# Patient Record
Sex: FEMALE | Race: BLACK OR AFRICAN AMERICAN | NOT HISPANIC OR LATINO
[De-identification: names, ages, dates, MRNs, and addresses within clinical notes are randomized per-mention and may not be internally consistent; named-entity substitution may affect disease eponyms.]

---

## 2017-11-21 PROBLEM — Z00.00 ENCOUNTER FOR PREVENTIVE HEALTH EXAMINATION: Status: ACTIVE | Noted: 2017-11-21

## 2017-12-07 ENCOUNTER — APPOINTMENT (OUTPATIENT)
Dept: PULMONOLOGY | Facility: CLINIC | Age: 67
End: 2017-12-07

## 2017-12-11 ENCOUNTER — APPOINTMENT (OUTPATIENT)
Dept: PULMONOLOGY | Facility: CLINIC | Age: 67
End: 2017-12-11

## 2019-04-01 ENCOUNTER — APPOINTMENT (OUTPATIENT)
Dept: PULMONOLOGY | Facility: CLINIC | Age: 69
End: 2019-04-01

## 2019-04-15 ENCOUNTER — APPOINTMENT (OUTPATIENT)
Dept: PULMONOLOGY | Facility: CLINIC | Age: 69
End: 2019-04-15
Payer: MEDICARE

## 2019-04-15 VITALS
TEMPERATURE: 98.3 F | HEART RATE: 94 BPM | RESPIRATION RATE: 12 BRPM | HEIGHT: 69 IN | OXYGEN SATURATION: 100 % | SYSTOLIC BLOOD PRESSURE: 106 MMHG | WEIGHT: 176 LBS | BODY MASS INDEX: 26.07 KG/M2 | DIASTOLIC BLOOD PRESSURE: 80 MMHG

## 2019-04-15 DIAGNOSIS — Z87.891 PERSONAL HISTORY OF NICOTINE DEPENDENCE: ICD-10-CM

## 2019-04-15 DIAGNOSIS — Z86.79 PERSONAL HISTORY OF OTHER DISEASES OF THE CIRCULATORY SYSTEM: ICD-10-CM

## 2019-04-15 DIAGNOSIS — J44.9 CHRONIC OBSTRUCTIVE PULMONARY DISEASE, UNSPECIFIED: ICD-10-CM

## 2019-04-15 PROCEDURE — 94060 EVALUATION OF WHEEZING: CPT

## 2019-04-15 PROCEDURE — 99204 OFFICE O/P NEW MOD 45 MIN: CPT | Mod: 25

## 2019-04-15 PROCEDURE — 94727 GAS DIL/WSHOT DETER LNG VOL: CPT

## 2019-04-15 PROCEDURE — 94729 DIFFUSING CAPACITY: CPT

## 2019-04-15 RX ORDER — ALBUTEROL SULFATE 90 UG/1
108 (90 BASE) INHALANT RESPIRATORY (INHALATION)
Refills: 0 | Status: ACTIVE | COMMUNITY

## 2019-04-15 RX ORDER — AMLODIPINE BESYLATE 10 MG/1
10 TABLET ORAL
Refills: 0 | Status: ACTIVE | COMMUNITY

## 2019-04-15 NOTE — PHYSICAL EXAM
[Normal Appearance] : normal appearance [Erythema] : no erythema of the pharynx [Neck Cervical Mass (___cm)] : no neck mass was observed [Auscultation Breath Sounds / Voice Sounds] : lungs were clear to auscultation bilaterally [Heart Sounds] : normal S1 and S2 [Abdomen Tenderness] : non-tender [] : no hepato-splenomegaly [Abnormal Walk] : normal gait [Nail Clubbing] : no clubbing of the fingernails [Skin Turgor] : normal skin turgor [No Focal Deficits] : no focal deficits [Oriented To Time, Place, And Person] : oriented to person, place, and time

## 2019-04-15 NOTE — PROCEDURE
[FreeTextEntry1] : Pulmonary function testing April 15, 2019. Mild obstructive airways disease was noted. Lung volumes were normal. Diffusion was mildly reduced. Significant improvement noted after administration of bronchodilator.

## 2019-04-15 NOTE — HISTORY OF PRESENT ILLNESS
[FreeTextEntry1] : 68-year-old former smoker advised to have pulmonary consultation for her breathing. She stopped smoking in 2018. She smoked 2-3 cigarettes per day for 50 years. Denied any coughing or wheezing. Becomes short of breath when walking up steps or 2-3 city blocks. No exertional chest pain, pressure or palpitations. Symptomatically she recovers with rest.

## 2019-04-15 NOTE — DISCUSSION/SUMMARY
[FreeTextEntry1] : She is a 68-year-old, former smoker, with PMH of hypertension and mild COPD.\par \par Her COPD is stable. She was advised to continue with albuterol on an as-needed basis.\par \par I would like to see her again in about 6 months. Sooner if necessary.

## 2019-04-15 NOTE — REVIEW OF SYSTEMS
[Fever] : no fever [Cough] : no cough [Dyspnea] : dyspnea [Wheezing] : no wheezing [PND] : no PND [Palpitations] : no palpitations [Nasal Discharge] : no nasal discharge [Heartburn] : no heartburn [Dysuria] : no dysuria [Myalgias] : no myalgias [Rash] : no [unfilled] rash [Anemia] : no anemia [Diabetes] : no diabetes mellitus [Headache] : no headache [Difficulty Initiating Sleep] : no difficulty falling asleep

## 2019-05-23 ENCOUNTER — APPOINTMENT (OUTPATIENT)
Dept: PULMONOLOGY | Facility: CLINIC | Age: 69
End: 2019-05-23

## 2019-08-05 ENCOUNTER — INPATIENT (INPATIENT)
Facility: HOSPITAL | Age: 69
LOS: 2 days | Discharge: ANOTHER HOSPITAL | End: 2019-08-08
Attending: HOSPITALIST | Admitting: HOSPITALIST
Payer: COMMERCIAL

## 2019-08-05 VITALS
DIASTOLIC BLOOD PRESSURE: 88 MMHG | SYSTOLIC BLOOD PRESSURE: 127 MMHG | HEIGHT: 71 IN | RESPIRATION RATE: 16 BRPM | TEMPERATURE: 98 F | WEIGHT: 138.89 LBS | OXYGEN SATURATION: 99 % | HEART RATE: 86 BPM

## 2019-08-05 DIAGNOSIS — K76.89 OTHER SPECIFIED DISEASES OF LIVER: ICD-10-CM

## 2019-08-05 DIAGNOSIS — I10 ESSENTIAL (PRIMARY) HYPERTENSION: ICD-10-CM

## 2019-08-05 LAB
ALBUMIN SERPL ELPH-MCNC: 2.7 G/DL — LOW (ref 3.3–5)
ALP SERPL-CCNC: 819 U/L — HIGH (ref 40–120)
ALT FLD-CCNC: 154 U/L — HIGH (ref 12–78)
AMMONIA BLD-MCNC: <10 UMOL/L — LOW (ref 11–32)
ANION GAP SERPL CALC-SCNC: 9 MMOL/L — SIGNIFICANT CHANGE UP (ref 5–17)
APTT BLD: 30.2 SEC — SIGNIFICANT CHANGE UP (ref 27.5–36.3)
AST SERPL-CCNC: 123 U/L — HIGH (ref 15–37)
BASOPHILS # BLD AUTO: 0.02 K/UL — SIGNIFICANT CHANGE UP (ref 0–0.2)
BASOPHILS NFR BLD AUTO: 0.4 % — SIGNIFICANT CHANGE UP (ref 0–2)
BILIRUB DIRECT SERPL-MCNC: 3.78 MG/DL — HIGH (ref 0.05–0.2)
BILIRUB INDIRECT FLD-MCNC: 0.8 MG/DL — SIGNIFICANT CHANGE UP (ref 0.2–1)
BILIRUB SERPL-MCNC: 4.6 MG/DL — HIGH (ref 0.2–1.2)
BILIRUB SERPL-MCNC: 4.6 MG/DL — HIGH (ref 0.2–1.2)
BUN SERPL-MCNC: 5 MG/DL — LOW (ref 7–23)
CALCIUM SERPL-MCNC: 8.7 MG/DL — SIGNIFICANT CHANGE UP (ref 8.5–10.1)
CHLORIDE SERPL-SCNC: 103 MMOL/L — SIGNIFICANT CHANGE UP (ref 96–108)
CO2 SERPL-SCNC: 27 MMOL/L — SIGNIFICANT CHANGE UP (ref 22–31)
CREAT SERPL-MCNC: 0.63 MG/DL — SIGNIFICANT CHANGE UP (ref 0.5–1.3)
EOSINOPHIL # BLD AUTO: 0.03 K/UL — SIGNIFICANT CHANGE UP (ref 0–0.5)
EOSINOPHIL NFR BLD AUTO: 0.6 % — SIGNIFICANT CHANGE UP (ref 0–6)
GLUCOSE SERPL-MCNC: 105 MG/DL — HIGH (ref 70–99)
HCT VFR BLD CALC: 34.5 % — SIGNIFICANT CHANGE UP (ref 34.5–45)
HGB BLD-MCNC: 11.3 G/DL — LOW (ref 11.5–15.5)
IMM GRANULOCYTES NFR BLD AUTO: 0.2 % — SIGNIFICANT CHANGE UP (ref 0–1.5)
INR BLD: 1.09 RATIO — SIGNIFICANT CHANGE UP (ref 0.88–1.16)
LYMPHOCYTES # BLD AUTO: 1.7 K/UL — SIGNIFICANT CHANGE UP (ref 1–3.3)
LYMPHOCYTES # BLD AUTO: 32.7 % — SIGNIFICANT CHANGE UP (ref 13–44)
MAGNESIUM SERPL-MCNC: 1.9 MG/DL — SIGNIFICANT CHANGE UP (ref 1.6–2.6)
MCHC RBC-ENTMCNC: 27.4 PG — SIGNIFICANT CHANGE UP (ref 27–34)
MCHC RBC-ENTMCNC: 32.8 GM/DL — SIGNIFICANT CHANGE UP (ref 32–36)
MCV RBC AUTO: 83.5 FL — SIGNIFICANT CHANGE UP (ref 80–100)
MONOCYTES # BLD AUTO: 0.37 K/UL — SIGNIFICANT CHANGE UP (ref 0–0.9)
MONOCYTES NFR BLD AUTO: 7.1 % — SIGNIFICANT CHANGE UP (ref 2–14)
NEUTROPHILS # BLD AUTO: 3.07 K/UL — SIGNIFICANT CHANGE UP (ref 1.8–7.4)
NEUTROPHILS NFR BLD AUTO: 59 % — SIGNIFICANT CHANGE UP (ref 43–77)
NRBC # BLD: 0 /100 WBCS — SIGNIFICANT CHANGE UP (ref 0–0)
PLATELET # BLD AUTO: 291 K/UL — SIGNIFICANT CHANGE UP (ref 150–400)
POTASSIUM SERPL-MCNC: 3.5 MMOL/L — SIGNIFICANT CHANGE UP (ref 3.5–5.3)
POTASSIUM SERPL-SCNC: 3.5 MMOL/L — SIGNIFICANT CHANGE UP (ref 3.5–5.3)
PROT SERPL-MCNC: 6.5 GM/DL — SIGNIFICANT CHANGE UP (ref 6–8.3)
PROTHROM AB SERPL-ACNC: 12.2 SEC — SIGNIFICANT CHANGE UP (ref 10–12.9)
RBC # BLD: 4.13 M/UL — SIGNIFICANT CHANGE UP (ref 3.8–5.2)
RBC # FLD: 16.6 % — HIGH (ref 10.3–14.5)
SODIUM SERPL-SCNC: 139 MMOL/L — SIGNIFICANT CHANGE UP (ref 135–145)
WBC # BLD: 5.2 K/UL — SIGNIFICANT CHANGE UP (ref 3.8–10.5)
WBC # FLD AUTO: 5.2 K/UL — SIGNIFICANT CHANGE UP (ref 3.8–10.5)

## 2019-08-05 PROCEDURE — 71045 X-RAY EXAM CHEST 1 VIEW: CPT | Mod: 26

## 2019-08-05 PROCEDURE — 99223 1ST HOSP IP/OBS HIGH 75: CPT

## 2019-08-05 PROCEDURE — 99285 EMERGENCY DEPT VISIT HI MDM: CPT

## 2019-08-05 PROCEDURE — 76700 US EXAM ABDOM COMPLETE: CPT | Mod: 26

## 2019-08-05 RX ORDER — AMLODIPINE BESYLATE 2.5 MG/1
10 TABLET ORAL DAILY
Refills: 0 | Status: DISCONTINUED | OUTPATIENT
Start: 2019-08-05 | End: 2019-08-08

## 2019-08-05 RX ORDER — ALBUTEROL 90 UG/1
1 AEROSOL, METERED ORAL
Refills: 0 | Status: DISCONTINUED | OUTPATIENT
Start: 2019-08-05 | End: 2019-08-08

## 2019-08-05 RX ORDER — CILOSTAZOL 100 MG/1
50 TABLET ORAL
Refills: 0 | Status: DISCONTINUED | OUTPATIENT
Start: 2019-08-05 | End: 2019-08-08

## 2019-08-05 RX ORDER — MORPHINE SULFATE 50 MG/1
4 CAPSULE, EXTENDED RELEASE ORAL EVERY 6 HOURS
Refills: 0 | Status: DISCONTINUED | OUTPATIENT
Start: 2019-08-05 | End: 2019-08-08

## 2019-08-05 RX ORDER — ONDANSETRON 8 MG/1
4 TABLET, FILM COATED ORAL ONCE
Refills: 0 | Status: COMPLETED | OUTPATIENT
Start: 2019-08-05 | End: 2019-08-05

## 2019-08-05 RX ADMIN — MORPHINE SULFATE 4 MILLIGRAM(S): 50 CAPSULE, EXTENDED RELEASE ORAL at 18:44

## 2019-08-05 RX ADMIN — MORPHINE SULFATE 4 MILLIGRAM(S): 50 CAPSULE, EXTENDED RELEASE ORAL at 23:17

## 2019-08-05 RX ADMIN — MORPHINE SULFATE 4 MILLIGRAM(S): 50 CAPSULE, EXTENDED RELEASE ORAL at 23:32

## 2019-08-05 RX ADMIN — ONDANSETRON 4 MILLIGRAM(S): 8 TABLET, FILM COATED ORAL at 18:44

## 2019-08-05 NOTE — PATIENT PROFILE ADULT - VISION (WITH CORRECTIVE LENSES IF THE PATIENT USUALLY WEARS THEM):
Rx glasses/Partially impaired: cannot see medication labels or newsprint, but can see obstacles in path, and the surrounding layout; can count fingers at arm's length

## 2019-08-05 NOTE — ED PROVIDER NOTE - CLINICAL SUMMARY MEDICAL DECISION MAKING FREE TEXT BOX
pt pw loose stools and diarrhea. at risk for malignancy pt pw loose stools and diarrhea. at risk for malignancy. sono shows obstructive choledocolithiasis. will admit. case cuauhtemoc conley

## 2019-08-05 NOTE — CONSULT NOTE ADULT - SUBJECTIVE AND OBJECTIVE BOX
full consult dictated    Plan: Pt with elevated LFT's; dilated CBD; RUQ discomfort.  Will order MRCP for further evaluation. Check Hep C. Hold off on antibx for now.  Pending results of MRCP, might need ERCP.

## 2019-08-05 NOTE — H&P ADULT - NSHPPHYSICALEXAM_GEN_ALL_CORE
ICU Vital Signs Last 24 Hrs  T(C): 36.7 (05 Aug 2019 15:41), Max: 36.7 (05 Aug 2019 15:41)  T(F): 98 (05 Aug 2019 15:41), Max: 98 (05 Aug 2019 15:41)  HR: 86 (05 Aug 2019 15:41) (86 - 86)  BP: 127/88 (05 Aug 2019 15:41) (127/88 - 127/88)  BP(mean): --  ABP: --  ABP(mean): --  RR: 16 (05 Aug 2019 15:41) (16 - 16)  SpO2: 99% (05 Aug 2019 15:41) (99% - 99%)  GENERAL: NAD well-developed  HEAD:  Atraumatic, Normocephalic  EYES: EOMI, PERRLA, conjunctiva and sclera clear  ENMT: No tonsillar erythema, exudates, or enlargement; Moist mucous membranes, Good dentition, No lesions  NECK: Supple, No JVD, Normal thyroid  NERVOUS SYSTEM:  Alert & Oriented X3, Good concentration; Motor Strength 5/5 B/L upper and lower extremities; DTRs 2+ intact and symmetric  CHEST/LUNG: Clear to percussion bilaterally; No rales, rhonchi, wheezing, or rubs  HEART: Regular rate and rhythm; No murmurs, rubs, or gallops  ABDOMEN: Soft, Nontender, Nondistended; Bowel sounds present  EXTREMITIES:  2+ Peripheral Pulses, No clubbing, cyanosis, or edema  LYMPH: No lymphadenopathy   SKIN: No rashes or lesions ICU Vital Signs Last 24 Hrs  T(C): 36.7 (05 Aug 2019 15:41), Max: 36.7 (05 Aug 2019 15:41)  T(F): 98 (05 Aug 2019 15:41), Max: 98 (05 Aug 2019 15:41)  HR: 86 (05 Aug 2019 15:41) (86 - 86)  BP: 127/88 (05 Aug 2019 15:41) (127/88 - 127/88)  BP(mean): --  ABP: --  ABP(mean): --  RR: 16 (05 Aug 2019 15:41) (16 - 16)  SpO2: 99% (05 Aug 2019 15:41) (99% - 99%)  GENERAL: NAD well-developed  HEAD:  Atraumatic, Normocephalic  EYES: EOMI, PERRLA, conjunctiva and sclera clear  ENMT: No tonsillar erythema, exudates, or enlargement; Moist mucous membranes, Good dentition, No lesions  NECK: Supple, No JVD, Normal thyroid  NERVOUS SYSTEM:  Alert & Oriented X3, Good concentration; Motor Strength 5/5 B/L upper and lower extremities; DTRs 2+ intact and symmetric  CHEST/LUNG: Clear to percussion bilaterally; No rales, rhonchi, wheezing, or rubs  HEART: Regular rate and rhythm; No murmurs, rubs, or gallops  ABDOMEN: Soft, mild ruq tender, Nondistended; Bowel sounds present  EXTREMITIES:  2+ Peripheral Pulses, No clubbing, cyanosis, or edema  LYMPH: No lymphadenopathy   SKIN: No rashes or lesions

## 2019-08-05 NOTE — H&P ADULT - ASSESSMENT
68f with 2 weeks of diarrhea and now with pain to the ruq with multiple stones rto the ruq       IMPROVE VTE Individual Risk Assessment    RISK                                                          Points  [] Previous VTE                                           3  [] Thrombophilia                                        2  [] Lower limb paralysis                              2   [] Current Cancer                                       2   [] Immobilization > 24 hrs                        1  [] ICU/CCU stay > 24 hours                       1  [] Age > 60                                                   1    IMPROVE VTE Score:

## 2019-08-05 NOTE — ED PROVIDER NOTE - OBJECTIVE STATEMENT
Pertinent PMH/PSH/FHx/SHx and Review of Systems contained within:  68F former smoker, htn, pw 2 months of diarrhea and abnl lfts per pmd. patient notes the stool has been lightening. she goes 5 times daily, always loose. she denies abd pain, fever, chills, numbness, rash, bleeding, dysuria, ha, vision loss, rhinorrhea. nothing was given for symptoms  Fh and Sh not otherwise contributory  ROS otherwise negative

## 2019-08-05 NOTE — H&P ADULT - HISTORY OF PRESENT ILLNESS
68F former smoker, htn, pw 2 months of diarrhea and abnl lfts per pmd. patient notes the stool has been lightening. she goes 5 times daily, always loose. she denies abd pain, fever, chills, numbness, rash, bleeding, dysuria, ha, vision loss, rhinorrhea. nothing was given for symptoms 68F former smoker, htn, pw 2 months of diarrhea and abnl lfts per pmd. patient notes the stool has been lightening. she goes 5 times daily, always loose. she has ruq  abd pain, fever, chills, numbness, rash, bleeding, dysuria, ha, vision loss, rhinorrhea. nothing was given for symptoms

## 2019-08-06 DIAGNOSIS — N39.0 URINARY TRACT INFECTION, SITE NOT SPECIFIED: ICD-10-CM

## 2019-08-06 LAB
ALBUMIN SERPL ELPH-MCNC: 2.4 G/DL — LOW (ref 3.3–5)
ALP SERPL-CCNC: 754 U/L — HIGH (ref 40–120)
ALT FLD-CCNC: 134 U/L — HIGH (ref 12–78)
APPEARANCE UR: ABNORMAL
AST SERPL-CCNC: 104 U/L — HIGH (ref 15–37)
BACTERIA # UR AUTO: ABNORMAL
BILIRUB DIRECT SERPL-MCNC: 3.64 MG/DL — HIGH (ref 0.05–0.2)
BILIRUB INDIRECT FLD-MCNC: 0.9 MG/DL — SIGNIFICANT CHANGE UP (ref 0.2–1)
BILIRUB SERPL-MCNC: 4.5 MG/DL — HIGH (ref 0.2–1.2)
BILIRUB UR-MCNC: ABNORMAL
CANCER AG19-9 SERPL-ACNC: 53 U/ML — HIGH
COLOR SPEC: ABNORMAL
DIFF PNL FLD: ABNORMAL
EPI CELLS # UR: ABNORMAL
GLUCOSE UR QL: NEGATIVE MG/DL — SIGNIFICANT CHANGE UP
HCT VFR BLD CALC: 30 % — LOW (ref 34.5–45)
HCV AB S/CO SERPL IA: 0.06 S/CO — SIGNIFICANT CHANGE UP (ref 0–0.99)
HCV AB SERPL-IMP: SIGNIFICANT CHANGE UP
HGB BLD-MCNC: 9.7 G/DL — LOW (ref 11.5–15.5)
KETONES UR-MCNC: ABNORMAL
LEUKOCYTE ESTERASE UR-ACNC: ABNORMAL
MCHC RBC-ENTMCNC: 27.2 PG — SIGNIFICANT CHANGE UP (ref 27–34)
MCHC RBC-ENTMCNC: 32.3 GM/DL — SIGNIFICANT CHANGE UP (ref 32–36)
MCV RBC AUTO: 84.3 FL — SIGNIFICANT CHANGE UP (ref 80–100)
NITRITE UR-MCNC: POSITIVE
NRBC # BLD: 0 /100 WBCS — SIGNIFICANT CHANGE UP (ref 0–0)
PH UR: 6.5 — SIGNIFICANT CHANGE UP (ref 5–8)
PLATELET # BLD AUTO: 275 K/UL — SIGNIFICANT CHANGE UP (ref 150–400)
PROT SERPL-MCNC: 6.1 GM/DL — SIGNIFICANT CHANGE UP (ref 6–8.3)
PROT UR-MCNC: 100 MG/DL
RBC # BLD: 3.56 M/UL — LOW (ref 3.8–5.2)
RBC # FLD: 17.1 % — HIGH (ref 10.3–14.5)
RBC CASTS # UR COMP ASSIST: ABNORMAL /HPF (ref 0–4)
SP GR SPEC: 1.02 — SIGNIFICANT CHANGE UP (ref 1.01–1.02)
UROBILINOGEN FLD QL: 8 MG/DL
WBC # BLD: 5.4 K/UL — SIGNIFICANT CHANGE UP (ref 3.8–10.5)
WBC # FLD AUTO: 5.4 K/UL — SIGNIFICANT CHANGE UP (ref 3.8–10.5)
WBC UR QL: >50

## 2019-08-06 PROCEDURE — 93010 ELECTROCARDIOGRAM REPORT: CPT

## 2019-08-06 PROCEDURE — 74181 MRI ABDOMEN W/O CONTRAST: CPT | Mod: 26

## 2019-08-06 PROCEDURE — 99233 SBSQ HOSP IP/OBS HIGH 50: CPT

## 2019-08-06 RX ORDER — CEFTRIAXONE 500 MG/1
1000 INJECTION, POWDER, FOR SOLUTION INTRAMUSCULAR; INTRAVENOUS EVERY 24 HOURS
Refills: 0 | Status: DISCONTINUED | OUTPATIENT
Start: 2019-08-06 | End: 2019-08-08

## 2019-08-06 RX ORDER — METOCLOPRAMIDE HCL 10 MG
5 TABLET ORAL THREE TIMES A DAY
Refills: 0 | Status: COMPLETED | OUTPATIENT
Start: 2019-08-06 | End: 2019-08-08

## 2019-08-06 RX ORDER — ONDANSETRON 8 MG/1
4 TABLET, FILM COATED ORAL EVERY 6 HOURS
Refills: 0 | Status: DISCONTINUED | OUTPATIENT
Start: 2019-08-06 | End: 2019-08-08

## 2019-08-06 RX ORDER — SODIUM CHLORIDE 9 MG/ML
1000 INJECTION INTRAMUSCULAR; INTRAVENOUS; SUBCUTANEOUS
Refills: 0 | Status: COMPLETED | OUTPATIENT
Start: 2019-08-06 | End: 2019-08-07

## 2019-08-06 RX ADMIN — Medication 5 MILLIGRAM(S): at 13:57

## 2019-08-06 RX ADMIN — AMLODIPINE BESYLATE 10 MILLIGRAM(S): 2.5 TABLET ORAL at 05:16

## 2019-08-06 RX ADMIN — ALBUTEROL 1 PUFF(S): 90 AEROSOL, METERED ORAL at 03:46

## 2019-08-06 RX ADMIN — CILOSTAZOL 50 MILLIGRAM(S): 100 TABLET ORAL at 05:16

## 2019-08-06 RX ADMIN — ONDANSETRON 4 MILLIGRAM(S): 8 TABLET, FILM COATED ORAL at 05:15

## 2019-08-06 RX ADMIN — SODIUM CHLORIDE 75 MILLILITER(S): 9 INJECTION INTRAMUSCULAR; INTRAVENOUS; SUBCUTANEOUS at 12:57

## 2019-08-06 RX ADMIN — MORPHINE SULFATE 4 MILLIGRAM(S): 50 CAPSULE, EXTENDED RELEASE ORAL at 12:44

## 2019-08-06 RX ADMIN — Medication 5 MILLIGRAM(S): at 21:27

## 2019-08-06 RX ADMIN — CEFTRIAXONE 100 MILLIGRAM(S): 500 INJECTION, POWDER, FOR SOLUTION INTRAMUSCULAR; INTRAVENOUS at 19:20

## 2019-08-06 RX ADMIN — CILOSTAZOL 50 MILLIGRAM(S): 100 TABLET ORAL at 19:23

## 2019-08-07 DIAGNOSIS — Z29.9 ENCOUNTER FOR PROPHYLACTIC MEASURES, UNSPECIFIED: ICD-10-CM

## 2019-08-07 DIAGNOSIS — E87.6 HYPOKALEMIA: ICD-10-CM

## 2019-08-07 LAB
ALBUMIN SERPL ELPH-MCNC: 2.4 G/DL — LOW (ref 3.3–5)
ALP SERPL-CCNC: 725 U/L — HIGH (ref 40–120)
ALT FLD-CCNC: 123 U/L — HIGH (ref 12–78)
ANION GAP SERPL CALC-SCNC: 13 MMOL/L — SIGNIFICANT CHANGE UP (ref 5–17)
AST SERPL-CCNC: 95 U/L — HIGH (ref 15–37)
BILIRUB DIRECT SERPL-MCNC: 4.32 MG/DL — HIGH (ref 0.05–0.2)
BILIRUB INDIRECT FLD-MCNC: 1 MG/DL — SIGNIFICANT CHANGE UP (ref 0.2–1)
BILIRUB SERPL-MCNC: 5.3 MG/DL — HIGH (ref 0.2–1.2)
BUN SERPL-MCNC: 6 MG/DL — LOW (ref 7–23)
CALCIUM SERPL-MCNC: 8.1 MG/DL — LOW (ref 8.5–10.1)
CHLORIDE SERPL-SCNC: 106 MMOL/L — SIGNIFICANT CHANGE UP (ref 96–108)
CO2 SERPL-SCNC: 23 MMOL/L — SIGNIFICANT CHANGE UP (ref 22–31)
CREAT SERPL-MCNC: 0.58 MG/DL — SIGNIFICANT CHANGE UP (ref 0.5–1.3)
GLUCOSE SERPL-MCNC: 70 MG/DL — SIGNIFICANT CHANGE UP (ref 70–99)
HAV IGM SER-ACNC: SIGNIFICANT CHANGE UP
HBV CORE IGM SER-ACNC: SIGNIFICANT CHANGE UP
HBV SURFACE AG SER-ACNC: SIGNIFICANT CHANGE UP
HCT VFR BLD CALC: 29.9 % — LOW (ref 34.5–45)
HCV AB S/CO SERPL IA: 0.06 S/CO — SIGNIFICANT CHANGE UP (ref 0–0.99)
HCV AB SERPL-IMP: SIGNIFICANT CHANGE UP
HGB BLD-MCNC: 9.7 G/DL — LOW (ref 11.5–15.5)
MCHC RBC-ENTMCNC: 27.2 PG — SIGNIFICANT CHANGE UP (ref 27–34)
MCHC RBC-ENTMCNC: 32.4 GM/DL — SIGNIFICANT CHANGE UP (ref 32–36)
MCV RBC AUTO: 83.8 FL — SIGNIFICANT CHANGE UP (ref 80–100)
NRBC # BLD: 0 /100 WBCS — SIGNIFICANT CHANGE UP (ref 0–0)
PLATELET # BLD AUTO: 273 K/UL — SIGNIFICANT CHANGE UP (ref 150–400)
POTASSIUM SERPL-MCNC: 2.9 MMOL/L — CRITICAL LOW (ref 3.5–5.3)
POTASSIUM SERPL-SCNC: 2.9 MMOL/L — CRITICAL LOW (ref 3.5–5.3)
PROT SERPL-MCNC: 6 GM/DL — SIGNIFICANT CHANGE UP (ref 6–8.3)
RBC # BLD: 3.57 M/UL — LOW (ref 3.8–5.2)
RBC # FLD: 17 % — HIGH (ref 10.3–14.5)
SODIUM SERPL-SCNC: 142 MMOL/L — SIGNIFICANT CHANGE UP (ref 135–145)
WBC # BLD: 6.12 K/UL — SIGNIFICANT CHANGE UP (ref 3.8–10.5)
WBC # FLD AUTO: 6.12 K/UL — SIGNIFICANT CHANGE UP (ref 3.8–10.5)

## 2019-08-07 PROCEDURE — 74177 CT ABD & PELVIS W/CONTRAST: CPT | Mod: 26

## 2019-08-07 PROCEDURE — 99233 SBSQ HOSP IP/OBS HIGH 50: CPT

## 2019-08-07 RX ORDER — POTASSIUM CHLORIDE 20 MEQ
10 PACKET (EA) ORAL
Refills: 0 | Status: COMPLETED | OUTPATIENT
Start: 2019-08-07 | End: 2019-08-07

## 2019-08-07 RX ORDER — ENOXAPARIN SODIUM 100 MG/ML
40 INJECTION SUBCUTANEOUS DAILY
Refills: 0 | Status: DISCONTINUED | OUTPATIENT
Start: 2019-08-07 | End: 2019-08-08

## 2019-08-07 RX ORDER — POTASSIUM CHLORIDE 20 MEQ
40 PACKET (EA) ORAL EVERY 4 HOURS
Refills: 0 | Status: COMPLETED | OUTPATIENT
Start: 2019-08-07 | End: 2019-08-07

## 2019-08-07 RX ADMIN — Medication 40 MILLIEQUIVALENT(S): at 22:28

## 2019-08-07 RX ADMIN — Medication 40 MILLIEQUIVALENT(S): at 17:03

## 2019-08-07 RX ADMIN — CILOSTAZOL 50 MILLIGRAM(S): 100 TABLET ORAL at 17:09

## 2019-08-07 RX ADMIN — CILOSTAZOL 50 MILLIGRAM(S): 100 TABLET ORAL at 05:48

## 2019-08-07 RX ADMIN — Medication 100 MILLIEQUIVALENT(S): at 18:12

## 2019-08-07 RX ADMIN — CEFTRIAXONE 100 MILLIGRAM(S): 500 INJECTION, POWDER, FOR SOLUTION INTRAMUSCULAR; INTRAVENOUS at 21:24

## 2019-08-07 RX ADMIN — AMLODIPINE BESYLATE 10 MILLIGRAM(S): 2.5 TABLET ORAL at 05:48

## 2019-08-07 RX ADMIN — Medication 5 MILLIGRAM(S): at 05:48

## 2019-08-07 RX ADMIN — SODIUM CHLORIDE 75 MILLILITER(S): 9 INJECTION INTRAMUSCULAR; INTRAVENOUS; SUBCUTANEOUS at 05:48

## 2019-08-07 RX ADMIN — Medication 100 MILLIEQUIVALENT(S): at 17:04

## 2019-08-08 ENCOUNTER — INPATIENT (INPATIENT)
Facility: HOSPITAL | Age: 69
LOS: 14 days | Discharge: HOME | End: 2019-08-23
Attending: HOSPITALIST | Admitting: HOSPITALIST
Payer: MEDICARE

## 2019-08-08 VITALS
HEART RATE: 67 BPM | OXYGEN SATURATION: 97 % | RESPIRATION RATE: 17 BRPM | DIASTOLIC BLOOD PRESSURE: 53 MMHG | TEMPERATURE: 99 F | SYSTOLIC BLOOD PRESSURE: 122 MMHG

## 2019-08-08 VITALS
RESPIRATION RATE: 18 BRPM | WEIGHT: 146.39 LBS | HEART RATE: 83 BPM | DIASTOLIC BLOOD PRESSURE: 76 MMHG | SYSTOLIC BLOOD PRESSURE: 121 MMHG | HEIGHT: 71 IN | TEMPERATURE: 98 F | OXYGEN SATURATION: 97 %

## 2019-08-08 DIAGNOSIS — Z98.890 OTHER SPECIFIED POSTPROCEDURAL STATES: Chronic | ICD-10-CM

## 2019-08-08 DIAGNOSIS — R22.9 LOCALIZED SWELLING, MASS AND LUMP, UNSPECIFIED: ICD-10-CM

## 2019-08-08 DIAGNOSIS — D50.9 IRON DEFICIENCY ANEMIA, UNSPECIFIED: ICD-10-CM

## 2019-08-08 DIAGNOSIS — K86.9 DISEASE OF PANCREAS, UNSPECIFIED: ICD-10-CM

## 2019-08-08 DIAGNOSIS — K76.89 OTHER SPECIFIED DISEASES OF LIVER: ICD-10-CM

## 2019-08-08 DIAGNOSIS — N39.0 URINARY TRACT INFECTION, SITE NOT SPECIFIED: ICD-10-CM

## 2019-08-08 DIAGNOSIS — I73.9 PERIPHERAL VASCULAR DISEASE, UNSPECIFIED: ICD-10-CM

## 2019-08-08 DIAGNOSIS — Z95.828 PRESENCE OF OTHER VASCULAR IMPLANTS AND GRAFTS: Chronic | ICD-10-CM

## 2019-08-08 DIAGNOSIS — E46 UNSPECIFIED PROTEIN-CALORIE MALNUTRITION: ICD-10-CM

## 2019-08-08 DIAGNOSIS — Z98.891 HISTORY OF UTERINE SCAR FROM PREVIOUS SURGERY: Chronic | ICD-10-CM

## 2019-08-08 DIAGNOSIS — Z29.9 ENCOUNTER FOR PROPHYLACTIC MEASURES, UNSPECIFIED: ICD-10-CM

## 2019-08-08 DIAGNOSIS — E83.42 HYPOMAGNESEMIA: ICD-10-CM

## 2019-08-08 LAB
ALBUMIN SERPL ELPH-MCNC: 2.4 G/DL — LOW (ref 3.3–5)
ALP SERPL-CCNC: 766 U/L — HIGH (ref 40–120)
ALT FLD-CCNC: 122 U/L — HIGH (ref 12–78)
ANION GAP SERPL CALC-SCNC: 9 MMOL/L — SIGNIFICANT CHANGE UP (ref 5–17)
AST SERPL-CCNC: 107 U/L — HIGH (ref 15–37)
BILIRUB DIRECT SERPL-MCNC: 4.47 MG/DL — HIGH (ref 0.05–0.2)
BILIRUB INDIRECT FLD-MCNC: 1.3 MG/DL — HIGH (ref 0.2–1)
BILIRUB SERPL-MCNC: 5.8 MG/DL — HIGH (ref 0.2–1.2)
BUN SERPL-MCNC: 3 MG/DL — LOW (ref 7–23)
CALCIUM SERPL-MCNC: 8.4 MG/DL — LOW (ref 8.5–10.1)
CHLORIDE SERPL-SCNC: 104 MMOL/L — SIGNIFICANT CHANGE UP (ref 96–108)
CO2 SERPL-SCNC: 26 MMOL/L — SIGNIFICANT CHANGE UP (ref 22–31)
CREAT SERPL-MCNC: 0.59 MG/DL — SIGNIFICANT CHANGE UP (ref 0.5–1.3)
GLUCOSE SERPL-MCNC: 86 MG/DL — SIGNIFICANT CHANGE UP (ref 70–99)
HCT VFR BLD CALC: 31.7 % — LOW (ref 34.5–45)
HGB BLD-MCNC: 10.3 G/DL — LOW (ref 11.5–15.5)
MAGNESIUM SERPL-MCNC: 1.5 MG/DL — LOW (ref 1.6–2.6)
MCHC RBC-ENTMCNC: 26.8 PG — LOW (ref 27–34)
MCHC RBC-ENTMCNC: 32.5 GM/DL — SIGNIFICANT CHANGE UP (ref 32–36)
MCV RBC AUTO: 82.3 FL — SIGNIFICANT CHANGE UP (ref 80–100)
NRBC # BLD: 0 /100 WBCS — SIGNIFICANT CHANGE UP (ref 0–0)
PHOSPHATE SERPL-MCNC: 2.6 MG/DL — SIGNIFICANT CHANGE UP (ref 2.5–4.5)
PLATELET # BLD AUTO: 294 K/UL — SIGNIFICANT CHANGE UP (ref 150–400)
POTASSIUM SERPL-MCNC: 4.1 MMOL/L — SIGNIFICANT CHANGE UP (ref 3.5–5.3)
POTASSIUM SERPL-SCNC: 4.1 MMOL/L — SIGNIFICANT CHANGE UP (ref 3.5–5.3)
PROT SERPL-MCNC: 6.3 GM/DL — SIGNIFICANT CHANGE UP (ref 6–8.3)
RBC # BLD: 3.85 M/UL — SIGNIFICANT CHANGE UP (ref 3.8–5.2)
RBC # FLD: 17 % — HIGH (ref 10.3–14.5)
SODIUM SERPL-SCNC: 139 MMOL/L — SIGNIFICANT CHANGE UP (ref 135–145)
WBC # BLD: 5.16 K/UL — SIGNIFICANT CHANGE UP (ref 3.8–10.5)
WBC # FLD AUTO: 5.16 K/UL — SIGNIFICANT CHANGE UP (ref 3.8–10.5)

## 2019-08-08 PROCEDURE — 99223 1ST HOSP IP/OBS HIGH 75: CPT

## 2019-08-08 PROCEDURE — 99239 HOSP IP/OBS DSCHRG MGMT >30: CPT

## 2019-08-08 RX ORDER — AMLODIPINE BESYLATE 2.5 MG/1
1 TABLET ORAL
Qty: 0 | Refills: 0 | DISCHARGE

## 2019-08-08 RX ORDER — LOPERAMIDE HCL 2 MG
1 TABLET ORAL
Qty: 0 | Refills: 0 | DISCHARGE

## 2019-08-08 RX ORDER — SODIUM CHLORIDE 9 MG/ML
1000 INJECTION INTRAMUSCULAR; INTRAVENOUS; SUBCUTANEOUS
Refills: 0 | Status: DISCONTINUED | OUTPATIENT
Start: 2019-08-08 | End: 2019-08-11

## 2019-08-08 RX ORDER — MAGNESIUM SULFATE 500 MG/ML
2 VIAL (ML) INJECTION ONCE
Refills: 0 | Status: COMPLETED | OUTPATIENT
Start: 2019-08-08 | End: 2019-08-08

## 2019-08-08 RX ORDER — ALBUTEROL 90 UG/1
2 AEROSOL, METERED ORAL EVERY 6 HOURS
Refills: 0 | Status: DISCONTINUED | OUTPATIENT
Start: 2019-08-08 | End: 2019-08-23

## 2019-08-08 RX ORDER — HEPARIN SODIUM 5000 [USP'U]/ML
5000 INJECTION INTRAVENOUS; SUBCUTANEOUS EVERY 12 HOURS
Refills: 0 | Status: DISCONTINUED | OUTPATIENT
Start: 2019-08-08 | End: 2019-08-11

## 2019-08-08 RX ORDER — MORPHINE SULFATE 50 MG/1
4 CAPSULE, EXTENDED RELEASE ORAL EVERY 6 HOURS
Refills: 0 | Status: DISCONTINUED | OUTPATIENT
Start: 2019-08-08 | End: 2019-08-12

## 2019-08-08 RX ORDER — ALBUTEROL 90 UG/1
2 AEROSOL, METERED ORAL
Qty: 0 | Refills: 0 | DISCHARGE

## 2019-08-08 RX ORDER — CILOSTAZOL 100 MG/1
50 TABLET ORAL
Refills: 0 | Status: DISCONTINUED | OUTPATIENT
Start: 2019-08-08 | End: 2019-08-11

## 2019-08-08 RX ORDER — CEFTRIAXONE 500 MG/1
1000 INJECTION, POWDER, FOR SOLUTION INTRAMUSCULAR; INTRAVENOUS EVERY 24 HOURS
Refills: 0 | Status: COMPLETED | OUTPATIENT
Start: 2019-08-08 | End: 2019-08-10

## 2019-08-08 RX ORDER — ONDANSETRON 8 MG/1
4 TABLET, FILM COATED ORAL EVERY 6 HOURS
Refills: 0 | Status: DISCONTINUED | OUTPATIENT
Start: 2019-08-08 | End: 2019-08-23

## 2019-08-08 RX ORDER — CILOSTAZOL 100 MG/1
1 TABLET ORAL
Qty: 0 | Refills: 0 | DISCHARGE

## 2019-08-08 RX ORDER — AMLODIPINE BESYLATE 2.5 MG/1
10 TABLET ORAL DAILY
Refills: 0 | Status: DISCONTINUED | OUTPATIENT
Start: 2019-08-08 | End: 2019-08-23

## 2019-08-08 RX ADMIN — Medication 50 GRAM(S): at 17:17

## 2019-08-08 RX ADMIN — CILOSTAZOL 50 MILLIGRAM(S): 100 TABLET ORAL at 05:49

## 2019-08-08 RX ADMIN — AMLODIPINE BESYLATE 10 MILLIGRAM(S): 2.5 TABLET ORAL at 05:49

## 2019-08-08 RX ADMIN — CILOSTAZOL 50 MILLIGRAM(S): 100 TABLET ORAL at 17:17

## 2019-08-08 RX ADMIN — ENOXAPARIN SODIUM 40 MILLIGRAM(S): 100 INJECTION SUBCUTANEOUS at 13:02

## 2019-08-08 NOTE — H&P ADULT - HISTORY OF PRESENT ILLNESS
68 year old female, with past history significant for HTN, Smoking        Vital signs upon ED presentation as follows: BP = 68 year old female, with past history significant for HTN, Anemia, Smoking, PVD, Claudication of R-LE, and RLE stent placement, presented to the floor, as direct transfer from [facility] secondary to need for "EUS and possible biopsy of pancreatic mass."  Seen and evaluated at bedside; NAD.  Patient relates being contacted by her PMD and advised to present to the hospital ED for evaluation after findings of abnormal liver function tests.  Prior to then, patient notes persistent mid abdominal pain, without radiation, for the past ~ 2 to 3 weeks, and associated with nausea, but no vomiting.  Indicates weight loss of 47 pounds from March 2019 to June 2019.  This was in the setting of decreased oral intake; patient would have appetite for various foods, but would feel full on attempted eating.  Also relates persistent diarrhea, for which she was prescribed loperamide PRN.  Reports chronic intermittent chills, which was presumed to be due to previously diagnosed anemia.  No reports of fevers, sweating, headaches, dizziness, chest pain, shortness of breath, vomiting.    Vital signs upon arrival on the floor as follows: BP =  121/76, HR = 83, RR = 18, T = 36.9 C (98.4 F), O2 Sat = 97% on RA.  Diagnosed with Pancreatic mass, Obstructive cholestatic liver disease, Urinary tract infection without Hematuria, Hypokalemia, PVD, Essential Hypertension.

## 2019-08-08 NOTE — ACUTE INTERFACILITY TRANSFER NOTE - HOSPITAL COURSE
68 YOF with 2 weeks of diarrhea p/w ruq pain. Found to have elevated LFTS. CT a/p with pancreatic mass. Now transfer to [facility] to Dr. Pina for EUS and possible biopsy of pancreatic mass.     Problem/Plan - 2:  ·  Problem: Obstructive cholestatic liver disease.  Plan: - elevated LFTs  - US abdominal: CBD dilated at 1.2 cm.  - LFTs in AM  - Acute hepatitis panel.  -  mass found   - IVF hydration  - GI eval noted  monitor lfts.      Problem/Plan - 3:  ·  Problem: Essential hypertension.  Plan: continue home meds.      Problem/Plan - 4:  ·  Problem: Urinary tract infection without hematuria, site unspecified.  Plan: - Rocephin empirically  - no currrent urine culture and ABX already started.      Problem/Plan - 5:  ·  Problem: Hypokalemia.  Plan: resolved continue to follow mag and phosph and potassium.      Problem/Plan - 6:  Problem: Peripheral vascular disease. Plan: currently on cilostazol     Problem/Plan - 7:  ·  Problem: Preventive measure.  Plan: Lovenox daily

## 2019-08-08 NOTE — H&P ADULT - NSICDXPASTSURGICALHX_GEN_ALL_CORE_FT
PAST SURGICAL HISTORY:  History of  section     History of colonoscopy ~     History of intravascular stent placement ~ RLE ([facility] ~ 2018)

## 2019-08-08 NOTE — H&P ADULT - REASON FOR ADMISSION
Pancreatic mass, Urinary tract infection Pancreatic mass, Obstructive cholestatic liver disease, Urinary tract infection without Hematuria, Hypokalemia, PVD, Essential Hypertension.

## 2019-08-08 NOTE — H&P ADULT - PROBLEM SELECTOR PLAN 9
- was on Lovenox 40 mg prior to transfer  - changed to heparin 5000 Q12H for now, pending EUS procedure  - ambulate with cane, as tolerated

## 2019-08-08 NOTE — H&P ADULT - PROBLEM SELECTOR PLAN 3
- no symptomology presently  - started on Ceftriaxone at transfer facility; will continue  - ensure optimal hydration  - sending for urine culture

## 2019-08-08 NOTE — H&P ADULT - PROBLEM SELECTOR PLAN 1
- "hypoenhancing 3 / 3.3 cm solid mass in the pancreatic head...inseparable from the adjacent duodenum..." - diagnosed on CT scan of abd/pelvis, after finding of elevated LFTs and history of persistent abdominal pain, loss of appetite, weight loss, persistent loose stools  - transferred for EUS and possible biopsy of the mass w/ Dr Chavez  - NPO after midnight; IVF hydration of NS @ 75 mL/Hr while NPO  - f/u repeat LFTs in the AM

## 2019-08-08 NOTE — H&P ADULT - NSHPOUTPATIENTPROVIDERS_GEN_ALL_CORE
Zackery Robles MD (PMD - [phone] [place])  Prashant Gould MD (Vascular Surgeon - [facility])

## 2019-08-08 NOTE — PROGRESS NOTE ADULT - PROBLEM SELECTOR PROBLEM 3
Urinary tract infection without hematuria, site unspecified
Urinary tract infection without hematuria, site unspecified
Essential hypertension

## 2019-08-08 NOTE — PROGRESS NOTE ADULT - PROBLEM SELECTOR PLAN 2
continue home meds
continue home meds
- elevated LFTs  - US abdominal: CBD dilated at 1.2 cm.  - LFTs in AM  - Acute hepatitis panel.  -  mass found s   - IVF hydration  - GI eval noted  monitor lfts

## 2019-08-08 NOTE — H&P ADULT - PROBLEM SELECTOR PLAN 6
- with report of RLE stent placement, and persistent, intermittent claudication chiefly of the RLE  - CT scan w/ finding of "Diffuse vasculopathy with occluded right common iliac and external iliac arteries. The patency of the femorofemoral graft cannot be determined on this exam.   - continues on cilostazol 50 mg Q12H as PTA

## 2019-08-08 NOTE — H&P ADULT - NSICDXFAMILYHX_GEN_ALL_CORE_FT
FAMILY HISTORY:  Family history of cancer, ~ mother (unknown type)  Family history of hypertension, ~ mother

## 2019-08-08 NOTE — H&P ADULT - NSHPLABSRESULTS_GEN_ALL_CORE
10.3   5.16  )-----------( 294      ( 08 Aug 2019 07:31 )             31.7       08-08    139  |  104  |  3<L>  ----------------------------<  86  4.1   |  26  |  0.59    Ca    8.4<L>      08 Aug 2019 07:31  Phos  2.6     08-08  Mg     1.5     08-08    TPro  6.3  /  Alb  2.4<L>  /  TBili  5.8<H>  /  DBili  4.47<H>  /  AST  107<H>  /  ALT  122<H>  /  AlkPhos  766<H>  08-08      Lactate Trend      CAPILLARY BLOOD GLUCOSE 10.3   5.16  )-----------( 294      ( 08 Aug 2019 07:31 )             31.7       08-08    139  |  104  |  3<L>  ----------------------------<  86  4.1   |  26  |  0.59    Ca    8.4<L>      08 Aug 2019 07:31  Phos  2.6     08-08  Mg     1.5     08-08    TPro  6.3  /  Alb  2.4<L>  /  TBili  5.8<H>  /  DBili  4.47<H>  /  AST  107<H>  /  ALT  122<H>  /  AlkPhos  766<H>  08-08      Lactate Trend      CAPILLARY BLOOD GLUCOSE    =========================================================    ECG = Sinus bradycardia at 49 bpm, QTc = 471

## 2019-08-08 NOTE — H&P ADULT - PROBLEM SELECTOR PLAN 4
- level = 1.5; likely due to nutritional deficiency, but also in the setting of pancreatic disease  - already supplemented w/ 2 grams magnesium sulfate  - likely contributing toward previous hypokalemic state  - f/u repeat lab-work, including vitamin D level, in the AM

## 2019-08-08 NOTE — H&P ADULT - PROBLEM SELECTOR PLAN 8
- was on Lovenox 40 mg prior to transfer  - changed to heparin 5000 Q12H for now, pending EUS procedure  - ambulate with cane, as tolerated - albumin = 2.4  - in the setting of 47 lbs weight loss due to poor oral intake from feeling full, despite appetite for various foods  - also in the setting of pancreatic mass, proteinuria of 100  - may need nutrition consult, protein supplement  - continue PTA MVI

## 2019-08-08 NOTE — ACUTE INTERFACILITY TRANSFER NOTE - PLAN OF CARE
TRANSFER TO [facility] to Dr Pina for EUS TRANSFER TO [facility] to Dr Pina for EUS elevated LFTs  - US abdominal: CBD dilated at 1.2 cm.  - LFTs in AM  - Acute hepatitis panel.  -  mass found   - IVF hydration  - GI eval noted  monitor lfts. Rocephin empirically  - no currrent urine culture and ABX already started. Cont home meds resolved continue to follow mag and phosph and potassium. currently on cilostazol

## 2019-08-08 NOTE — H&P ADULT - NSICDXPASTMEDICALHX_GEN_ALL_CORE_FT
PAST MEDICAL HISTORY:  HTN (hypertension)     Smoking history PAST MEDICAL HISTORY:  Claudication ~ chiefly of RLE    HTN (hypertension)     Iron deficiency anemia     Smoking history ~ quit ~ 2017

## 2019-08-08 NOTE — PROGRESS NOTE ADULT - ASSESSMENT
68f with 2 weeks of diarrhea and now with pain to the ruq with multiple stones rto the ruq
68f with 2 weeks of diarrhea and now with pain to the ruq with multiple stones rto the ruq       IMPROVE VTE Individual Risk Assessment    RISK                                                          Points  [] Previous VTE                                           3  [] Thrombophilia                                        2  [] Lower limb paralysis                              2   [] Current Cancer                                       2   [] Immobilization > 24 hrs                        1  [] ICU/CCU stay > 24 hours                       1  [] Age > 60                                                   1    IMPROVE VTE Score:
68f with 2 weeks of diarrhea and now with pain to the ruq with multiple stones rto the ruq. Pancreatic  head mass will need to ercp vs endoscopic US with biopsy vs IR biopsy. May need surgery   will need to consider transfer to a tertiary center

## 2019-08-08 NOTE — ACUTE INTERFACILITY TRANSFER NOTE - SECONDARY DIAGNOSIS.
Obstructive cholestatic liver disease Urinary tract infection without hematuria, site unspecified Essential hypertension Hypokalemia Peripheral vascular disease

## 2019-08-08 NOTE — H&P ADULT - PROBLEM SELECTOR PLAN 7
- PTA diagnosis and on iron supplements BID  - Hgb = 10.3, with report of occasional chills  - ferrous sulfate held for now; please restart when optimal

## 2019-08-08 NOTE — H&P ADULT - NSHPSOCIALHISTORY_GEN_ALL_CORE
Lives with children  History of smoking; 1pk lasting 2 weeks x ~ 35 to 40 years.  Quit in 2017  No history of alcohol abuse  No history of illegal drug use    Ambulates with the aid of a cane at baseline

## 2019-08-08 NOTE — H&P ADULT - MUSCULOSKELETAL
details… detailed exam ROM intact/no joint swelling/no joint warmth/no calf tenderness/RLE smaller in size than LLE

## 2019-08-08 NOTE — CONSULT NOTE ADULT - PROBLEM SELECTOR RECOMMENDATION 9
Pancreas Mass with CBD Dilatation.  Obstructive Painless Jaundice.  R/O Pancreas Neoplasm.  ERCP and Biopsy for Tissue Dx.

## 2019-08-08 NOTE — PROGRESS NOTE ADULT - PROBLEM SELECTOR PLAN 1
- elevated LFTs  - US abdominal: CBD dilated at 1.2 cm.  - LFTs in AM  - Acute hepatitis panel.  -  MRCP dutal dilitaation  will need ct enterography to evaluate for possible masss   - IVF hydration  - GI eval noted  monitor lfts
- elevated LFTs  - US abdominal: CBD dilated at 1.2 cm.  - LFTs in AM  - Acute hepatitis panel.  - Follow up MRCP.  - IVF hydration  - GI eval noted  monitor lfts
ancreatic  head mass will need to ercp vs endoscopic US with biopsy vs IR biopsy. May need surgery   will need to consider transfer to a tertiary center

## 2019-08-08 NOTE — H&P ADULT - ASSESSMENT
68 year old female, with past history significant for HTN, Anemia, Smoking, PVD, Claudication of R-LE, and RLE stent placement, presented to the floor, as direct transfer from [facility] secondary to need for "EUS and possible biopsy of pancreatic mass."  Vital signs upon arrival on the floor as follows: BP =  121/76, HR = 83, RR = 18, T = 36.9 C (98.4 F), O2 Sat = 97% on RA.  Diagnosed with Pancreatic mass, Obstructive cholestatic liver disease, Urinary tract infection without Hematuria, Hypokalemia, PVD, Essential Hypertension.  Admitted for further management of:

## 2019-08-08 NOTE — H&P ADULT - PROBLEM SELECTOR PLAN 5
- albumin = 2.4  - in the setting of 47 lbs weight loss due to poor oral intake from feeling full, despite appetite for various foods  - also in the setting of pancreatic mass, proteinuria of 100  - may need nutrition consult, protein supplement  - continue PTA MVI - rate at 49 bpm on ECG of 08/06/2019 (rate presently 84 bpm).  QTc = 471  - quite possibly due to medication side effects  - not on rate limiting meds  - takes loperamide, which reportedly has s/e of bradycardia, QTc prolongation  - f/u TSH level in the AM  - loperamide on hold

## 2019-08-08 NOTE — CONSULT NOTE ADULT - SUBJECTIVE AND OBJECTIVE BOX
REASON FOR CONSULTATION:  Obstructive Jaundice.    INTERVAL HISTORY:  Abdominal Pain.  Diarrhea for 2 weeks.      Allergies    No Known Allergies    Intolerances        MEDICATIONS  (STANDING):  amLODIPine   Tablet 10 milliGRAM(s) Oral daily  cefTRIAXone   IVPB 1000 milliGRAM(s) IV Intermittent every 24 hours  cilostazol 50 milliGRAM(s) Oral two times a day  enoxaparin Injectable 40 milliGRAM(s) SubCutaneous daily  morphine  - Injectable 4 milliGRAM(s) IV Push every 6 hours    MEDICATIONS  (PRN):  ALBUTerol    90 MICROgram(s) HFA Inhaler 1 Puff(s) Inhalation four times a day PRN Shortness of Breath and/or Wheezing  ondansetron Injectable 4 milliGRAM(s) IV Push every 6 hours PRN Nausea and/or Vomiting      Vital Signs Last 24 Hrs  T(C): 36.6 (08 Aug 2019 05:01), Max: 37.2 (07 Aug 2019 11:43)  T(F): 97.9 (08 Aug 2019 05:01), Max: 99 (07 Aug 2019 17:04)  HR: 67 (08 Aug 2019 05:01) (50 - 82)  BP: 149/69 (08 Aug 2019 05:01) (97/59 - 149/69)  BP(mean): --  RR: 17 (08 Aug 2019 05:01) (17 - 18)  SpO2: 98% (08 Aug 2019 05:01) (96% - 98%)    PHYSICAL EXAM:    EYES: EOMI, PERRLA, conjunctiva and sclera clear  CHEST/LUNG: Clear to percussion bilaterally;   HEART: Regular rate and rhythm;   ABDOMEN: Soft,   LYMPH: No lymphadenopathy noted.        LABS:                        10.3   5.16  )-----------( 294      ( 08 Aug 2019 07:31 )             31.7     08-08    139  |  104  |  3<L>  ----------------------------<  86  4.1   |  26  |  0.59    Ca    8.4<L>      08 Aug 2019 07:31  Phos  2.6     08-08  Mg     1.5     08-08    TPro  6.3  /  Alb  2.4<L>  /  TBili  5.8<H>  /  DBili  4.47<H>  /  AST  107<H>  /  ALT  122<H>  /  AlkPhos  766<H>  08-08            RADIOLOGY & ADDITIONAL STUDIES:  < from: CT Abdomen and Pelvis w/ IV Cont (08.07.19 @ 14:56) >  ies 3.    LIVER: There are scattered subcentimeter hypodensities in the liver, too   small to characterize.  BILE DUCTS: There is moderate to severe intra and extrahepatic biliary   ductal dilatation. The common bile duct measures up to 1.1 cm atits   midportion.  GALLBLADDER: Multiple stones are present within the gallbladder.  SPLEEN: Within normal limits.  PANCREAS: There is a ill-defined heterogeneous hypoenhancing mass in the   pancreatic head measuring approximately 3 x 3.3 cm axially on image   #42/series 3, measurement however is difficult as there is no    plane between this mass and the collapsed duodenum. There is marked   atrophy of the pancreatic body and tail with diffuse pancreatic ductal   dilatation measuring up to 7 mm.  ADRENALS: Within normal limits.  KIDNEYS/URETERS: No renal stones or hydronephrosis.    BLADDER: Within normal limits.  REPRODUCTIVE ORGANS: Hysterectomy.    BOWEL: No bowel obstruction. Appendix is within normal limits. There is   colonicdiverticulosis without evidence for diverticulitis.  PERITONEUM: No ascites.  VESSELS: There is normal caliber aorta and IVC. There is severe   atherosclerosis of the aorta and iliac arteries, with prominent amount of   intraluminal thrombus. The right common iliac and external iliac arteries   are thrombosed, likely chronic. A femorofemoral bypass graft is present   the patency of which cannot be certain. Clinical correlation is   recommended. The pancreatic mass abuts the superior mesenteric vein and   portal vein. The superior mesenteric artery is patent and appears intact.  RETROPERITONEUM/LYMPH NODES: No lymphadenopathy. Enlarged periportal   lymph nodes are present. An aortocaval lymph node on image #32/series 3   measures 2.6 x 1.2 cm.  ABDOMINAL WALL: Mild diffuse soft tissue anasarca.  BONES: Multilevel degenerative change of the thoracolumbar spine and   bilateral hips.    IMPRESSION:     Hypoenhancing 3 x 3.3 cm solid mass in the pancreatic head, concerning   for an adenocarcinoma. It is inseparable from the adjacent duodenum.   Details are as above.    Diffuse biliary ductal dilatation. Multiple large gallstones.    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 The patency of the femorofemoral graft cannot be determined on   this exam. Clinical correlation is recommended.        < end of copied text >      PATHOLOGY:

## 2019-08-08 NOTE — H&P ADULT - PROBLEM SELECTOR PLAN 2
- CT scan w/ "Diffuse biliary ductal dilatation. Multiple large gallstones;" CBD dilated at 1.2 cm on US  - in the setting of pancreatic lesion (see above)  - being followed by GI and transferred to [facility] for EUS  - f/u LFTs  - ensure optimal hydration  - antiemetic, analgesic PRN

## 2019-08-09 DIAGNOSIS — R00.1 BRADYCARDIA, UNSPECIFIED: ICD-10-CM

## 2019-08-09 PROBLEM — I10 ESSENTIAL (PRIMARY) HYPERTENSION: Chronic | Status: ACTIVE | Noted: 2019-08-05

## 2019-08-09 LAB
24R-OH-CALCIDIOL SERPL-MCNC: 11.7 NG/ML — LOW (ref 30–80)
ALBUMIN SERPL ELPH-MCNC: 3 G/DL — LOW (ref 3.3–5)
ALP SERPL-CCNC: 670 U/L — HIGH (ref 40–120)
ALT FLD-CCNC: 102 U/L — HIGH (ref 4–33)
ANION GAP SERPL CALC-SCNC: 11 MMO/L — SIGNIFICANT CHANGE UP (ref 7–14)
AST SERPL-CCNC: 103 U/L — HIGH (ref 4–32)
BILIRUB SERPL-MCNC: 5.4 MG/DL — HIGH (ref 0.2–1.2)
BUN SERPL-MCNC: 5 MG/DL — LOW (ref 7–23)
CALCIUM SERPL-MCNC: 9 MG/DL — SIGNIFICANT CHANGE UP (ref 8.4–10.5)
CHLORIDE SERPL-SCNC: 100 MMOL/L — SIGNIFICANT CHANGE UP (ref 98–107)
CO2 SERPL-SCNC: 24 MMOL/L — SIGNIFICANT CHANGE UP (ref 22–31)
CREAT SERPL-MCNC: 0.53 MG/DL — SIGNIFICANT CHANGE UP (ref 0.5–1.3)
FERRITIN SERPL-MCNC: 686.1 NG/ML — HIGH (ref 15–150)
GLUCOSE SERPL-MCNC: 118 MG/DL — HIGH (ref 70–99)
HCT VFR BLD CALC: 30.2 % — LOW (ref 34.5–45)
HGB BLD-MCNC: 10.1 G/DL — LOW (ref 11.5–15.5)
IRON SATN MFR SERPL: 151 UG/DL — SIGNIFICANT CHANGE UP (ref 140–530)
IRON SATN MFR SERPL: 69 UG/DL — SIGNIFICANT CHANGE UP (ref 30–160)
LIDOCAIN IGE QN: 3.9 U/L — LOW (ref 7–60)
MAGNESIUM SERPL-MCNC: 1.6 MG/DL — SIGNIFICANT CHANGE UP (ref 1.6–2.6)
MCHC RBC-ENTMCNC: 27.3 PG — SIGNIFICANT CHANGE UP (ref 27–34)
MCHC RBC-ENTMCNC: 33.4 % — SIGNIFICANT CHANGE UP (ref 32–36)
MCV RBC AUTO: 81.6 FL — SIGNIFICANT CHANGE UP (ref 80–100)
NRBC # FLD: 0.03 K/UL — SIGNIFICANT CHANGE UP (ref 0–0)
PHOSPHATE SERPL-MCNC: 3 MG/DL — SIGNIFICANT CHANGE UP (ref 2.5–4.5)
PLATELET # BLD AUTO: 286 K/UL — SIGNIFICANT CHANGE UP (ref 150–400)
PMV BLD: 10.7 FL — SIGNIFICANT CHANGE UP (ref 7–13)
POTASSIUM SERPL-MCNC: 3.6 MMOL/L — SIGNIFICANT CHANGE UP (ref 3.5–5.3)
POTASSIUM SERPL-SCNC: 3.6 MMOL/L — SIGNIFICANT CHANGE UP (ref 3.5–5.3)
PROT SERPL-MCNC: 6 G/DL — SIGNIFICANT CHANGE UP (ref 6–8.3)
RBC # BLD: 3.7 M/UL — LOW (ref 3.8–5.2)
RBC # FLD: 16.7 % — HIGH (ref 10.3–14.5)
RETICS #: 63 K/UL — SIGNIFICANT CHANGE UP (ref 25–125)
RETICS/RBC NFR: 1.7 % — SIGNIFICANT CHANGE UP (ref 0.5–2.5)
SODIUM SERPL-SCNC: 135 MMOL/L — SIGNIFICANT CHANGE UP (ref 135–145)
TRANSFERRIN SERPL-MCNC: 132 MG/DL — LOW (ref 200–360)
TSH SERPL-MCNC: 0.67 UIU/ML — SIGNIFICANT CHANGE UP (ref 0.27–4.2)
UIBC SERPL-MCNC: 81.7 UG/DL — LOW (ref 110–370)
WBC # BLD: 4.53 K/UL — SIGNIFICANT CHANGE UP (ref 3.8–10.5)
WBC # FLD AUTO: 4.53 K/UL — SIGNIFICANT CHANGE UP (ref 3.8–10.5)

## 2019-08-09 PROCEDURE — 99233 SBSQ HOSP IP/OBS HIGH 50: CPT

## 2019-08-09 PROCEDURE — 99222 1ST HOSP IP/OBS MODERATE 55: CPT

## 2019-08-09 RX ORDER — ERGOCALCIFEROL 1.25 MG/1
50000 CAPSULE ORAL
Refills: 0 | Status: DISCONTINUED | OUTPATIENT
Start: 2019-08-09 | End: 2019-08-10

## 2019-08-09 RX ADMIN — Medication 1 TABLET(S): at 11:27

## 2019-08-09 RX ADMIN — CILOSTAZOL 50 MILLIGRAM(S): 100 TABLET ORAL at 18:30

## 2019-08-09 RX ADMIN — CEFTRIAXONE 100 MILLIGRAM(S): 500 INJECTION, POWDER, FOR SOLUTION INTRAMUSCULAR; INTRAVENOUS at 00:56

## 2019-08-09 RX ADMIN — CILOSTAZOL 50 MILLIGRAM(S): 100 TABLET ORAL at 07:14

## 2019-08-09 RX ADMIN — AMLODIPINE BESYLATE 10 MILLIGRAM(S): 2.5 TABLET ORAL at 07:14

## 2019-08-09 RX ADMIN — SODIUM CHLORIDE 75 MILLILITER(S): 9 INJECTION INTRAMUSCULAR; INTRAVENOUS; SUBCUTANEOUS at 00:57

## 2019-08-09 RX ADMIN — HEPARIN SODIUM 5000 UNIT(S): 5000 INJECTION INTRAVENOUS; SUBCUTANEOUS at 07:14

## 2019-08-09 RX ADMIN — CILOSTAZOL 50 MILLIGRAM(S): 100 TABLET ORAL at 00:56

## 2019-08-09 RX ADMIN — CEFTRIAXONE 100 MILLIGRAM(S): 500 INJECTION, POWDER, FOR SOLUTION INTRAMUSCULAR; INTRAVENOUS at 23:34

## 2019-08-09 RX ADMIN — HEPARIN SODIUM 5000 UNIT(S): 5000 INJECTION INTRAVENOUS; SUBCUTANEOUS at 18:30

## 2019-08-09 NOTE — CONSULT NOTE ADULT - ASSESSMENT
68F hx CAD, PVD s/p fem-fem bypass with graft, HTN, former smoker who presents with newly diagnosed pancreatic head mass    - GI planning for EUS/biopsy on Monday, f/u plan and recs  - Recommend oncology evaluation  - CT chest to further evaluate pulmonary nodules  - CEA with AM labs (ordered)  - Further recommendations pending full work up  - Rest of care per primary, will follow    d/w Dr. Justin Jeffries, PGY-2  Surgical Oncology (D Team)  [id number] with questions

## 2019-08-09 NOTE — CONSULT NOTE ADULT - SUBJECTIVE AND OBJECTIVE BOX
General Surgery Consult  Consulting surgical team: Surgical Oncology  Consulting attending: Shawn Anderson    HPI:  68 year old female, with past history significant for HTN, Anemia, Smoking, PVD, Claudication of R-LE, and RLE stent placement, presented to the floor, as direct transfer from [facility] secondary to need for "EUS and possible biopsy of pancreatic mass."  Seen and evaluated at bedside; NAD.  Patient relates being contacted by her PMD and advised to present to the hospital ED for evaluation after findings of abnormal liver function tests.  Prior to then, patient notes persistent mid abdominal pain, without radiation, for the past ~ 2 to 3 weeks, and associated with nausea, but no vomiting.  Indicates weight loss of 47 pounds from 2019 to 2019.  This was in the setting of decreased oral intake; patient would have appetite for various foods, but would feel full on attempted eating.  Also relates persistent diarrhea, for which she was prescribed loperamide PRN.  Reports chronic intermittent chills, which was presumed to be due to previously diagnosed anemia.  No reports of fevers, sweating, headaches, dizziness, chest pain, shortness of breath, vomiting.    Vital signs upon arrival on the floor as follows: BP =  121/76, HR = 83, RR = 18, T = 36.9 C (98.4 F), O2 Sat = 97% on RA.  Diagnosed with Pancreatic mass, Obstructive cholestatic liver disease, Urinary tract infection without Hematuria, Hypokalemia, PVD, Essential Hypertension. (08 Aug 2019 20:28)    Patient reports she feels well. Denies abdominal pain, pruritis, N/V. Follows up with Dr. Gould from vascular surgery for her fem-fem bypass. She last saw him in April and reports he was not planning any further intervention.      PAST MEDICAL HISTORY:  Iron deficiency anemia  Claudication  Anemia  Smoking history  HTN (hypertension)      PAST SURGICAL HISTORY:  History of colonoscopy  History of intravascular stent placement  History of  section      MEDICATIONS:  ALBUTerol    90 MICROgram(s) HFA Inhaler 2 Puff(s) Inhalation every 6 hours PRN  amLODIPine   Tablet 10 milliGRAM(s) Oral daily  cefTRIAXone   IVPB 1000 milliGRAM(s) IV Intermittent every 24 hours  cilostazol 50 milliGRAM(s) Oral two times a day  heparin  Injectable 5000 Unit(s) SubCutaneous every 12 hours  morphine  - Injectable 4 milliGRAM(s) IV Push every 6 hours PRN  multivitamin 1 Tablet(s) Oral daily  ondansetron Injectable 4 milliGRAM(s) IV Push every 6 hours PRN  sodium chloride 0.9%. 1000 milliLiter(s) IV Continuous <Continuous>      ALLERGIES:  No Known Allergies      VITALS & I/Os:  Vital Signs Last 24 Hrs  T(C): 37.1 (09 Aug 2019 07:01), Max: 37.1 (08 Aug 2019 17:10)  T(F): 98.8 (09 Aug 2019 07:01), Max: 98.8 (08 Aug 2019 17:10)  HR: 74 (09 Aug 2019 07:01) (67 - 83)  BP: 144/76 (09 Aug 2019 07:01) (121/76 - 144/76)  BP(mean): --  RR: 18 (09 Aug 2019 07:01) (17 - 18)  SpO2: 98% (09 Aug 2019 07:01) (97% - 98%)    I&O's Summary      PHYSICAL EXAM:  General: No acute distress  Respiratory: Nonlabored  Abdominal: Soft, nondistended, nontender. No rebound or guarding. +Courvoisier's sign  Extremities: Warm    LABS:                        10.1   4.53  )-----------( 286      ( 09 Aug 2019 06:24 )             30.2         135  |  100  |  5<L>  ----------------------------<  118<H>  3.6   |  24  |  0.53    Ca    9.0      09 Aug 2019 06:24  Phos  3.0       Mg     1.6         TPro  6.0  /  Alb  3.0<L>  /  TBili  5.4<H>  /  DBili  x   /  AST  103<H>  /  ALT  102<H>  /  AlkPhos  670<H>      Lactate:        IMAGING:  < from: CT Abdomen and Pelvis w/ IV Cont (19 @ 14:56) >  EXAM:  CT ABDOMEN AND PELVIS IC                            PROCEDURE DATE:  2019          INTERPRETATION:  CLINICAL INFORMATION: Jaundice, dilated CBD. Assess for   pancreatic lesion.    COMPARISON: MRI of the abdomen 2019.    PROCEDURE:   CT of the Abdomen and Pelvis was performed with intravenous contrast.   Arterial and Portal Venous phases were acquired.  Intravenous contrast: 90 ml Omnipaque 350. 10 ml discarded.  Oral contrast: Water was administered.  Sagittal and coronal reformats were performed.    FINDINGS:    LOWER CHEST: There is an elongated nodular opacity in the right middle   lobe anteriorly measuring approximately 1.5 x 0.9 cm on image #2/series 3   which may represent pulmonary nodule versus mucoid impaction of airway.   Smaller nodular opacities are present adjacently as seen on image #1 and   image #3/series 3 in the right middle lobe. Nodular opacities are also   present in the lingula on image #14/series 3 and in the anterior left   lower lobe on image #19/series 3.    LIVER: There are scattered subcentimeter hypodensities in the liver, too   small to characterize.  BILE DUCTS: There is moderate to severe intra and extrahepatic biliary   ductal dilatation. The common bile duct measures up to 1.1 cm atits   midportion.  GALLBLADDER: Multiple stones are present within the gallbladder.  SPLEEN: Within normal limits.  PANCREAS: There is a ill-defined heterogeneous hypoenhancing mass in the   pancreatic head measuring approximately 3 x 3.3 cm axially on image   #42/series 3, measurement however is difficult as there is no    plane between this mass and the collapsed duodenum. There is marked   atrophy of the pancreatic body and tail with diffuse pancreatic ductal   dilatation measuring up to 7 mm.  ADRENALS: Within normal limits.  KIDNEYS/URETERS: No renal stones or hydronephrosis.    BLADDER: Within normal limits.  REPRODUCTIVE ORGANS: Hysterectomy.    BOWEL: No bowel obstruction. Appendix is within normal limits. There is   colonicdiverticulosis without evidence for diverticulitis.  PERITONEUM: No ascites.  VESSELS: There is normal caliber aorta and IVC. There is severe   atherosclerosis of the aorta and iliac arteries, with prominent amount of   intraluminal thrombus. The right common iliac and external iliac arteries   are thrombosed, likely chronic. A femorofemoral bypass graft is present   the patency of which cannot be certain. Clinical correlation is   recommended. The pancreatic mass abuts the superior mesenteric vein and   portal vein. The superior mesenteric artery is patent and appears intact.  RETROPERITONEUM/LYMPH NODES: No lymphadenopathy. Enlarged periportal   lymph nodes are present. An aortocaval lymph node on image #32/series 3   measures 2.6 x 1.2 cm.  ABDOMINAL WALL: Mild diffuse soft tissue anasarca.  BONES: Multilevel degenerative change of the thoracolumbar spine and   bilateral hips.    IMPRESSION:     Hypoenhancing 3 x 3.3 cm solid mass in the pancreatic head, concerning   for an adenocarcinoma. It is inseparable from the adjacent duodenum.   Details are as above.    Diffuse biliary ductal dilatation. Multiple large gallstones.    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 The patency of the femorofemoral graft cannot be determined on   this exam. Clinical correlation is recommended.      GABBIE DUFF M.D. ATTENDING RADIOLOGIST  This document has been electronically signed. Aug  7 2019  3:26PM          < end of copied text >

## 2019-08-09 NOTE — PROGRESS NOTE ADULT - PROBLEM SELECTOR PLAN 2
- elevated LFTs  - US abdominal: CBD dilated at 1.2 cm.  - LFTs in AM  - Acute hepatitis panel.  -  mass found s   - IVF hydration  - GI eval noted  monitor lfts

## 2019-08-09 NOTE — PROGRESS NOTE ADULT - PROBLEM SELECTOR PLAN 1
ancreatic  head mass will need to ercp vs endoscopic US with biopsy vs IR biopsy. May need surgery   will need to consider transfer to a tertiary center Pancreatic  head mass will need to ercp vs endoscopic US with biopsy vs IR biopsy. May need surgery   will need to consider transfer to a tertiary center

## 2019-08-09 NOTE — CONSULT NOTE ADULT - ATTENDING COMMENTS
The patient was off of the floor at Radiology during rounds. The case was discussed with the gastroenterology fellow. I agree with the above note, with the following additions/exceptions as detailed below.    Patient is found to have obstructive jaundice with a pancreatic head mass. Will tentatively plan for EUS/biopsy +/- ERCP on Monday. Please follow up additional GI notes for updates. The patient was off of the floor at Radiology during rounds 8/10/19. The case was discussed with the gastroenterology fellow and the chart was reviewed on 8/10. The patient was seen and examined at bedside on 8/11/19. I agree with the above note, with the following additions/exceptions as detailed below.    68F with hypertension, peripheral vascular disease with RLE claudication s/p stent who was transferred from [facility] for further evaluation of obstructive jaundice and pancreatic head mass. Imaging also reveals a liver lesion and pulmonary lesions. She is hemodynamically stable and has no signs of cholangitis. She reported pruritus at home that has since resolved.     Plan:  - Keep NPO past midnight on Sunday evening for possible EUS/biopsy +/- ERCP on Monday  - Will review imaging with [facility] attending on Monday morning  - Follow up Oncology and Surgical Oncology recs  - Supportive care per primary team The patient was off of the floor at Radiology during rounds 8/10/19. The case was discussed with the gastroenterology fellow and the chart was reviewed on 8/10. The patient was seen and examined at bedside on 8/11/19. I agree with the above note, with the following additions/exceptions as detailed below.    68F with hypertension, peripheral vascular disease with RLE claudication s/p stent who was transferred from [facility] for further evaluation of obstructive jaundice and pancreatic head mass. Imaging also reveals pulmonary nodules. She is hemodynamically stable and has no signs of cholangitis. She reported pruritus at home that has since resolved.     Plan:  - Keep NPO past midnight on Sunday evening for possible EUS/biopsy +/- ERCP on Monday  - Will review imaging with [facility] attending on Monday morning  - Follow up Oncology and Surgical Oncology recs  - Supportive care per primary team

## 2019-08-09 NOTE — PROGRESS NOTE ADULT - SUBJECTIVE AND OBJECTIVE BOX
Transferred fro VS Transferred fro VS  Patient is a 68y old  Female who presents with a chief complaint of Pancreatic mass, Obstructive cholestatic liver disease, Urinary tract infection without Hematuria, Hypokalemia, PVD, Essential Hypertension. (09 Aug 2019 09:54)      SUBJECTIVE / OVERNIGHT EVENTS:  Patient is happy to be at [facility] as is awaiting GI    MEDICATIONS  (STANDING):  amLODIPine   Tablet 10 milliGRAM(s) Oral daily  cefTRIAXone   IVPB 1000 milliGRAM(s) IV Intermittent every 24 hours  cilostazol 50 milliGRAM(s) Oral two times a day  heparin  Injectable 5000 Unit(s) SubCutaneous every 12 hours  multivitamin 1 Tablet(s) Oral daily  sodium chloride 0.9%. 1000 milliLiter(s) (75 mL/Hr) IV Continuous <Continuous>    MEDICATIONS  (PRN):  ALBUTerol    90 MICROgram(s) HFA Inhaler 2 Puff(s) Inhalation every 6 hours PRN Shortness of Breath and/or Wheezing  morphine  - Injectable 4 milliGRAM(s) IV Push every 6 hours PRN Moderate Pain (4 - 6) or Severe Pain (7 - 10)  ondansetron Injectable 4 milliGRAM(s) IV Push every 6 hours PRN Nausea and/or Vomiting    Vital Signs Last 24 Hrs  T(C): 37.1 (09 Aug 2019 07:01), Max: 37.1 (08 Aug 2019 11:58)  T(F): 98.8 (09 Aug 2019 07:01), Max: 98.8 (08 Aug 2019 17:10)  HR: 74 (09 Aug 2019 07:01) (67 - 83)  BP: 144/76 (09 Aug 2019 07:01) (121/76 - 144/76)  BP(mean): --  RR: 18 (09 Aug 2019 07:01) (17 - 18)  SpO2: 98% (09 Aug 2019 07:01) (97% - 99%)    PHYSICAL EXAM:  GENERAL: NAD, well-developed  HEAD:  Atraumatic, Normocephalic  EYES: EOMI, PERRLA, conjunctiva and sclera clear  NECK: Supple, No JVD  CHEST/LUNG: Clear to auscultation bilaterally; No wheeze  HEART: Regular rate and rhythm; No murmurs, rubs, or gallops  ABDOMEN: Soft, Nontender, Nondistended; Bowel sounds present  EXTREMITIES:  2+ Peripheral Pulses, No clubbing, cyanosis, or edema  PSYCH: AAOx3  NEUROLOGY: non-focal  SKIN: No rashes or lesions    LABS:                        10.1   4.53  )-----------( 286      ( 09 Aug 2019 06:24 )             30.2     08-09    135  |  100  |  5<L>  ----------------------------<  118<H>  3.6   |  24  |  0.53    Ca    9.0      09 Aug 2019 06:24  Phos  3.0     08-09  Mg     1.6     08-09    TPro  6.0  /  Alb  3.0<L>  /  TBili  5.4<H>  /  DBili  x   /  AST  103<H>  /  ALT  102<H>  /  AlkPhos  670<H>  08-09              RADIOLOGY & ADDITIONAL TESTS:    Imaging Personally Reviewed:    Consultant(s) Notes Reviewed:      Care Discussed with Consultants/Other Providers:

## 2019-08-09 NOTE — PROGRESS NOTE ADULT - ASSESSMENT
68f with 2 weeks of diarrhea and now with pain to the ruq with multiple stones rto the ruq. Pancreatic  head mass will need to ercp vs endoscopic US with biopsy vs IR biopsy. May need surgery   will need to consider transfer to a tertiary center

## 2019-08-09 NOTE — CONSULT NOTE ADULT - SUBJECTIVE AND OBJECTIVE BOX
Chief Complaint:  Patient is a 68y old  Female who presents with a chief complaint of Pancreatic mass, Obstructive cholestatic liver disease, Urinary tract infection without Hematuria, Hypokalemia, PVD, Essential Hypertension. (09 Aug 2019 14:07)      HPI:  68 year old female, with past history significant for HTN, Anemia, Smoking, PVD, Claudication of R-LE, and RLE stent placement, presented to the floor, as direct transfer from [facility] secondary to need for "EUS and possible biopsy of pancreatic mass."  Seen and evaluated at bedside; NAD.  Patient relates being contacted by her PMD and advised to present to the hospital ED for evaluation after findings of abnormal liver function tests.  Prior to then, patient notes persistent mid abdominal pain, without radiation, for the past ~ 2 to 3 weeks, and associated with nausea, but no vomiting.  Indicates weight loss of 47 pounds from 2019 to 2019.  This was in the setting of decreased oral intake; patient would have appetite for various foods, but would feel full on attempted eating.  Also relates persistent diarrhea, for which she was prescribed loperamide PRN.  Reports chronic intermittent chills, which was presumed to be due to previously diagnosed anemia.  No reports of fevers, sweating, headaches, dizziness, chest pain, shortness of breath, vomiting.    Vital signs upon arrival on the floor as follows: BP =  121/76, HR = 83, RR = 18, T = 36.9 C (98.4 F), O2 Sat = 97% on RA.  Diagnosed with Pancreatic mass, Obstructive cholestatic liver disease, Urinary tract infection without Hematuria, Hypokalemia, PVD, Essential Hypertension. (08 Aug 2019 20:28)    GI consulted for pancreatic lesion on imaging.      Allergies:  No Known Allergies      Home Medications:  amLODIPine 10 mg oral tablet: 1 tab(s) orally once a day (08 Aug 2019 22:34)  cefTRIAXone 1 g intravenous injection:  (08 Aug 2019 22:38)  cilostazol 50 mg oral tablet: 1 tab(s) orally 2 times a day (08 Aug 2019 22:34)  ferrous sulfate: orally 2 times a day (08 Aug 2019 22:34)  loperamide 2 mg oral tablet: 1 tab(s) orally 4 times a day, As Needed - for diarrhea (09 Aug 2019 03:57)  morphine 4 mg/mL injectable solution:  (08 Aug 2019 22:39)  Multiple Vitamins oral tablet: 1 tab(s) orally once a day (08 Aug 2019 22:33)  ondansetron 2 mg/mL injectable solution: 2  injectable every 6 hours, As Needed - for nausea (08 Aug 2019 22:39)  Ventolin HFA 90 mcg/inh inhalation aerosol: 2 puff(s) inhaled (08 Aug 2019 22:34)    Hospital Medications:  ALBUTerol    90 MICROgram(s) HFA Inhaler 2 Puff(s) Inhalation every 6 hours PRN  amLODIPine   Tablet 10 milliGRAM(s) Oral daily  cefTRIAXone   IVPB 1000 milliGRAM(s) IV Intermittent every 24 hours  cilostazol 50 milliGRAM(s) Oral two times a day  ergocalciferol 97077 Unit(s) Oral every week  heparin  Injectable 5000 Unit(s) SubCutaneous every 12 hours  morphine  - Injectable 4 milliGRAM(s) IV Push every 6 hours PRN  multivitamin 1 Tablet(s) Oral daily  ondansetron Injectable 4 milliGRAM(s) IV Push every 6 hours PRN  sodium chloride 0.9%. 1000 milliLiter(s) IV Continuous <Continuous>      PMHX/PSHX:  Iron deficiency anemia  Claudication  Anemia  Smoking history  HTN (hypertension)  History of colonoscopy  History of intravascular stent placement  History of  section      Family history:  Family history of cancer  Family history of hypertension      Social History:  No history of tobacco use, EtOH use or illicit drug use     ROS:     General:  No wt loss, fevers, chills, night sweats, fatigue,   Eyes:  Good vision, no reported pain  ENT:  No sore throat, pain, runny nose, dysphagia  CV:  No pain, palpitations, hypo/hypertension  Resp:  No dyspnea, cough, tachypnea, wheezing  GI:  See HPI  :  No pain, bleeding, incontinence, nocturia  Muscle:  No pain, weakness  Neuro:  No weakness, tingling, memory problems  Psych:  No fatigue, insomnia, mood problems, depression  Endocrine:  No polyuria, polydipsia, cold/heat intolerance  Heme:  No petechiae, ecchymosis, easy bruisability  Skin:  No rash, edema      PHYSICAL EXAM:     GENERAL:  Appears stated age, well-groomed, well-nourished, no distress  HEENT:  NC/AT,  conjunctivae clear and pink,  no JVD  CHEST:  Full & symmetric excursion, no increased effort, breath sounds clear  HEART:  Regular rhythm, S1, S2, no murmur/rub/S3/S4, no abdominal bruit, no edema  ABDOMEN:  Soft, non-tender, non-distended, normoactive bowel sounds,  no masses ,  EXTREMITIES:  no cyanosis,clubbing or edema  SKIN:  No rash/erythema/ecchymoses/petechiae/wounds/abscess/warm/dry  NEURO:  Alert, oriented    Vital Signs:  Vital Signs Last 24 Hrs  T(C): 36.9 (09 Aug 2019 14:28), Max: 37.1 (09 Aug 2019 07:01)  T(F): 98.5 (09 Aug 2019 14:28), Max: 98.8 (09 Aug 2019 07:01)  HR: 75 (09 Aug 2019 14:28) (74 - 83)  BP: 115/62 (09 Aug 2019 14:28) (115/62 - 144/76)  BP(mean): --  RR: 19 (09 Aug 2019 14:28) (18 - 19)  SpO2: 98% (09 Aug 2019 14:28) (97% - 98%)  Daily Height in cm: 180.34 (08 Aug 2019 21:52)    Daily     LABS:                        10.1   4.53  )-----------( 286      ( 09 Aug 2019 06:24 )             30.2         135  |  100  |  5<L>  ----------------------------<  118<H>  3.6   |  24  |  0.53    Ca    9.0      09 Aug 2019 06:24  Phos  3.0       Mg     1.6         TPro  6.0  /  Alb  3.0<L>  /  TBili  5.4<H>  /  DBili  x   /  AST  103<H>  /  ALT  102<H>  /  AlkPhos  670<H>      LIVER FUNCTIONS - ( 09 Aug 2019 06:24 )  Alb: 3.0 g/dL / Pro: 6.0 g/dL / ALK PHOS: 670 u/L / ALT: 102 u/L / AST: 103 u/L / GGT: x               Amylase Serum--      Lipase serum3.9       Ammonia--      Imaging:  < from: CT Abdomen and Pelvis w/ IV Cont (19 @ 14:56) >    EXAM:  CT ABDOMEN AND PELVIS IC                            PROCEDURE DATE:  2019          INTERPRETATION:  CLINICAL INFORMATION: Jaundice, dilated CBD. Assess for   pancreatic lesion.    COMPARISON: MRI of the abdomen 2019.    PROCEDURE:   CT of the Abdomen and Pelvis was performed with intravenous contrast.   Arterial and Portal Venous phases were acquired.  Intravenous contrast: 90 ml Omnipaque 350. 10 ml discarded.  Oral contrast: Water was administered.  Sagittal and coronal reformats were performed.    FINDINGS:    LOWER CHEST: There is an elongated nodular opacity in the right middle   lobe anteriorly measuring approximately 1.5 x 0.9 cm on image #2/series 3   which may represent pulmonary nodule versus mucoid impaction of airway.   Smaller nodular opacities are present adjacently as seen on image #1 and   image #3/series 3 in the right middle lobe. Nodular opacities are also   present in the lingula on image #14/series 3 and in the anterior left   lower lobe on image #19/series 3.    LIVER: There are scattered subcentimeter hypodensities in the liver, too   small to characterize.  BILE DUCTS: There is moderate to severe intra and extrahepatic biliary   ductal dilatation. The common bile duct measures up to 1.1 cm atits   midportion.  GALLBLADDER: Multiple stones are present within the gallbladder.  SPLEEN: Within normal limits.  PANCREAS: There is a ill-defined heterogeneous hypoenhancing mass in the   pancreatic head measuring approximately 3 x 3.3 cm axially on image   #42/series 3, measurement however is difficult as there is no    plane between this mass and the collapsed duodenum. There is marked   atrophy of the pancreatic body and tail with diffuse pancreatic ductal   dilatation measuring up to 7 mm.  ADRENALS: Within normal limits.  KIDNEYS/URETERS: No renal stones or hydronephrosis.    BLADDER: Within normal limits.  REPRODUCTIVE ORGANS: Hysterectomy.    BOWEL: No bowel obstruction. Appendix is within normal limits. There is   colonicdiverticulosis without evidence for diverticulitis.  PERITONEUM: No ascites.  VESSELS: There is normal caliber aorta and IVC. There is severe   atherosclerosis of the aorta and iliac arteries, with prominent amount of   intraluminal thrombus. The right common iliac and external iliac arteries   are thrombosed, likely chronic. A femorofemoral bypass graft is present   the patency of which cannot be certain. Clinical correlation is   recommended. The pancreatic mass abuts the superior mesenteric vein and   portal vein. The superior mesenteric artery is patent and appears intact.  RETROPERITONEUM/LYMPH NODES: No lymphadenopathy. Enlarged periportal   lymph nodes are present. An aortocaval lymph node on image #32/series 3   measures 2.6 x 1.2 cm.  ABDOMINAL WALL: Mild diffuse soft tissue anasarca.  BONES: Multilevel degenerative change of the thoracolumbar spine and   bilateral hips.    IMPRESSION:     Hypoenhancing 3 x 3.3 cm solid mass in the pancreatic head, concerning   for an adenocarcinoma. It is inseparable from the adjacent duodenum.   Details are as above.    Diffuse biliary ductal dilatation. Multiple large gallstones.    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 The patency of the femorofemoral graft cannot be determined on   this exam. Clinical correlation is recommended.        GABBIE DUFF M.D. ATTENDING RADIOLOGIST  This document has been electronically signed. Aug  7 2019  3:26PM                < end of copied text >      < from: US Abdomen Complete (19 @ 18:50) >    EXAM:  US ABDOMINAL COMPLETE                            PROCEDURE DATE:  2019          INTERPRETATION:  CLINICAL INFORMATION: Abnormal LFTs    COMPARISON: None available.    TECHNIQUE: Sonography of the abdomen.     FINDINGS:    Liver: Withinnormal limits.    Bile ducts: Moderate intra and extrahepatic biliary ductal dilatation.   The common bile duct measures 1.2 cm.    Gallbladder: Gallstones        Pancreas: Limited visualization.    Spleen: 5.8 cm. Within normal limits.    Right kidney: 9.0 cm. No hydronephrosis.    Left kidney: 9.3 cm.  No hydronephrosis.    Ascites: None.    Aorta and IVC: Visualized portions are within normal limits.    IMPRESSION:     Moderate intra and extrahepatic biliary ductal dilatation. Common bile   duct measures 1.2 cm. Further evaluation with MRCP is recommended.    Gallstones.      NOLA THOMAS M.D., ATTENDING RADIOLOGIST  This document has been electronically signed. Aug  5 2019  6:55PM          < end of copied text >    < from: MR MRCP No Cont (19 @ 17:42) >    EXAM:  MR MRCP                            PROCEDURE DATE:  2019          INTERPRETATION:   MRCP / MR of the abdomen without gadolinium       CLINICAL INFORMATION:   Jaundice and dilated CBD on ultrasound, elevated   LFTs    TECHNIQUE:   [facility] MRCP was performed using a fast T2-weighted   technique.   Thin slab MRCP was also performed using breath-hold fast   T2-weighted technique. This data set was reconstructed as maximum   intensity pixel images and displayed in multiple rotations. In addition,   single shot fast spin-echo T2-weighted images of the upper abdomen were   obtained.     FINDINGS:   Ultrasound of the previous day    The intrahepatic ducts are dilated.  The common duct is dilated. It   measures up to 13 mm maximal diameter.    The common duct demonstrates   smooth tapering within the pancreatic head.  The pancreatic duct is not   dilated.  No mass or extrinsic mass-effect on the biliary or pancreatic   ducts is recognized.  No intraluminal defect or mass is recognized. The   pancreas is atrophic with pancreatic ductal dilatation    Multiple gallstones are identified.  The gallbladder wall is not   thickened. There is no pericholecystic fluid or inflammation.    The liver demonstrates homogeneous signal intensity without focal lesion.    Hepatic size and contours are maintained.  Hepatic and portal veins   appear patent.  They are not displaced. Spleen appears intact without   focal lesion.    The adrenal glands are intact.  The kidneys demonstrate no suspicious   lesion.  No hydronephrosis is recognized.        No enlarged lymph nodes are found.  No ascites is present.  Limited   evaluation of the remaining soft tissues is unremarkable.    IMPRESSION: Intra and extrahepatic biliary ductal dilatation as seen on   the ultrasound. There is also pancreatic ductal dilatation. No definite   choledocholithiasis. Further evaluation with contrast-enhanced CT or MR   of the abdomen for evaluation of possible pancreatic mass is suggested.  Multiple gallstones are identified as seen on the ultrasound    MARY ADDISON M.D.,ATTENDING RADIOLOGIST  This document has been electronically signed. Aug  6 2019  6:00PM                < end of copied text >

## 2019-08-09 NOTE — CONSULT NOTE ADULT - ASSESSMENT
Impression:  68 year old female, with past history significant for HTN, Anemia, Smoking, PVD, Claudication of R-LE, and RLE stent placement, who presents for evaluation of pancreatic mass from [facility].    #pancreatic mass causing obstructive jaudnice - concerning for adenocarcinoma  # PVD with RLE stent  #UTI on abx    Recs:  - will plan for EUS with biopsy/ERCP with likely stent placement for pancreatic mass  - trend CBC, CMP, INR  - if fevers, would culture and broaden antibiotics   - NPO after midnight on Sun night  - hold AC Mon morning Impression:  68 year old female, with past history significant for HTN, Anemia, Smoking, PVD, Claudication of R-LE, and RLE stent placement, who presents for evaluation of pancreatic mass from [facility].    #pancreatic mass causing obstructive jaudnice - concerning for adenocarcinoma  # PVD with RLE stent  #UTI on abx    Recs:  - will plan for EUS with biopsy/ERCP with likely stent placement for pancreatic mass  - pt reports having "very weak heart" - do not see TTE in system, would get this prior to endoscopy and if very abnormal would consider cardiology consultation  - trend CBC, CMP, INR  - if fevers, would culture and broaden antibiotics   - NPO after midnight on Sun night  - hold AC Mon morning

## 2019-08-10 LAB
ALBUMIN SERPL ELPH-MCNC: 3.2 G/DL — LOW (ref 3.3–5)
ALP SERPL-CCNC: 641 U/L — HIGH (ref 40–120)
ALT FLD-CCNC: 95 U/L — HIGH (ref 4–33)
ANION GAP SERPL CALC-SCNC: 11 MMO/L — SIGNIFICANT CHANGE UP (ref 7–14)
APTT BLD: 30.8 SEC — SIGNIFICANT CHANGE UP (ref 27.5–36.3)
AST SERPL-CCNC: 93 U/L — HIGH (ref 4–32)
BACTERIA UR CULT: SIGNIFICANT CHANGE UP
BILIRUB SERPL-MCNC: 5.4 MG/DL — HIGH (ref 0.2–1.2)
BUN SERPL-MCNC: 4 MG/DL — LOW (ref 7–23)
CALCIUM SERPL-MCNC: 8.7 MG/DL — SIGNIFICANT CHANGE UP (ref 8.4–10.5)
CEA SERPL-MCNC: 3.4 NG/ML — SIGNIFICANT CHANGE UP (ref 1–3.8)
CHLORIDE SERPL-SCNC: 101 MMOL/L — SIGNIFICANT CHANGE UP (ref 98–107)
CO2 SERPL-SCNC: 24 MMOL/L — SIGNIFICANT CHANGE UP (ref 22–31)
CREAT SERPL-MCNC: 0.5 MG/DL — SIGNIFICANT CHANGE UP (ref 0.5–1.3)
GLUCOSE SERPL-MCNC: 109 MG/DL — HIGH (ref 70–99)
HCT VFR BLD CALC: 30.8 % — LOW (ref 34.5–45)
HGB BLD-MCNC: 10.3 G/DL — LOW (ref 11.5–15.5)
INR BLD: 1.1 — SIGNIFICANT CHANGE UP (ref 0.88–1.17)
MAGNESIUM SERPL-MCNC: 1.5 MG/DL — LOW (ref 1.6–2.6)
MCHC RBC-ENTMCNC: 27.1 PG — SIGNIFICANT CHANGE UP (ref 27–34)
MCHC RBC-ENTMCNC: 33.4 % — SIGNIFICANT CHANGE UP (ref 32–36)
MCV RBC AUTO: 81.1 FL — SIGNIFICANT CHANGE UP (ref 80–100)
NRBC # FLD: 0 K/UL — SIGNIFICANT CHANGE UP (ref 0–0)
PHOSPHATE SERPL-MCNC: 3.3 MG/DL — SIGNIFICANT CHANGE UP (ref 2.5–4.5)
PLATELET # BLD AUTO: 304 K/UL — SIGNIFICANT CHANGE UP (ref 150–400)
PMV BLD: 10.4 FL — SIGNIFICANT CHANGE UP (ref 7–13)
POTASSIUM SERPL-MCNC: 3.6 MMOL/L — SIGNIFICANT CHANGE UP (ref 3.5–5.3)
POTASSIUM SERPL-SCNC: 3.6 MMOL/L — SIGNIFICANT CHANGE UP (ref 3.5–5.3)
PROT SERPL-MCNC: 6.3 G/DL — SIGNIFICANT CHANGE UP (ref 6–8.3)
PROTHROM AB SERPL-ACNC: 12.2 SEC — SIGNIFICANT CHANGE UP (ref 9.8–13.1)
RBC # BLD: 3.8 M/UL — SIGNIFICANT CHANGE UP (ref 3.8–5.2)
RBC # FLD: 16.7 % — HIGH (ref 10.3–14.5)
SODIUM SERPL-SCNC: 136 MMOL/L — SIGNIFICANT CHANGE UP (ref 135–145)
SPECIMEN SOURCE: SIGNIFICANT CHANGE UP
WBC # BLD: 4.22 K/UL — SIGNIFICANT CHANGE UP (ref 3.8–10.5)
WBC # FLD AUTO: 4.22 K/UL — SIGNIFICANT CHANGE UP (ref 3.8–10.5)

## 2019-08-10 PROCEDURE — 99232 SBSQ HOSP IP/OBS MODERATE 35: CPT | Mod: GC

## 2019-08-10 PROCEDURE — 71250 CT THORAX DX C-: CPT | Mod: 26

## 2019-08-10 RX ORDER — ERGOCALCIFEROL 1.25 MG/1
50000 CAPSULE ORAL
Refills: 0 | Status: DISCONTINUED | OUTPATIENT
Start: 2019-08-10 | End: 2019-08-10

## 2019-08-10 RX ORDER — ERGOCALCIFEROL 1.25 MG/1
50000 CAPSULE ORAL
Refills: 0 | Status: DISCONTINUED | OUTPATIENT
Start: 2019-08-10 | End: 2019-08-23

## 2019-08-10 RX ORDER — MAGNESIUM SULFATE 500 MG/ML
1 VIAL (ML) INJECTION ONCE
Refills: 0 | Status: COMPLETED | OUTPATIENT
Start: 2019-08-10 | End: 2019-08-10

## 2019-08-10 RX ADMIN — Medication 100 GRAM(S): at 09:12

## 2019-08-10 RX ADMIN — Medication 1 TABLET(S): at 12:13

## 2019-08-10 RX ADMIN — CILOSTAZOL 50 MILLIGRAM(S): 100 TABLET ORAL at 18:00

## 2019-08-10 RX ADMIN — CEFTRIAXONE 100 MILLIGRAM(S): 500 INJECTION, POWDER, FOR SOLUTION INTRAMUSCULAR; INTRAVENOUS at 21:27

## 2019-08-10 RX ADMIN — HEPARIN SODIUM 5000 UNIT(S): 5000 INJECTION INTRAVENOUS; SUBCUTANEOUS at 18:00

## 2019-08-10 RX ADMIN — HEPARIN SODIUM 5000 UNIT(S): 5000 INJECTION INTRAVENOUS; SUBCUTANEOUS at 06:58

## 2019-08-10 RX ADMIN — AMLODIPINE BESYLATE 10 MILLIGRAM(S): 2.5 TABLET ORAL at 06:59

## 2019-08-10 RX ADMIN — ERGOCALCIFEROL 50000 UNIT(S): 1.25 CAPSULE ORAL at 06:58

## 2019-08-10 RX ADMIN — CILOSTAZOL 50 MILLIGRAM(S): 100 TABLET ORAL at 06:59

## 2019-08-10 NOTE — PROGRESS NOTE ADULT - PROBLEM SELECTOR PLAN 1
Pancreatic  head mass found at OSH, transferred for EUS, to be performed on Monday.  -CT chset concerning for metastatic disease.  -plan to be determined with GI, surg-onc after EUS.

## 2019-08-10 NOTE — PROGRESS NOTE ADULT - ASSESSMENT
68f with 2 weeks of diarrhea and now with pain to the ruq with multiple stones rto the ruq. Pancreatic  head mass, plan for EUS

## 2019-08-10 NOTE — PROGRESS NOTE ADULT - SUBJECTIVE AND OBJECTIVE BOX
Transferred fro [facility]  Patient is a 68y old  Female who presents with a chief complaint of Pancreatic mass, Obstructive cholestatic liver disease, Urinary tract infection without Hematuria, Hypokalemia, PVD, Essential Hypertension. (09 Aug 2019 09:54)      SUBJECTIVE / OVERNIGHT EVENTS:  No complaints, tolerating diet. Aware to go for EUS on Monday.	    MEDICATIONS  (STANDING):  amLODIPine   Tablet 10 milliGRAM(s) Oral daily  cefTRIAXone   IVPB 1000 milliGRAM(s) IV Intermittent every 24 hours  cilostazol 50 milliGRAM(s) Oral two times a day  heparin  Injectable 5000 Unit(s) SubCutaneous every 12 hours  multivitamin 1 Tablet(s) Oral daily  sodium chloride 0.9%. 1000 milliLiter(s) (75 mL/Hr) IV Continuous <Continuous>    MEDICATIONS  (PRN):  ALBUTerol    90 MICROgram(s) HFA Inhaler 2 Puff(s) Inhalation every 6 hours PRN Shortness of Breath and/or Wheezing  morphine  - Injectable 4 milliGRAM(s) IV Push every 6 hours PRN Moderate Pain (4 - 6) or Severe Pain (7 - 10)  ondansetron Injectable 4 milliGRAM(s) IV Push every 6 hours PRN Nausea and/or Vomiting  Vital Signs Last 24 Hrs  T(C): 36.8 (10 Aug 2019 14:35), Max: 37.2 (09 Aug 2019 21:44)  T(F): 98.3 (10 Aug 2019 14:35), Max: 98.9 (09 Aug 2019 21:44)  HR: 72 (10 Aug 2019 14:35) (69 - 83)  BP: 119/75 (10 Aug 2019 14:35) (119/75 - 129/67)  BP(mean): --  RR: 18 (10 Aug 2019 14:35) (17 - 18)  SpO2: 98% (10 Aug 2019 14:35) (98% - 100%)  PHYSICAL EXAM:  GENERAL: NAD, well-developed  HEAD:  Atraumatic, Normocephalic  EYES: EOMI, PERRLA, conjunctiva and sclera clear  NECK: Supple, No JVD  CHEST/LUNG: Clear to auscultation bilaterally; No wheeze  HEART: Regular rate and rhythm; No murmurs, rubs, or gallops  ABDOMEN: Soft, Nontender, Nondistended; Bowel sounds present  EXTREMITIES:  2+ Peripheral Pulses, No clubbing, cyanosis, or edema  PSYCH: AAOx3  NEUROLOGY: non-focal  SKIN: No rashes or lesions    LABS:                                          10.3   4.22  )-----------( 304      ( 10 Aug 2019 06:00 )             30.8   08-10    136  |  101  |  4<L>  ----------------------------<  109<H>  3.6   |  24  |  0.50    Ca    8.7      10 Aug 2019 06:00  Phos  3.3     08-10  Mg     1.5     08-10    TPro  6.3  /  Alb  3.2<L>  /  TBili  5.4<H>  /  DBili  x   /  AST  93<H>  /  ALT  95<H>  /  AlkPhos  641<H>  08-10        RADIOLOGY & ADDITIONAL TESTS:    Imaging Personally Reviewed:  < from: CT Chest No Cont (08.10.19 @ 12:36) >    IMPRESSION:     Multiple bilateral lung nodules as described above. The largest in the   right lung measures 1.1 cm. The largest nodule in the left lobe measures   2.6 cm.    < end of copied text >      Consultant(s) Notes Reviewed:      Care Discussed with Consultants/Other Providers:

## 2019-08-11 LAB
ALBUMIN SERPL ELPH-MCNC: 3 G/DL — LOW (ref 3.3–5)
ALP SERPL-CCNC: 605 U/L — HIGH (ref 40–120)
ALT FLD-CCNC: 95 U/L — HIGH (ref 4–33)
ANION GAP SERPL CALC-SCNC: 10 MMO/L — SIGNIFICANT CHANGE UP (ref 7–14)
AST SERPL-CCNC: 88 U/L — HIGH (ref 4–32)
BILIRUB SERPL-MCNC: 5.2 MG/DL — HIGH (ref 0.2–1.2)
BUN SERPL-MCNC: 6 MG/DL — LOW (ref 7–23)
CALCIUM SERPL-MCNC: 8.9 MG/DL — SIGNIFICANT CHANGE UP (ref 8.4–10.5)
CHLORIDE SERPL-SCNC: 102 MMOL/L — SIGNIFICANT CHANGE UP (ref 98–107)
CO2 SERPL-SCNC: 26 MMOL/L — SIGNIFICANT CHANGE UP (ref 22–31)
CREAT SERPL-MCNC: 0.59 MG/DL — SIGNIFICANT CHANGE UP (ref 0.5–1.3)
GLUCOSE SERPL-MCNC: 90 MG/DL — SIGNIFICANT CHANGE UP (ref 70–99)
HCT VFR BLD CALC: 30.2 % — LOW (ref 34.5–45)
HGB BLD-MCNC: 10.1 G/DL — LOW (ref 11.5–15.5)
MAGNESIUM SERPL-MCNC: 1.7 MG/DL — SIGNIFICANT CHANGE UP (ref 1.6–2.6)
MCHC RBC-ENTMCNC: 27.1 PG — SIGNIFICANT CHANGE UP (ref 27–34)
MCHC RBC-ENTMCNC: 33.4 % — SIGNIFICANT CHANGE UP (ref 32–36)
MCV RBC AUTO: 81 FL — SIGNIFICANT CHANGE UP (ref 80–100)
NRBC # FLD: 0 K/UL — SIGNIFICANT CHANGE UP (ref 0–0)
PHOSPHATE SERPL-MCNC: 3.6 MG/DL — SIGNIFICANT CHANGE UP (ref 2.5–4.5)
PLATELET # BLD AUTO: 296 K/UL — SIGNIFICANT CHANGE UP (ref 150–400)
PMV BLD: 10.5 FL — SIGNIFICANT CHANGE UP (ref 7–13)
POTASSIUM SERPL-MCNC: 3.8 MMOL/L — SIGNIFICANT CHANGE UP (ref 3.5–5.3)
POTASSIUM SERPL-SCNC: 3.8 MMOL/L — SIGNIFICANT CHANGE UP (ref 3.5–5.3)
PROT SERPL-MCNC: 6 G/DL — SIGNIFICANT CHANGE UP (ref 6–8.3)
RBC # BLD: 3.73 M/UL — LOW (ref 3.8–5.2)
RBC # FLD: 17.2 % — HIGH (ref 10.3–14.5)
SODIUM SERPL-SCNC: 138 MMOL/L — SIGNIFICANT CHANGE UP (ref 135–145)
WBC # BLD: 4.53 K/UL — SIGNIFICANT CHANGE UP (ref 3.8–10.5)
WBC # FLD AUTO: 4.53 K/UL — SIGNIFICANT CHANGE UP (ref 3.8–10.5)

## 2019-08-11 PROCEDURE — 99232 SBSQ HOSP IP/OBS MODERATE 35: CPT | Mod: GC

## 2019-08-11 RX ORDER — SODIUM CHLORIDE 9 MG/ML
1000 INJECTION INTRAMUSCULAR; INTRAVENOUS; SUBCUTANEOUS
Refills: 0 | Status: DISCONTINUED | OUTPATIENT
Start: 2019-08-11 | End: 2019-08-12

## 2019-08-11 RX ADMIN — ONDANSETRON 4 MILLIGRAM(S): 8 TABLET, FILM COATED ORAL at 18:38

## 2019-08-11 RX ADMIN — Medication 1 TABLET(S): at 18:38

## 2019-08-11 RX ADMIN — HEPARIN SODIUM 5000 UNIT(S): 5000 INJECTION INTRAVENOUS; SUBCUTANEOUS at 06:41

## 2019-08-11 RX ADMIN — CILOSTAZOL 50 MILLIGRAM(S): 100 TABLET ORAL at 06:41

## 2019-08-11 RX ADMIN — MORPHINE SULFATE 4 MILLIGRAM(S): 50 CAPSULE, EXTENDED RELEASE ORAL at 19:26

## 2019-08-11 RX ADMIN — MORPHINE SULFATE 4 MILLIGRAM(S): 50 CAPSULE, EXTENDED RELEASE ORAL at 19:45

## 2019-08-11 RX ADMIN — AMLODIPINE BESYLATE 10 MILLIGRAM(S): 2.5 TABLET ORAL at 06:41

## 2019-08-11 NOTE — PROGRESS NOTE ADULT - SUBJECTIVE AND OBJECTIVE BOX
D Team Progress Note     S: feels comfortable, awaiting EUS/ERCP tomorrow, denies abdominal pain     Vital Signs Last 24 Hrs  T(C): 36.8 (11 Aug 2019 05:34), Max: 37.3 (10 Aug 2019 21:58)  T(F): 98.3 (11 Aug 2019 05:34), Max: 99.1 (10 Aug 2019 21:58)  HR: 78 (11 Aug 2019 05:34) (67 - 78)  BP: 115/65 (11 Aug 2019 05:34) (112/69 - 119/75)  BP(mean): --  RR: 17 (11 Aug 2019 05:34) (17 - 18)  SpO2: 97% (11 Aug 2019 05:34) (97% - 98%)    PHYSICAL EXAM:  General: No acute distress  Respiratory: Nonlabored  Abdominal: Soft, nondistended, nontender. No rebound or guarding.   Extremities: Warm    LABS:      CBC Full  -  ( 11 Aug 2019 06:45 )  WBC Count : 4.53 K/uL  RBC Count : 3.73 M/uL  Hemoglobin : 10.1 g/dL  Hematocrit : 30.2 %  Platelet Count - Automated : 296 K/uL  Mean Cell Volume : 81.0 fL  Mean Cell Hemoglobin : 27.1 pg  Mean Cell Hemoglobin Concentration : 33.4 %  Auto Neutrophil # : x  Auto Lymphocyte # : x  Auto Monocyte # : x  Auto Eosinophil # : x  Auto Basophil # : x  Auto Neutrophil % : x  Auto Lymphocyte % : x  Auto Monocyte % : x  Auto Eosinophil % : x  Auto Basophil % : x    08-11    138  |  102  |  6<L>  ----------------------------<  90  3.8   |  26  |  0.59    Ca    8.9      11 Aug 2019 06:45  Phos  3.6     08-11  Mg     1.7     08-11    TPro  6.0  /  Alb  3.0<L>  /  TBili  5.2<H>  /  DBili  x   /  AST  88<H>  /  ALT  95<H>  /  AlkPhos  605<H>  08-11    PT/INR - ( 10 Aug 2019 06:00 )   PT: 12.2 SEC;   INR: 1.10          PTT - ( 10 Aug 2019 06:00 )  PTT:30.8 SEC        RADIOLOGY & ADDITIONAL STUDIES (The following images were personally reviewed):  PROCEDURE:   CT of the Abdomen and Pelvis was performed with intravenous contrast.   Arterial and Portal Venous phases were acquired.  Intravenous contrast: 90 ml Omnipaque 350. 10 ml discarded.  Oral contrast: Water was administered.  Sagittal and coronal reformats were performed.    FINDINGS:    LOWER CHEST: There is an elongated nodular opacity in the right middle   lobe anteriorly measuring approximately 1.5 x 0.9 cm on image #2/series 3   which may represent pulmonary nodule versus mucoid impaction of airway.   Smaller nodular opacities are present adjacently as seen on image #1 and   image #3/series 3 in the right middle lobe. Nodular opacities are also   present in the lingula on image #14/series 3 and in the anterior left   lower lobe on image #19/series 3.    LIVER: There are scattered subcentimeter hypodensities in the liver, too   small to characterize.  BILE DUCTS: There is moderate to severe intra and extrahepatic biliary   ductal dilatation. The common bile duct measures up to 1.1 cm atits   midportion.  GALLBLADDER: Multiple stones are present within the gallbladder.  SPLEEN: Within normal limits.  PANCREAS: There is a ill-defined heterogeneous hypoenhancing mass in the   pancreatic head measuring approximately 3 x 3.3 cm axially on image   #42/series 3, measurement however is difficult as there is no    plane between this mass and the collapsed duodenum. There is marked   atrophy of the pancreatic body and tail with diffuse pancreatic ductal   dilatation measuring up to 7 mm.  ADRENALS: Within normal limits.  KIDNEYS/URETERS: No renal stones or hydronephrosis.    BLADDER: Within normal limits.  REPRODUCTIVE ORGANS: Hysterectomy.    BOWEL: No bowel obstruction. Appendix is within normal limits. There is   colonicdiverticulosis without evidence for diverticulitis.  PERITONEUM: No ascites.  VESSELS: There is normal caliber aorta and IVC. There is severe   atherosclerosis of the aorta and iliac arteries, with prominent amount of   intraluminal thrombus. The right common iliac and external iliac arteries   are thrombosed, likely chronic. A femorofemoral bypass graft is present   the patency of which cannot be certain. Clinical correlation is   recommended. The pancreatic mass abuts the superior mesenteric vein and   portal vein. The superior mesenteric artery is patent and appears intact.  RETROPERITONEUM/LYMPH NODES: No lymphadenopathy. Enlarged periportal   lymph nodes are present. An aortocaval lymph node on image #32/series 3   measures 2.6 x 1.2 cm.  ABDOMINAL WALL: Mild diffuse soft tissue anasarca.  BONES: Multilevel degenerative change of the thoracolumbar spine and   bilateral hips.    IMPRESSION:     Hypoenhancing 3 x 3.3 cm solid mass in the pancreatic head, concerning   for an adenocarcinoma. It is inseparable from the adjacent duodenum.   Details are as above.    Diffuse biliary ductal dilatation. Multiple large gallstones.    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 The patency of the femorofemoral graft cannot be determined on   this exam. Clinical correlation is recommended.      CT chest:     Multiple bilateral lung nodules as described above. The largest in the   right lung measures 1.1 cm. The largest nodule in the left lobe measures   2.6 cm.

## 2019-08-11 NOTE — DIETITIAN INITIAL EVALUATION ADULT. - DIET TYPE
PO diet Suggested d/c of DASH/TLC diet based on significant weight loss; Suggested Ensure Enlive 240mls 3x daily (1050kcal, 60g protein)./regular

## 2019-08-11 NOTE — DIETITIAN INITIAL EVALUATION ADULT. - PHYSICAL APPEARANCE
Nutrition focused physical exam conducted - found signs of malnutrition [ ]absent [X]present   Subcutaneous fat loss: [MILD] Orbital fat pads region, [MODERATE]Buccal fat region, [SEVERE]Triceps Muscle wasting: [MODERATE]Temples region, [SEVERE]Clavicle region/debilitated

## 2019-08-11 NOTE — DIETITIAN INITIAL EVALUATION ADULT. - OTHER INFO
RD visited with patient - admitted for biopsy of pancreatic mass; found with hypomagnesemia, UTI, obstructive cholestatic liver disease.  PMH significant for Iron deficiency anemia, smoking, claudication, HTN, PVD.  Patient reported appetite improved and nausea/diarrhea resolved over the past couple of days.  Stated avoided salt in her diet PTA & drank chocolate Boost 2-3x day.  Most recent weight PTA reported at 139 pounds at an outpatient MD appointment on 8/2/19. Reported overall weight loss from 230 pounds over unspecified timeframe. [employer] weight recorded on 4/15/19 176 pounds. Current weight 146.3 pounds / 66.4kg on admission 8/8/19.  Weight loss of 30 pounds x 4 months (17%). Denies any food allergies or intolerances.  Patient also denies any chewing or swallowing difficulties.  Spoke with [facility] provider and suggested initiating Ensure Enlive 240mls 3x daily (1050kcal, 60g protein).

## 2019-08-11 NOTE — DIETITIAN INITIAL EVALUATION ADULT. - PERTINENT LABORATORY DATA
08-11 Na138 mmol/L Glu 90 mg/dL K+ 3.8 mmol/L Cr  0.59 mg/dL BUN 6 mg/dL<L> 08-11 Phos 3.6 mg/dL 08-11 Alb 3.0 g/dL<L>

## 2019-08-11 NOTE — CHART NOTE - NSCHARTNOTEFT_GEN_A_CORE
NUTRITION SERVICES     Upon Nutritional Assessment by the Registered Dietitian your patient was determined to meet criteria/ has evidence of the following diagnosis/diagnoses:  [ ] Mild Protein Calorie Malnutrition   [ ] Moderate Protein Calorie Malnutrition   [X ] Severe Protein Calorie Malnutrition   [ ] Unspecified Protein Calorie Malnutrition   [ ] Underweight / BMI <19  [ ] Morbid Obesity / BMI >40    Findings as based on:  •  Comprehensive nutritional assessment and consultation    Please refer to Initial Dietitian Evaluation via documents section of [employer] EMR for further recommendations.    Xin Davis, MS, RDN, CDN

## 2019-08-11 NOTE — DIETITIAN INITIAL EVALUATION ADULT. - PERTINENT MEDS FT
MEDICATIONS  (STANDING):  amLODIPine   Tablet 10 milliGRAM(s) Oral daily  cilostazol 50 milliGRAM(s) Oral two times a day  ergocalciferol 13759 Unit(s) Oral <User Schedule>  heparin  Injectable 5000 Unit(s) SubCutaneous every 12 hours  multivitamin 1 Tablet(s) Oral daily  sodium chloride 0.9%. 1000 milliLiter(s) (75 mL/Hr) IV Continuous <Continuous>    MEDICATIONS  (PRN):  ALBUTerol    90 MICROgram(s) HFA Inhaler 2 Puff(s) Inhalation every 6 hours PRN Shortness of Breath and/or Wheezing  morphine  - Injectable 4 milliGRAM(s) IV Push every 6 hours PRN Moderate Pain (4 - 6) or Severe Pain (7 - 10)  ondansetron Injectable 4 milliGRAM(s) IV Push every 6 hours PRN Nausea and/or Vomiting

## 2019-08-11 NOTE — DIETITIAN INITIAL EVALUATION ADULT. - ADD RECOMMEND
1) Monitor weights, PO intake, skin integrity. 2) Honor food preferences to aid in optimizing nutritional intake. 3) Encourage hydration/fluid intake per medical recommendations.

## 2019-08-11 NOTE — PROGRESS NOTE ADULT - SUBJECTIVE AND OBJECTIVE BOX
Transferred fro [facility]  Patient is a 68y old  Female who presents with a chief complaint of Pancreatic mass, Obstructive cholestatic liver disease, Urinary tract infection without Hematuria, Hypokalemia, PVD, Essential Hypertension. (09 Aug 2019 09:54)      SUBJECTIVE / OVERNIGHT EVENTS:  No complaints, tolerating diet. Aware to go for EUS on Monday.	    MEDICATIONS  (STANDING):  amLODIPine   Tablet 10 milliGRAM(s) Oral daily  cefTRIAXone   IVPB 1000 milliGRAM(s) IV Intermittent every 24 hours  cilostazol 50 milliGRAM(s) Oral two times a day  heparin  Injectable 5000 Unit(s) SubCutaneous every 12 hours  multivitamin 1 Tablet(s) Oral daily  sodium chloride 0.9%. 1000 milliLiter(s) (75 mL/Hr) IV Continuous <Continuous>    MEDICATIONS  (PRN):  ALBUTerol    90 MICROgram(s) HFA Inhaler 2 Puff(s) Inhalation every 6 hours PRN Shortness of Breath and/or Wheezing  morphine  - Injectable 4 milliGRAM(s) IV Push every 6 hours PRN Moderate Pain (4 - 6) or Severe Pain (7 - 10)  ondansetron Injectable 4 milliGRAM(s) IV Push every 6 hours PRN Nausea and/or Vomiting  Vital Signs Last 24 Hrs  T(C): 36.8 (11 Aug 2019 05:34), Max: 37.3 (10 Aug 2019 21:58)  T(F): 98.3 (11 Aug 2019 05:34), Max: 99.1 (10 Aug 2019 21:58)  HR: 78 (11 Aug 2019 05:34) (67 - 78)  BP: 115/65 (11 Aug 2019 05:34) (112/69 - 119/75)  BP(mean): --  RR: 17 (11 Aug 2019 05:34) (17 - 18)  SpO2: 97% (11 Aug 2019 05:34) (97% - 98%)    PHYSICAL EXAM:  GENERAL: NAD, well-developed  HEAD:  Atraumatic, Normocephalic  EYES: EOMI, PERRLA, conjunctiva and sclera clear  NECK: Supple, No JVD  CHEST/LUNG: Clear to auscultation bilaterally; No wheeze  HEART: Regular rate and rhythm; No murmurs, rubs, or gallops  ABDOMEN: Soft, Nontender, Nondistended; Bowel sounds present  EXTREMITIES:  2+ Peripheral Pulses, No clubbing, cyanosis, or edema  PSYCH: AAOx3  NEUROLOGY: non-focal  SKIN: No rashes or lesions    LABS:                                                   10.1   4.53  )-----------( 296      ( 11 Aug 2019 06:45 )             30.2   08-11    138  |  102  |  6<L>  ----------------------------<  90  3.8   |  26  |  0.59    Ca    8.9      11 Aug 2019 06:45  Phos  3.6     08-11  Mg     1.7     08-11    TPro  6.0  /  Alb  3.0<L>  /  TBili  5.2<H>  /  DBili  x   /  AST  88<H>  /  ALT  95<H>  /  AlkPhos  605<H>  08-11    Imaging Personally Reviewed:  < from: CT Chest No Cont (08.10.19 @ 12:36) >    IMPRESSION:     Multiple bilateral lung nodules as described above. The largest in the   right lung measures 1.1 cm. The largest nodule in the left lobe measures   2.6 cm.    < end of copied text >      Consultant(s) Notes Reviewed:      Care Discussed with Consultants/Other Providers:

## 2019-08-11 NOTE — PROGRESS NOTE ADULT - ASSESSMENT
68F hx CAD, PVD s/p fem-fem bypass with graft, HTN, former smoker who presents with newly diagnosed pancreatic head mass    - GI planning for EUS/biopsy on Monday, f/u plan and recs  - Recommend oncology evaluation  - Further recommendations pending full work up  - Rest of care per primary, will follow    Surgical Oncology (D Team)  [phone] with questions

## 2019-08-11 NOTE — DIETITIAN INITIAL EVALUATION ADULT. - ENERGY NEEDS
Ht: 180.3cm Wt: 66.4kg 8/8/19 BMI: 20.4  IBW: 155 pounds +/- 10%  Edema: None noted  Pressure Injuries: None noted

## 2019-08-12 ENCOUNTER — RESULT REVIEW (OUTPATIENT)
Age: 69
End: 2019-08-12

## 2019-08-12 DIAGNOSIS — I10 ESSENTIAL (PRIMARY) HYPERTENSION: ICD-10-CM

## 2019-08-12 LAB
ALBUMIN SERPL ELPH-MCNC: 3 G/DL — LOW (ref 3.3–5)
ALP SERPL-CCNC: 535 U/L — HIGH (ref 40–120)
ALT FLD-CCNC: 83 U/L — HIGH (ref 4–33)
ANION GAP SERPL CALC-SCNC: 7 MMO/L — SIGNIFICANT CHANGE UP (ref 7–14)
APTT BLD: 30.6 SEC — SIGNIFICANT CHANGE UP (ref 27.5–36.3)
AST SERPL-CCNC: 77 U/L — HIGH (ref 4–32)
BILIRUB SERPL-MCNC: 5.2 MG/DL — HIGH (ref 0.2–1.2)
BUN SERPL-MCNC: 8 MG/DL — SIGNIFICANT CHANGE UP (ref 7–23)
CALCIUM SERPL-MCNC: 8.8 MG/DL — SIGNIFICANT CHANGE UP (ref 8.4–10.5)
CHLORIDE SERPL-SCNC: 102 MMOL/L — SIGNIFICANT CHANGE UP (ref 98–107)
CO2 SERPL-SCNC: 26 MMOL/L — SIGNIFICANT CHANGE UP (ref 22–31)
CREAT SERPL-MCNC: 0.67 MG/DL — SIGNIFICANT CHANGE UP (ref 0.5–1.3)
GLUCOSE SERPL-MCNC: 89 MG/DL — SIGNIFICANT CHANGE UP (ref 70–99)
HCT VFR BLD CALC: 28.4 % — LOW (ref 34.5–45)
HGB BLD-MCNC: 9.4 G/DL — LOW (ref 11.5–15.5)
INR BLD: 1.04 — SIGNIFICANT CHANGE UP (ref 0.88–1.17)
MAGNESIUM SERPL-MCNC: 1.7 MG/DL — SIGNIFICANT CHANGE UP (ref 1.6–2.6)
MCHC RBC-ENTMCNC: 27.4 PG — SIGNIFICANT CHANGE UP (ref 27–34)
MCHC RBC-ENTMCNC: 33.1 % — SIGNIFICANT CHANGE UP (ref 32–36)
MCV RBC AUTO: 82.8 FL — SIGNIFICANT CHANGE UP (ref 80–100)
NRBC # FLD: 0 K/UL — SIGNIFICANT CHANGE UP (ref 0–0)
PHOSPHATE SERPL-MCNC: 3.9 MG/DL — SIGNIFICANT CHANGE UP (ref 2.5–4.5)
PLATELET # BLD AUTO: 275 K/UL — SIGNIFICANT CHANGE UP (ref 150–400)
PMV BLD: 10.2 FL — SIGNIFICANT CHANGE UP (ref 7–13)
POTASSIUM SERPL-MCNC: 3.9 MMOL/L — SIGNIFICANT CHANGE UP (ref 3.5–5.3)
POTASSIUM SERPL-SCNC: 3.9 MMOL/L — SIGNIFICANT CHANGE UP (ref 3.5–5.3)
PROT SERPL-MCNC: 5.8 G/DL — LOW (ref 6–8.3)
PROTHROM AB SERPL-ACNC: 11.6 SEC — SIGNIFICANT CHANGE UP (ref 9.8–13.1)
RBC # BLD: 3.43 M/UL — LOW (ref 3.8–5.2)
RBC # FLD: 17.3 % — HIGH (ref 10.3–14.5)
SODIUM SERPL-SCNC: 135 MMOL/L — SIGNIFICANT CHANGE UP (ref 135–145)
WBC # BLD: 4.35 K/UL — SIGNIFICANT CHANGE UP (ref 3.8–10.5)
WBC # FLD AUTO: 4.35 K/UL — SIGNIFICANT CHANGE UP (ref 3.8–10.5)

## 2019-08-12 PROCEDURE — 43239 EGD BIOPSY SINGLE/MULTIPLE: CPT | Mod: 59,GC

## 2019-08-12 PROCEDURE — 99232 SBSQ HOSP IP/OBS MODERATE 35: CPT

## 2019-08-12 PROCEDURE — 88305 TISSUE EXAM BY PATHOLOGIST: CPT | Mod: 26

## 2019-08-12 PROCEDURE — 43242 EGD US FINE NEEDLE BX/ASPIR: CPT | Mod: GC

## 2019-08-12 PROCEDURE — 88307 TISSUE EXAM BY PATHOLOGIST: CPT | Mod: 26

## 2019-08-12 RX ORDER — OXYCODONE HYDROCHLORIDE 5 MG/1
5 TABLET ORAL EVERY 6 HOURS
Refills: 0 | Status: DISCONTINUED | OUTPATIENT
Start: 2019-08-12 | End: 2019-08-15

## 2019-08-12 RX ORDER — SODIUM CHLORIDE 9 MG/ML
1000 INJECTION, SOLUTION INTRAVENOUS
Refills: 0 | Status: DISCONTINUED | OUTPATIENT
Start: 2019-08-12 | End: 2019-08-12

## 2019-08-12 RX ORDER — HEPARIN SODIUM 5000 [USP'U]/ML
5000 INJECTION INTRAVENOUS; SUBCUTANEOUS EVERY 8 HOURS
Refills: 0 | Status: DISCONTINUED | OUTPATIENT
Start: 2019-08-12 | End: 2019-08-14

## 2019-08-12 RX ORDER — MORPHINE SULFATE 50 MG/1
4 CAPSULE, EXTENDED RELEASE ORAL EVERY 6 HOURS
Refills: 0 | Status: DISCONTINUED | OUTPATIENT
Start: 2019-08-12 | End: 2019-08-15

## 2019-08-12 RX ADMIN — AMLODIPINE BESYLATE 10 MILLIGRAM(S): 2.5 TABLET ORAL at 05:47

## 2019-08-12 RX ADMIN — SODIUM CHLORIDE 75 MILLILITER(S): 9 INJECTION INTRAMUSCULAR; INTRAVENOUS; SUBCUTANEOUS at 05:47

## 2019-08-12 RX ADMIN — Medication 1 TABLET(S): at 12:49

## 2019-08-12 RX ADMIN — MORPHINE SULFATE 4 MILLIGRAM(S): 50 CAPSULE, EXTENDED RELEASE ORAL at 14:21

## 2019-08-12 RX ADMIN — MORPHINE SULFATE 4 MILLIGRAM(S): 50 CAPSULE, EXTENDED RELEASE ORAL at 14:06

## 2019-08-12 RX ADMIN — HEPARIN SODIUM 5000 UNIT(S): 5000 INJECTION INTRAVENOUS; SUBCUTANEOUS at 21:37

## 2019-08-12 NOTE — PROGRESS NOTE ADULT - ASSESSMENT
68f with 2 weeks of diarrhea and now with pain to the ruq with multiple stones rto the ruq. Pancreatic  head mass, plan for EUS  < from: CT Chest No Cont (08.10.19 @ 12:36) >  IMPRESSION:     Multiple bilateral lung nodules as described above. The largest in the   right lung measures 1.1 cm. The largest nodule in the left lobe measures   2.6 cm.    Pancreatic  head mass found at OSH, transferred for EUS, to be performed on Monday.  -CT chset concerning for metastatic disease.  Abd pain sec to pancreatic lesion- controled 68f with 2 weeks of diarrhea and now with pain to the ruq with multiple stones rto the ruq. Pancreatic  head mass, plan for EUS  < from: CT Chest No Cont (08.10.19 @ 12:36) >  IMPRESSION:     Multiple bilateral lung nodules as described above. The largest in the   right lung measures 1.1 cm. The largest nodule in the left lobe measures   2.6 cm.    Pancreatic  head mass found at OSH, transferred for EUS, to be performed on Monday.  -CT chset concerning for metastatic disease.  Abd pain sec to pancreatic lesion- start oxy ir prn with v morphine for sever brakthrough pain  severe protein ashlee malnutrition- start ensure tid

## 2019-08-12 NOTE — PROGRESS NOTE ADULT - PROBLEM SELECTOR PLAN 2
likely in setting of panceratic mass, stable from yesterday likely in setting of pancreatic mass,   Eus today-0 f/u results of bxp

## 2019-08-12 NOTE — PROGRESS NOTE ADULT - SUBJECTIVE AND OBJECTIVE BOX
Patient is a 68y old  Female who presents with a chief complaint of Pancreatic mass, Obstructive cholestatic liver disease, Urinary tract infection without Hematuria, Hypokalemia, PVD, Essential Hypertension. (12 Aug 2019 07:49)      SUBJECTIVE / OVERNIGHT EVENTS:  Patient seen after EUS  had pain 8/10  noted epigastric pain is episodic    MEDICATIONS  (STANDING):  amLODIPine   Tablet 10 milliGRAM(s) Oral daily  ergocalciferol 22849 Unit(s) Oral <User Schedule>  multivitamin 1 Tablet(s) Oral daily    MEDICATIONS  (PRN):  ALBUTerol    90 MICROgram(s) HFA Inhaler 2 Puff(s) Inhalation every 6 hours PRN Shortness of Breath and/or Wheezing  morphine  - Injectable 4 milliGRAM(s) IV Push every 6 hours PRN Moderate Pain (4 - 6) or Severe Pain (7 - 10)  ondansetron Injectable 4 milliGRAM(s) IV Push every 6 hours PRN Nausea and/or Vomiting      Vital Signs Last 24 Hrs  T(C): 36.7 (12 Aug 2019 08:49), Max: 36.7 (12 Aug 2019 05:49)  T(F): 98.1 (12 Aug 2019 08:49), Max: 98.1 (12 Aug 2019 05:49)  HR: 63 (12 Aug 2019 08:49) (63 - 78)  BP: 140/82 (12 Aug 2019 08:49) (116/64 - 140/82)  BP(mean): --  RR: 18 (12 Aug 2019 08:49) (18 - 18)  SpO2: 100% (12 Aug 2019 08:49) (99% - 100%)      PHYSICAL EXAM:  GENERAL: NAD, well-developed  HEAD:  Atraumatic, Normocephalic  EYES: EOMI, PERRLA, conjunctiva and sclera - icteric  NECK: Supple, No JVD  CHEST/LUNG: Clear to auscultation bilaterally; No wheeze  HEART: Regular rate and rhythm; No murmurs, rubs, or gallops  ABDOMEN: Soft, Nontender, Nondistended; Bowel sounds present  EXTREMITIES:  2+ Peripheral Pulses, No clubbing, cyanosis, or edema  PSYCH: AAOx3  NEUROLOGY: non-focal  SKIN: jaundiced    LABS:                        9.4    4.35  )-----------( 275      ( 12 Aug 2019 05:25 )             28.4     08-12    135  |  102  |  8   ----------------------------<  89  3.9   |  26  |  0.67    Ca    8.8      12 Aug 2019 05:25  Phos  3.9     08-12  Mg     1.7     08-12    TPro  5.8<L>  /  Alb  3.0<L>  /  TBili  5.2<H>  /  DBili  x   /  AST  77<H>  /  ALT  83<H>  /  AlkPhos  535<H>  08-12    PT/INR - ( 12 Aug 2019 05:25 )   PT: 11.6 SEC;   INR: 1.04          PTT - ( 12 Aug 2019 05:25 )  PTT:30.6 SEC            Care Discussed with Consultants/Other Providers:  Gi - did Eus

## 2019-08-13 LAB
ALBUMIN SERPL ELPH-MCNC: 2.8 G/DL — LOW (ref 3.3–5)
ALP SERPL-CCNC: 609 U/L — HIGH (ref 40–120)
ALT FLD-CCNC: 81 U/L — HIGH (ref 4–33)
ANION GAP SERPL CALC-SCNC: 11 MMO/L — SIGNIFICANT CHANGE UP (ref 7–14)
AST SERPL-CCNC: 78 U/L — HIGH (ref 4–32)
BILIRUB DIRECT SERPL-MCNC: 5 MG/DL — HIGH (ref 0.1–0.2)
BILIRUB SERPL-MCNC: 5.9 MG/DL — HIGH (ref 0.2–1.2)
BUN SERPL-MCNC: 8 MG/DL — SIGNIFICANT CHANGE UP (ref 7–23)
CALCIUM SERPL-MCNC: 8.6 MG/DL — SIGNIFICANT CHANGE UP (ref 8.4–10.5)
CHLORIDE SERPL-SCNC: 101 MMOL/L — SIGNIFICANT CHANGE UP (ref 98–107)
CO2 SERPL-SCNC: 24 MMOL/L — SIGNIFICANT CHANGE UP (ref 22–31)
CREAT SERPL-MCNC: 0.53 MG/DL — SIGNIFICANT CHANGE UP (ref 0.5–1.3)
GLUCOSE SERPL-MCNC: 91 MG/DL — SIGNIFICANT CHANGE UP (ref 70–99)
HCT VFR BLD CALC: 30.7 % — LOW (ref 34.5–45)
HGB BLD-MCNC: 10.2 G/DL — LOW (ref 11.5–15.5)
MCHC RBC-ENTMCNC: 27.1 PG — SIGNIFICANT CHANGE UP (ref 27–34)
MCHC RBC-ENTMCNC: 33.2 % — SIGNIFICANT CHANGE UP (ref 32–36)
MCV RBC AUTO: 81.4 FL — SIGNIFICANT CHANGE UP (ref 80–100)
NRBC # FLD: 0.03 K/UL — SIGNIFICANT CHANGE UP (ref 0–0)
PLATELET # BLD AUTO: 289 K/UL — SIGNIFICANT CHANGE UP (ref 150–400)
PMV BLD: 10.7 FL — SIGNIFICANT CHANGE UP (ref 7–13)
POTASSIUM SERPL-MCNC: 3.7 MMOL/L — SIGNIFICANT CHANGE UP (ref 3.5–5.3)
POTASSIUM SERPL-SCNC: 3.7 MMOL/L — SIGNIFICANT CHANGE UP (ref 3.5–5.3)
PROT SERPL-MCNC: 6 G/DL — SIGNIFICANT CHANGE UP (ref 6–8.3)
RBC # BLD: 3.77 M/UL — LOW (ref 3.8–5.2)
RBC # FLD: 17.4 % — HIGH (ref 10.3–14.5)
SODIUM SERPL-SCNC: 136 MMOL/L — SIGNIFICANT CHANGE UP (ref 135–145)
WBC # BLD: 4.69 K/UL — SIGNIFICANT CHANGE UP (ref 3.8–10.5)
WBC # FLD AUTO: 4.69 K/UL — SIGNIFICANT CHANGE UP (ref 3.8–10.5)

## 2019-08-13 PROCEDURE — 93010 ELECTROCARDIOGRAM REPORT: CPT

## 2019-08-13 PROCEDURE — 99233 SBSQ HOSP IP/OBS HIGH 50: CPT

## 2019-08-13 PROCEDURE — 99232 SBSQ HOSP IP/OBS MODERATE 35: CPT | Mod: GC

## 2019-08-13 RX ORDER — POLYETHYLENE GLYCOL 3350 17 G/17G
17 POWDER, FOR SOLUTION ORAL DAILY
Refills: 0 | Status: DISCONTINUED | OUTPATIENT
Start: 2019-08-13 | End: 2019-08-23

## 2019-08-13 RX ADMIN — OXYCODONE HYDROCHLORIDE 5 MILLIGRAM(S): 5 TABLET ORAL at 22:33

## 2019-08-13 RX ADMIN — POLYETHYLENE GLYCOL 3350 17 GRAM(S): 17 POWDER, FOR SOLUTION ORAL at 18:14

## 2019-08-13 RX ADMIN — Medication 1 TABLET(S): at 15:05

## 2019-08-13 RX ADMIN — AMLODIPINE BESYLATE 10 MILLIGRAM(S): 2.5 TABLET ORAL at 07:17

## 2019-08-13 RX ADMIN — HEPARIN SODIUM 5000 UNIT(S): 5000 INJECTION INTRAVENOUS; SUBCUTANEOUS at 15:05

## 2019-08-13 RX ADMIN — HEPARIN SODIUM 5000 UNIT(S): 5000 INJECTION INTRAVENOUS; SUBCUTANEOUS at 07:17

## 2019-08-13 RX ADMIN — HEPARIN SODIUM 5000 UNIT(S): 5000 INJECTION INTRAVENOUS; SUBCUTANEOUS at 21:23

## 2019-08-13 RX ADMIN — OXYCODONE HYDROCHLORIDE 5 MILLIGRAM(S): 5 TABLET ORAL at 21:33

## 2019-08-13 NOTE — PROGRESS NOTE ADULT - ASSESSMENT
Impression:  68 year old female, with past history significant for HTN, Anemia, Smoking, PVD, Claudication of R-LE, and RLE stent placement, who presents for evaluation of pancreatic mass from [facility].    #pancreatic mass with obstructive jaundice - s/p EUS with biopsy; no stent placement as patient is asymptomatic from elevated TB  # PVD with RLE stent  #UTI on abx      Recs:  - followup biopsies from EUS/FNA  - surg onc and med onc consultation  - trend CBC, CMP, INR  - advance diet as tolerated

## 2019-08-13 NOTE — PROGRESS NOTE ADULT - ASSESSMENT
68f with 2 weeks of diarrhea and now with pain to the ruq with multiple stones rto the ruq. Pancreatic  head mass, plan for EUS  < from: CT Chest No Cont (08.10.19 @ 12:36) >  IMPRESSION:     Multiple bilateral lung nodules as described above. The largest in the   right lung measures 1.1 cm. The largest nodule in the left lobe measures   2.6 cm.    Pancreatic  head mass found at OSH, transferred for EUS, to be performed on Monday.  -CT chest concerning for metastatic disease.  Abd pain sec to pancreatic lesion- start oxy ir prn with v morphine for sever brakthrough pain  severe protein ashlee malnutrition- start ensure tid 68f with 2 weeks of diarrhea and now with pain to the ruq with multiple stones rto the ruq. Pancreatic  head mass, plan for EUS  < from: CT Chest No Cont (08.10.19 @ 12:36) >  IMPRESSION:     Multiple bilateral lung nodules as described above. The largest in the   right lung measures 1.1 cm. The largest nodule in the left lobe measures   2.6 cm.    Pancreatic  head mass found at OSH, transferred for EUS, to be performed on Monday.  -CT chest concerning for metastatic disease.  Abd pain sec to pancreatic lesion- start oxy ir prn with v morphine for sever brakthrough pain  severe protein ashlee malnutrition- start ensure tid  EUS 8/12/19  Impression:          - Normal esophagus.                       - Erythematous mucosa in the antrum.                       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 Biopsied.                       - A few enlarged lymph nodes were visualized in the                        peripancreatic region.                       - A mass was identified in the pancreatic head. Fine                        needle biopsy performed.                       - There was dilation in the common bile duct which                        measured up to 15 mm.                       - The pancreatic duct had a dilated endosonographic                        appearance in the main pancreatic duct. The pancreatic                        duct measured up to 9 mm in diameter.  Recommendation:      - Return patient to [facility] for ongoing care.                       - Await path results.                       - Resume regular diet.

## 2019-08-13 NOTE — PROGRESS NOTE ADULT - SUBJECTIVE AND OBJECTIVE BOX
Chief Complaint:  Patient is a 68y old  Female who presents with a chief complaint of Pancreatic mass, Obstructive cholestatic liver disease, Urinary tract infection without Hematuria, Hypokalemia, PVD, Essential Hypertension. (12 Aug 2019 07:49)      Interval Events: No adverse events overnight, s/p EUS with bx of panc mass     Allergies:  No Known Allergies      Hospital Medications:  ALBUTerol    90 MICROgram(s) HFA Inhaler 2 Puff(s) Inhalation every 6 hours PRN  amLODIPine   Tablet 10 milliGRAM(s) Oral daily  ergocalciferol 93779 Unit(s) Oral <User Schedule>  heparin  Injectable 5000 Unit(s) SubCutaneous every 8 hours  morphine  - Injectable 4 milliGRAM(s) IV Push every 6 hours PRN  multivitamin 1 Tablet(s) Oral daily  ondansetron Injectable 4 milliGRAM(s) IV Push every 6 hours PRN  oxyCODONE    IR 5 milliGRAM(s) Oral every 6 hours PRN      PMHX/PSHX:  Iron deficiency anemia  Claudication  Anemia  Smoking history  HTN (hypertension)  History of colonoscopy  History of intravascular stent placement  History of  section      Family history:  Family history of cancer  Family history of hypertension      ROS:     General:  No wt loss, fevers, chills, night sweats, fatigue,   Eyes:  Good vision, no reported pain  ENT:  No sore throat, pain, runny nose, dysphagia  CV:  No pain, palpitations, hypo/hypertension  Resp:  No dyspnea, cough, tachypnea, wheezing  GI:  See HPI  :  No pain, bleeding, incontinence, nocturia  Muscle:  No pain, weakness  Neuro:  No weakness, tingling, memory problems  Psych:  No fatigue, insomnia, mood problems, depression  Endocrine:  No polyuria, polydipsia, cold/heat intolerance  Heme:  No petechiae, ecchymosis, easy bruisability  Skin:  No rash, edema      PHYSICAL EXAM:     GENERAL:  Appears stated age, well-groomed, well-nourished, no distress  HEENT:  NC/AT,  conjunctivae clear, sclera -anicteric  CHEST:  Full & symmetric excursion, no increased effort, breath sounds clear  HEART:  Regular rhythm, S1, S2, no murmur/rub/S3/S4,  no edema  ABDOMEN:  Soft, non-tender, non-distended, normoactive bowel sounds,  no masses ,no hepato-splenomegaly,   EXTREMITIES:  no cyanosis,clubbing or edema  SKIN:  No rash/erythema/ecchymoses/petechiae/wounds/abscess/warm/dry  NEURO:  Alert, oriented    Vital Signs:  Vital Signs Last 24 Hrs  T(C): 36.9 (12 Aug 2019 21:26), Max: 36.9 (12 Aug 2019 15:19)  T(F): 98.4 (12 Aug 2019 21:26), Max: 98.4 (12 Aug 2019 15:19)  HR: 53 (12 Aug 2019 21:) (53 - 90)  BP: 141/79 (12 Aug 2019 21:) (101/66 - 141/79)  BP(mean): --  RR: 18 (12 Aug 2019 21:) (18 - 18)  SpO2: 97% (12 Aug 2019 21:) (97% - 100%)  Daily Height in cm: 180.3 (12 Aug 2019 08:49)    Daily     LABS:                        9.4    4.35  )-----------( 275      ( 12 Aug 2019 05:25 )             28.4         135  |  102  |  8   ----------------------------<  89  3.9   |  26  |  0.67    Ca    8.8      12 Aug 2019 05:25  Phos  3.9       Mg     1.7         TPro  5.8<L>  /  Alb  3.0<L>  /  TBili  5.2<H>  /  DBili  x   /  AST  77<H>  /  ALT  83<H>  /  AlkPhos  535<H>      LIVER FUNCTIONS - ( 12 Aug 2019 05:25 )  Alb: 3.0 g/dL / Pro: 5.8 g/dL / ALK PHOS: 535 u/L / ALT: 83 u/L / AST: 77 u/L / GGT: x           PT/INR - ( 12 Aug 2019 05:25 )   PT: 11.6 SEC;   INR: 1.04          PTT - ( 12 Aug 2019 05:25 )  PTT:30.6 SEC        Imaging:  < from: Upper EUS (19 @ 09:26) >    [facility]  _______________________________________________________________________________  Patient Name: Jenn Garcia             Procedure Date: 2019 9:26 AM  MRN: [id number]                     Account Number: [id number]  YOB: 1950             Admit Type: Inpatient  Room: [place] [address]                         Gender: Female  Attending MD: Farhan Pearson MD       _______________________________________________________________________________     Procedure:           Upper EUS  Indications:         Pancreatic tumor on Computed Tomogram Scan  Providers:           Farhan Pearson MD, JAMISON THAO MD (Fellow)  Referring MD:        TALIB STINSON MD  Medicines:           Monitored Anesthesia Care  Complications:       No immediate complications. Estimated blood loss:                        Minimal.  Procedure:           Pre-Anesthesia Assessment:                       - Universal Protocol:                       - Pre-procedure Verification: Prior to the procedure,                        the patient's identity was verified by full name, date                        of birth and medical record number. The patient's                        identity was verified on all pertinent medical records,                        including History and Physical, nursing assessment and                        pre-anesthesia assessment. Also prior to the procedure,                        a History and Physical was performed, and patient      medications, allergies and sensitivities were reviewed.                        The patient's tolerance of previous anesthesia was                        reviewed. The risks and benefits of the procedure and                        the sedation options and risks were discussed with the                        patient. All questions were answered and informed                        consent was obtained.                       - Marking: The endoscopic procedure was visually marked        on a patient wrist band delineating the patient name,                        proposed procedure and endoscopist's initials.                       - Time-Out: Prior to the start of the procedure, the                        patient's identification, proposed procedure, accurate                        signed consent, correctly labeled images and records,                        and need for prophylactic antibiotics were verified by                        the physician, the nurse, the anesthesiologist and the                        anesthetist in the pre-procedure area in the procedure                        room.                       After obtaining informed consent, the endoscope was                        passed under direct vision. Throughout the procedure,                        the patient's blood pressure, pulse, and oxygen                        saturations were monitored continuously. The ERCP was                        introduced through the mouth, and advanced to the                duodenum second part of duodenum. The ERCP was                        accomplished without difficulty. The patient tolerated                        the procedure well. After obtaining informed consent,                        the endoscope was passed under direct vision. Throughout                        the procedure, the patient's blood pressure, pulse, and                        oxygen saturations were monitored continuously.                           Findings:       Endoscopic Finding :       The examined esophagus was normal.       Patchy mildly erythematous mucosa without bleeding was found in the        gastric antrum.       The exam of the stomach was otherwise normal.       A large sessile/edematous mass with no bleeding was found in the second        part of the duodenum. Biopsies were taken with a cold forceps for        histology.       The exam of the duodenum was otherwise normal.       Endosonographic Finding :       A few enlarged lymph nodes were visualized in the peripancreatic region.        The nodes were round, hypoechoic and had well defined margins.       A round mass was identified in the pancreatic head. The mass was        heterogenous. The mass measured 3.8 cm by 3.4 cm in maximal        cross-sectional diameter. The endosonographic borders were        poorly-defined. Fine needle biopsy was performed. Color Doppler imaging        was utilized prior to needle puncture to confirm a lack ofsignificant        vascular structures within the needle path. Four passes were made with        the 22 gauge ultrasound biopsy needle using a transduodenal approach. A        good visible core of tissue was obtained.       There was dilation in the common bile duct which measured up to 15 mm.       The pancreatic duct had a dilated endosonographic appearance in the main        pancreatic duct. The pancreatic duct measured up to 9 mm in diameter.                                 Impression:          - Normal esophagus.                       - Erythematous mucosa in the antrum.                       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 Biopsied.                       - A few enlarged lymph nodes were visualized in the                        peripancreatic region.                       - A mass was identified in the pancreatic head. Fine                        needle biopsy performed.                       - There was dilation in the common bileduct which                        measured up to 15 mm.                       - The pancreatic duct had a dilated endosonographic                        appearance in the main pancreatic duct. The pancreatic                        duct measured up to9 mm in diameter.  Recommendation:      - Return patient to [facility] for ongoing care.                       - Await path results.                       - Resume regular diet.              Attending Participation:       I was present and participated during the entire procedure, including        non-key portions.                                                                                     __________________  Farhan Pearson MD  2019 11:49:57 AM  This report has been signed electronically.  Number of Addenda: 0    Note Initiated On: 2019 9:26 AM    < end of copied text >

## 2019-08-13 NOTE — CONSULT NOTE ADULT - SUBJECTIVE AND OBJECTIVE BOX
HPI:  Patient is a 68 year old female known to our office (Cardiologist - Dr. Cash) with PMHx of HTN, former smoker, anemia, COPD (with inhaler use per patient), PVD s/p RLE bypass in 2018 who had an echo with normal LV/RV function and a normal nuclear stress test in our office last year as preop for LE bypass now transferred from [facility] for EUS/biopsy of pancreatic mass. Cardiology consulted for preop clearance for possible surgery. Patient reports having crampy abdominal pain the last 2 weeks as well as diarrhea for 2 months prompting her to have blood work done as outpatient resulting in abnormal liver function tests. Pt also reports decreased appetite recently. Reports chronic back pain. Patient reports being able to walk 1 mile before becoming SOB with mild wheeze that is related to her underlying COPD which resolves with inhaler use per patient. Denies any significant KAUFMAN. Denies SOB at rest, chest pain, dizziness, palpitations, syncope, orthopnea, n/v, fever/chills.      PAST MEDICAL & SURGICAL HISTORY:  Iron deficiency anemia  Claudication: ~ chiefly of RLE  Smoking history: ~ quit ~ 2017  HTN (hypertension)  History of colonoscopy: ~   History of intravascular stent placement: ~ RLE ([facility] ~ 2018)  History of  section      MEDICATIONS  (STANDING):  amLODIPine   Tablet 10 milliGRAM(s) Oral daily  ergocalciferol 34946 Unit(s) Oral <User Schedule>  heparin  Injectable 5000 Unit(s) SubCutaneous every 8 hours  multivitamin 1 Tablet(s) Oral daily      Allergies    No Known Allergies    Intolerances    FAMILY HISTORY:  Family history of cancer: ~ mother (unknown type)  Family history of hypertension: ~ mother    Noncontributory for premature coronary disease or sudden cardiac death    SOCIAL HISTORY:    [ ] Non-smoker  [x ] Former Smoker  [ ] Alcohol      REVIEW OF SYSTEMS:  [ ]chest pain  [  ]shortness of breath  [  ]palpitations  [  ]syncope  [ ]near syncope [ ]upper extremity weakness   [ ] lower extremity weakness  [  ]diplopia  [  ]altered mental status   [  ]fevers  [ ]chills [ ]nausea  [ ]vomitting  [  ]dysphagia    [x ]abdominal pain  [ ]melena  [ ]BRBPR    [  ]epistaxis  [  ]rash  [ ]lower extremity edema    +diarrhea  +back pain    [x ] All others negative	  [ ] Unable to obtain    PHYSICAL EXAM:  T(C): 36.9 (19 @ 13:05), Max: 37 (19 @ 07:15)  HR: 68 (19 @ 13:05) (53 - 78)  BP: 113/55 (19 @ 13:05) (113/55 - 141/79)  RR: 18 (19 @ 07:15) (18 - 18)  SpO2: 96% (19 @ 13:05) (96% - 97%)  Wt(kg): --    Appearance: Normal	  HEENT:   Normal oral mucosa, PERRL, EOMI	  Lymphatic: No lymphadenopathy , no edema  Cardiovascular: Normal S1 S2, No JVD, No murmurs , Peripheral pulses palpable 2+ bilaterally  Respiratory: Lungs clear to auscultation, normal effort 	  Gastrointestinal:  Soft, Mild diffuse tenderness, + BS	  Skin: No rashes, No ecchymoses, No cyanosis, warm to touch  Musculoskeletal: Normal range of motion, normal strength  Psychiatry:  Mood & affect appropriate    DIAGNOSTIC DATA:    TELEMETRY: None	      ECG: Pending	    Echo: Done as outpatient on 18 (full result placed in physical chart)  IMPRESSION: Normal LV size and systolic function. Estimated LVEF 60%. LV wall motion abnormality - hypokinesis of basal and inferoseptal segments as well as entire inferior wall. Remaining segments with normal wall motion. Grade 1 diastolic dysfunction. Normal RV size and function.    NST: Done in our office on 18 (full result placed in physical chart)     CONCLUSIONS: Normal Study .   Normal myocardial perfusion SPECT images.   Normal left ventricular size and function. Calculated EF is 63%.     Cath: No previous cath done  	  LABS:	 	                            10.2   4.69  )-----------( 289      ( 13 Aug 2019 06:05 )             30.7     08-13    136  |  101  |  8   ----------------------------<  91  3.7   |  24  |  0.53    Ca    8.6      13 Aug 2019 06:05  Phos  3.9     08-12  Mg     1.7     08-12    TPro  6.0  /  Alb  2.8<L>  /  TBili  5.9<H>  /  DBili  5.0<H>  /  AST  78<H>  /  ALT  81<H>  /  AlkPhos  609<H>  08-    proBNP:   Lipid Profile:   HgA1c:   TSH:     ASSESSMENT/PLAN: Patient is a 68 year old female known to our office (Cardiologist - Dr. Cash) with PMHx of HTN, former smoker, anemia, COPD (with inhaler use per patient), PVD s/p RLE bypass in 2018 who had an echo with normal LV/RV function and a normal nuclear stress test in our office last year as preop for LE bypass now transferred from [facility] for EUS/biopsy of pancreatic mass. Cardiology consulted for preop clearance for possible surgery.    - No evidence of clinical HF or anginal symptoms  - DVT prophylaxis (on Heparin SQ)  - Previous outpatient echo with normal LVEF and normal nuclear stress test noted above from last year  - Check baseline 12 lead ECG  - Repeat echo to reeval LV function  - Cardiac clearance pending above    Dewayne Barrientos PA-C  [facility]  Pager: [phone] HPI:  Patient is a 68 year old female known to our office (Cardiologist - Dr. Cash) with PMHx of HTN, former smoker, anemia, COPD (with inhaler use per patient), PVD s/p RLE bypass in 2018 who had an echo with normal LV/RV function and a normal nuclear stress test in our office last year as preop for LE bypass now transferred from [facility] for EUS/biopsy of pancreatic mass. Cardiology consulted for preop clearance for possible surgery. Patient reports having crampy abdominal pain the last 2 weeks as well as diarrhea for 2 months prompting her to have blood work done as outpatient resulting in abnormal liver function tests. Pt also reports decreased appetite recently. Reports chronic back pain. Patient reports being able to walk 1 mile before becoming SOB with mild wheeze that is related to her underlying COPD which resolves with inhaler use per patient. Denies any significant KAUFMAN. Denies SOB at rest, chest pain, dizziness, palpitations, syncope, orthopnea, n/v, fever/chills.      PAST MEDICAL & SURGICAL HISTORY:  Iron deficiency anemia  Claudication: ~ chiefly of RLE  Smoking history: ~ quit ~ 2017  HTN (hypertension)  History of colonoscopy: ~   History of intravascular stent placement: ~ RLE ([facility] ~ 2018)  History of  section      MEDICATIONS  (STANDING):  amLODIPine   Tablet 10 milliGRAM(s) Oral daily  ergocalciferol 88498 Unit(s) Oral <User Schedule>  heparin  Injectable 5000 Unit(s) SubCutaneous every 8 hours  multivitamin 1 Tablet(s) Oral daily      Allergies    No Known Allergies    Intolerances    FAMILY HISTORY:  Family history of cancer: ~ mother (unknown type)  Family history of hypertension: ~ mother    Noncontributory for premature coronary disease or sudden cardiac death    SOCIAL HISTORY:    [ ] Non-smoker  [x ] Former Smoker  [ ] Alcohol      REVIEW OF SYSTEMS:  [ ]chest pain  [  ]shortness of breath  [  ]palpitations  [  ]syncope  [ ]near syncope [ ]upper extremity weakness   [ ] lower extremity weakness  [  ]diplopia  [  ]altered mental status   [  ]fevers  [ ]chills [ ]nausea  [ ]vomitting  [  ]dysphagia    [x ]abdominal pain  [ ]melena  [ ]BRBPR    [  ]epistaxis  [  ]rash  [ ]lower extremity edema    +diarrhea  +back pain    [x ] All others negative	  [ ] Unable to obtain    PHYSICAL EXAM:  T(C): 36.9 (19 @ 13:05), Max: 37 (19 @ 07:15)  HR: 68 (19 @ 13:05) (53 - 78)  BP: 113/55 (19 @ 13:05) (113/55 - 141/79)  RR: 18 (19 @ 07:15) (18 - 18)  SpO2: 96% (19 @ 13:05) (96% - 97%)  Wt(kg): --    Appearance: Normal	  HEENT:   Normal oral mucosa, PERRL, EOMI	  Lymphatic: No lymphadenopathy , no edema  Cardiovascular: Normal S1 S2, No JVD, No murmurs , Peripheral pulses palpable 2+ bilaterally  Respiratory: Lungs clear to auscultation, normal effort 	  Gastrointestinal:  Soft, Mild diffuse tenderness, + BS	  Skin: No rashes, No ecchymoses, No cyanosis, warm to touch  Musculoskeletal: Normal range of motion, normal strength  Psychiatry:  Mood & affect appropriate    DIAGNOSTIC DATA:    TELEMETRY: None	      ECG: Pending	    Echo: Done as outpatient on 18 (full result placed in physical chart)  IMPRESSION: Normal LV size and systolic function. Estimated LVEF 60%. LV wall motion abnormality - hypokinesis of basal and inferoseptal segments as well as entire inferior wall. Remaining segments with normal wall motion. Grade 1 diastolic dysfunction. Normal RV size and function.    NST: Done in our office on 18 (full result placed in physical chart)     CONCLUSIONS: Normal Study .   Normal myocardial perfusion SPECT images.   Normal left ventricular size and function. Calculated EF is 63%.     Cath: No previous cath done  	  LABS:	 	                            10.2   4.69  )-----------( 289      ( 13 Aug 2019 06:05 )             30.7     08-13    136  |  101  |  8   ----------------------------<  91  3.7   |  24  |  0.53    Ca    8.6      13 Aug 2019 06:05  Phos  3.9     08-12  Mg     1.7     08-12    TPro  6.0  /  Alb  2.8<L>  /  TBili  5.9<H>  /  DBili  5.0<H>  /  AST  78<H>  /  ALT  81<H>  /  AlkPhos  609<H>  -    proBNP:   Lipid Profile:   HgA1c:   TSH:     ASSESSMENT/PLAN: Patient is a 68 year old female known to our office (Cardiologist - Dr. Cash) with PMHx of HTN, former smoker, anemia, COPD (with inhaler use per patient), PVD s/p RLE bypass in 2018 who had an echo with normal LV/RV function and a normal nuclear stress test in our office last year as preop for LE bypass now transferred from [facility] for EUS/biopsy of pancreatic mass. Cardiology consulted for preop clearance for possible surgery.    - No evidence of clinical HF or anginal symptoms  - DVT prophylaxis (on Heparin SQ)  - BP overall controlled on norvasc  - Previous outpatient echo with normal LVEF and normal nuclear stress test noted above from last year  - Check baseline 12 lead ECG  - Repeat echo to reeval LV function  - GI/Oncology f/u  - Cardiac clearance pending above    Dewayne Barrientos PA-C  [facility]  Pager: [phone]

## 2019-08-13 NOTE — PROGRESS NOTE ADULT - ATTENDING COMMENTS
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                       - A few enlarged lymph nodes were visualized in the                        peripancreatic region.                       - A mass was identified in the pancreatic head. Fine                        needle biopsy performed.                       - There was dilation in the common bile duct which                        measured up to 15 mm.                       - The pancreatic duct had a dilated endosonographic                        appearance in the main pancreatic duct. The pancreatic                        duct measured up to 9 mm in diameter.  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                       - A few enlarged lymph nodes were visualized in the                        peripancreatic region.                       - A mass was identified in the pancreatic head. Fine                        needle biopsy performed.                       - There was dilation in the common bile duct which                        measured up to 15 mm.                       - The pancreatic duct had a dilated endosonographic                        appearance in the main pancreatic duct. The pancreatic                        duct measured up to 9 mm in diameter.  Surg-Onc called for consult    Addendum  will start hep drip w/o bolus and keep ptt at 60 for Afib - await gyn-onc consult

## 2019-08-13 NOTE — CONSULT NOTE ADULT - ATTENDING COMMENTS
Agree with above.   check tte to evaluate lv function  follow up surgery  further workup pending above    Maximiliano Sarmiento MD

## 2019-08-13 NOTE — PROGRESS NOTE ADULT - ATTENDING COMMENTS
Patient seen and examined with the GI fellow. I agree with the above assessment and plan. Thank you for allowing us to care for your patient.    Pt underwent EUS yesterday with DR Pearson. Follow up pathology.

## 2019-08-13 NOTE — PROGRESS NOTE ADULT - SUBJECTIVE AND OBJECTIVE BOX
Patient is a 68y old  Female who presents with a chief complaint of Pancreatic mass, Obstructive cholestatic liver disease, Urinary tract infection without Hematuria, Hypokalemia, PVD, Essential Hypertension. (13 Aug 2019 06:53)      SUBJECTIVE / OVERNIGHT EVENTS:    MEDICATIONS  (STANDING):  amLODIPine   Tablet 10 milliGRAM(s) Oral daily  ergocalciferol 77397 Unit(s) Oral <User Schedule>  heparin  Injectable 5000 Unit(s) SubCutaneous every 8 hours  multivitamin 1 Tablet(s) Oral daily    MEDICATIONS  (PRN):  ALBUTerol    90 MICROgram(s) HFA Inhaler 2 Puff(s) Inhalation every 6 hours PRN Shortness of Breath and/or Wheezing  morphine  - Injectable 4 milliGRAM(s) IV Push every 6 hours PRN breakthroug severe pain  ondansetron Injectable 4 milliGRAM(s) IV Push every 6 hours PRN Nausea and/or Vomiting  oxyCODONE    IR 5 milliGRAM(s) Oral every 6 hours PRN Moderate Pain (4 - 6) and sever pain      Vital Signs Last 24 Hrs  T(C): 37 (13 Aug 2019 07:15), Max: 37 (13 Aug 2019 07:15)  T(F): 98.6 (13 Aug 2019 07:15), Max: 98.6 (13 Aug 2019 07:15)  HR: 78 (13 Aug 2019 07:15) (53 - 90)  BP: 123/63 (13 Aug 2019 07:15) (101/66 - 141/79)  BP(mean): --  RR: 18 (13 Aug 2019 07:15) (18 - 18)  SpO2: 97% (13 Aug 2019 07:15) (97% - 100%)    PHYSICAL EXAM:  GENERAL: NAD, well-developed  HEAD:  Atraumatic, Normocephalic  EYES: EOMI, PERRLA, conjunctiva and sclera clear  NECK: Supple, No JVD  CHEST/LUNG: Clear to auscultation bilaterally; No wheeze  HEART: Regular rate and rhythm; No murmurs, rubs, or gallops  ABDOMEN: Soft, Nontender, Nondistended; Bowel sounds present  EXTREMITIES:  2+ Peripheral Pulses, No clubbing, cyanosis, or edema  PSYCH: AAOx3  NEUROLOGY: non-focal  SKIN: No rashes or lesions    LABS:                        10.2   4.69  )-----------( 289      ( 13 Aug 2019 06:05 )             30.7     08-13    136  |  101  |  8   ----------------------------<  91  3.7   |  24  |  0.53    Ca    8.6      13 Aug 2019 06:05  Phos  3.9     08-12  Mg     1.7     08-12    TPro  6.0  /  Alb  2.8<L>  /  TBili  5.9<H>  /  DBili  5.0<H>  /  AST  78<H>  /  ALT  81<H>  /  AlkPhos  609<H>  08-13    PT/INR - ( 12 Aug 2019 05:25 )   PT: 11.6 SEC;   INR: 1.04          PTT - ( 12 Aug 2019 05:25 )  PTT:30.6 SEC          RADIOLOGY & ADDITIONAL TESTS:    Imaging Personally Reviewed:    Consultant(s) Notes Reviewed:      Care Discussed with Consultants/Other Providers: Patient is a 68y old  Female who presents with a chief complaint of Pancreatic mass, Obstructive cholestatic liver disease, Urinary tract infection without Hematuria, Hypokalemia, PVD, Essential Hypertension. (13 Aug 2019 06:53)      SUBJECTIVE / OVERNIGHT EVENTS:  patient awaiting results of  cytology  patient wants no resuscitation- wants dnr  patient will decide which of 4 kids to go to make HCP      MEDICATIONS  (STANDING):  amLODIPine   Tablet 10 milliGRAM(s) Oral daily  ergocalciferol 20418 Unit(s) Oral <User Schedule>  heparin  Injectable 5000 Unit(s) SubCutaneous every 8 hours  multivitamin 1 Tablet(s) Oral daily    MEDICATIONS  (PRN):  ALBUTerol    90 MICROgram(s) HFA Inhaler 2 Puff(s) Inhalation every 6 hours PRN Shortness of Breath and/or Wheezing  morphine  - Injectable 4 milliGRAM(s) IV Push every 6 hours PRN breakthroug severe pain  ondansetron Injectable 4 milliGRAM(s) IV Push every 6 hours PRN Nausea and/or Vomiting  oxyCODONE    IR 5 milliGRAM(s) Oral every 6 hours PRN Moderate Pain (4 - 6) and sever pain      Vital Signs Last 24 Hrs  T(C): 37 (13 Aug 2019 07:15), Max: 37 (13 Aug 2019 07:15)  T(F): 98.6 (13 Aug 2019 07:15), Max: 98.6 (13 Aug 2019 07:15)  HR: 78 (13 Aug 2019 07:15) (53 - 90)  BP: 123/63 (13 Aug 2019 07:15) (101/66 - 141/79)  BP(mean): --  RR: 18 (13 Aug 2019 07:15) (18 - 18)  SpO2: 97% (13 Aug 2019 07:15) (97% - 100%)    PHYSICAL EXAM:  GENERAL: NAD, well-developed  HEAD:  Atraumatic, Normocephalic  EYES: EOMI, PERRLA, conjunctiva and sclera clear  NECK: Supple, No JVD  CHEST/LUNG: Clear to auscultation bilaterally; No wheeze  HEART: Regular rate and rhythm; No murmurs, rubs, or gallops  ABDOMEN: Soft, Nontender, Nondistended; Bowel sounds present  EXTREMITIES:  2+ Peripheral Pulses, No clubbing, cyanosis, or edema  PSYCH: AAOx3  NEUROLOGY: non-focal  SKIN: No rashes or lesions    LABS:                        10.2   4.69  )-----------( 289      ( 13 Aug 2019 06:05 )             30.7     08-13    136  |  101  |  8   ----------------------------<  91  3.7   |  24  |  0.53    Ca    8.6      13 Aug 2019 06:05  Phos  3.9     08-12  Mg     1.7     08-12    TPro  6.0  /  Alb  2.8<L>  /  TBili  5.9<H>  /  DBili  5.0<H>  /  AST  78<H>  /  ALT  81<H>  /  AlkPhos  609<H>  08-13    PT/INR - ( 12 Aug 2019 05:25 )   PT: 11.6 SEC;   INR: 1.04          PTT - ( 12 Aug 2019 05:25 )  PTT:30.6 SEC          RADIOLOGY & ADDITIONAL TESTS:    Imaging Personally Reviewed:    Consultant(s) Notes Reviewed:      Care Discussed with Consultants/Other Providers:

## 2019-08-13 NOTE — PROGRESS NOTE ADULT - PROBLEM SELECTOR PLAN 1
Pancreatic  head mass found at OSH, transferred for EUS, to be performed on Monday.  -CT chset concerning for metastatic disease.  -plan to be determined with GI, surg-onc after EUS. 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  will get surg-oncology eval- Dr Romo called to consult. 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  will get surg-oncology eval- called again to f/u

## 2019-08-14 DIAGNOSIS — R91.1 SOLITARY PULMONARY NODULE: ICD-10-CM

## 2019-08-14 LAB
ANION GAP SERPL CALC-SCNC: 9 MMO/L — SIGNIFICANT CHANGE UP (ref 7–14)
APTT BLD: 37.7 SEC — HIGH (ref 27.5–36.3)
BUN SERPL-MCNC: 5 MG/DL — LOW (ref 7–23)
CALCIUM SERPL-MCNC: 9.1 MG/DL — SIGNIFICANT CHANGE UP (ref 8.4–10.5)
CHLORIDE SERPL-SCNC: 99 MMOL/L — SIGNIFICANT CHANGE UP (ref 98–107)
CO2 SERPL-SCNC: 28 MMOL/L — SIGNIFICANT CHANGE UP (ref 22–31)
CREAT SERPL-MCNC: 0.54 MG/DL — SIGNIFICANT CHANGE UP (ref 0.5–1.3)
GLUCOSE SERPL-MCNC: 99 MG/DL — SIGNIFICANT CHANGE UP (ref 70–99)
HCT VFR BLD CALC: 31.1 % — LOW (ref 34.5–45)
HGB BLD-MCNC: 10.1 G/DL — LOW (ref 11.5–15.5)
INR BLD: 1.1 — SIGNIFICANT CHANGE UP (ref 0.88–1.17)
MCHC RBC-ENTMCNC: 26.8 PG — LOW (ref 27–34)
MCHC RBC-ENTMCNC: 32.5 % — SIGNIFICANT CHANGE UP (ref 32–36)
MCV RBC AUTO: 82.5 FL — SIGNIFICANT CHANGE UP (ref 80–100)
NRBC # FLD: 0 K/UL — SIGNIFICANT CHANGE UP (ref 0–0)
PLATELET # BLD AUTO: 278 K/UL — SIGNIFICANT CHANGE UP (ref 150–400)
PMV BLD: 10.5 FL — SIGNIFICANT CHANGE UP (ref 7–13)
POTASSIUM SERPL-MCNC: 3.9 MMOL/L — SIGNIFICANT CHANGE UP (ref 3.5–5.3)
POTASSIUM SERPL-SCNC: 3.9 MMOL/L — SIGNIFICANT CHANGE UP (ref 3.5–5.3)
PROTHROM AB SERPL-ACNC: 12.3 SEC — SIGNIFICANT CHANGE UP (ref 9.8–13.1)
RBC # BLD: 3.77 M/UL — LOW (ref 3.8–5.2)
RBC # FLD: 17.3 % — HIGH (ref 10.3–14.5)
SODIUM SERPL-SCNC: 136 MMOL/L — SIGNIFICANT CHANGE UP (ref 135–145)
SURGICAL PATHOLOGY STUDY: SIGNIFICANT CHANGE UP
WBC # BLD: 4.51 K/UL — SIGNIFICANT CHANGE UP (ref 3.8–10.5)
WBC # FLD AUTO: 4.51 K/UL — SIGNIFICANT CHANGE UP (ref 3.8–10.5)

## 2019-08-14 PROCEDURE — 99232 SBSQ HOSP IP/OBS MODERATE 35: CPT

## 2019-08-14 PROCEDURE — 99232 SBSQ HOSP IP/OBS MODERATE 35: CPT | Mod: GC

## 2019-08-14 PROCEDURE — 99233 SBSQ HOSP IP/OBS HIGH 50: CPT

## 2019-08-14 PROCEDURE — 99497 ADVNCD CARE PLAN 30 MIN: CPT

## 2019-08-14 PROCEDURE — 93970 EXTREMITY STUDY: CPT | Mod: 26

## 2019-08-14 PROCEDURE — 99223 1ST HOSP IP/OBS HIGH 75: CPT | Mod: GC

## 2019-08-14 PROCEDURE — 99221 1ST HOSP IP/OBS SF/LOW 40: CPT

## 2019-08-14 RX ORDER — HEPARIN SODIUM 5000 [USP'U]/ML
5000 INJECTION INTRAVENOUS; SUBCUTANEOUS ONCE
Refills: 0 | Status: COMPLETED | OUTPATIENT
Start: 2019-08-14 | End: 2019-08-14

## 2019-08-14 RX ADMIN — ONDANSETRON 4 MILLIGRAM(S): 8 TABLET, FILM COATED ORAL at 23:52

## 2019-08-14 RX ADMIN — AMLODIPINE BESYLATE 10 MILLIGRAM(S): 2.5 TABLET ORAL at 05:50

## 2019-08-14 RX ADMIN — Medication 1 TABLET(S): at 16:59

## 2019-08-14 RX ADMIN — HEPARIN SODIUM 5000 UNIT(S): 5000 INJECTION INTRAVENOUS; SUBCUTANEOUS at 19:57

## 2019-08-14 RX ADMIN — HEPARIN SODIUM 5000 UNIT(S): 5000 INJECTION INTRAVENOUS; SUBCUTANEOUS at 05:50

## 2019-08-14 NOTE — CHART NOTE - NSCHARTNOTEFT_GEN_A_CORE
Pt being followed for pancreatic head mass s/p EUS with biopsy and CT chest with lung nodules.     Awaiting pathology  Would consult IR to evaluate for needle bx of lung nodules  heme/onc evaluation to eval for chemotherapy    will follow with you and make further recommendations pending pathology and lung nodule bx    discussed with Dr. Anderson  D team   [id number] Pt being followed for pancreatic head mass s/p EUS with biopsy and CT chest with lung nodules.     Awaiting pathology  Would consult IR to evaluate for needle bx of lung nodules  heme/onc evaluation to eval for chemotherapy    will follow with you and make further recommendations pending pathology and lung nodule bx    discussed with Dr. Anderson  D team   [id number]    Addendum  Pt seen and examined  Agree with above.

## 2019-08-14 NOTE — CONSULT NOTE ADULT - CONSULT REASON
69yo F with pancreatic mass and multiple lung nodules referred to IR for lung nodule biopsy
Preop clearance - Office Patient
panc mass
pancreatic mass
Pancreatic head mass

## 2019-08-14 NOTE — PROGRESS NOTE ADULT - PROBLEM SELECTOR PLAN 1
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  will get surg-oncology eval- called again to f/u

## 2019-08-14 NOTE — PROGRESS NOTE ADULT - SUBJECTIVE AND OBJECTIVE BOX
Chief Complaint:  Patient is a 68y old  Female who presents with a chief complaint of Pancreatic mass, Obstructive cholestatic liver disease, Urinary tract infection without Hematuria, Hypokalemia, PVD, Essential Hypertension. (14 Aug 2019 10:32)      Interval Events: No adverse events overnight.  PT without pain, tolerating diet.     Allergies:  No Known Allergies      Hospital Medications:  ALBUTerol    90 MICROgram(s) HFA Inhaler 2 Puff(s) Inhalation every 6 hours PRN  amLODIPine   Tablet 10 milliGRAM(s) Oral daily  ergocalciferol 18479 Unit(s) Oral <User Schedule>  heparin  Injectable 5000 Unit(s) SubCutaneous once  morphine  - Injectable 4 milliGRAM(s) IV Push every 6 hours PRN  multivitamin 1 Tablet(s) Oral daily  ondansetron Injectable 4 milliGRAM(s) IV Push every 6 hours PRN  oxyCODONE    IR 5 milliGRAM(s) Oral every 6 hours PRN  polyethylene glycol 3350 17 Gram(s) Oral daily      PMHX/PSHX:  Iron deficiency anemia  Claudication  Anemia  Smoking history  HTN (hypertension)  History of colonoscopy  History of intravascular stent placement  History of  section      Family history:  Family history of cancer  Family history of hypertension      ROS:     General:  No wt loss, fevers, chills, night sweats, fatigue,   Eyes:  Good vision, no reported pain  ENT:  No sore throat, pain, runny nose, dysphagia  CV:  No pain, palpitations, hypo/hypertension  Resp:  No dyspnea, cough, tachypnea, wheezing  GI:  See HPI  :  No pain, bleeding, incontinence, nocturia  Muscle:  No pain, weakness  Neuro:  No weakness, tingling, memory problems  Psych:  No fatigue, insomnia, mood problems, depression  Endocrine:  No polyuria, polydipsia, cold/heat intolerance  Heme:  No petechiae, ecchymosis, easy bruisability  Skin:  No rash, edema      PHYSICAL EXAM:     GENERAL: NAD  HEENT:  NC/AT  CHEST:  Full & symmetric excursion, no increased effort  ABDOMEN:  Soft, non-tender, non-distended, +BS  EXTREMITIES:  no edema  SKIN:  No rash  NEURO:  Alert      Vital Signs:  Vital Signs Last 24 Hrs  T(C): 36.8 (14 Aug 2019 05:48), Max: 37.2 (13 Aug 2019 21:23)  T(F): 98.2 (14 Aug 2019 05:48), Max: 98.9 (13 Aug 2019 21:23)  HR: 62 (14 Aug 2019 05:48) (62 - 68)  BP: 135/75 (14 Aug 2019 05:48) (113/55 - 135/75)  BP(mean): --  RR: 18 (14 Aug 2019 05:48) (18 - 18)  SpO2: 97% (14 Aug 2019 05:48) (96% - 99%)  Daily     Daily     LABS:                        10.1   4.51  )-----------( 278      ( 14 Aug 2019 05:26 )             31.1         136  |  99  |  5<L>  ----------------------------<  99  3.9   |  28  |  0.54    Ca    9.1      14 Aug 2019 05:26    TPro  6.0  /  Alb  2.8<L>  /  TBili  5.9<H>  /  DBili  5.0<H>  /  AST  78<H>  /  ALT  81<H>  /  AlkPhos  609<H>  0813    LIVER FUNCTIONS - ( 13 Aug 2019 06:05 )  Alb: 2.8 g/dL / Pro: 6.0 g/dL / ALK PHOS: 609 u/L / ALT: 81 u/L / AST: 78 u/L / GGT: x           PT/INR - ( 14 Aug 2019 05:26 )   PT: 12.3 SEC;   INR: 1.10          PTT - ( 14 Aug 2019 05:26 )  PTT:37.7 SEC        Imaging:  < from: Upper EUS (19 @ 09:26) >    [facility]  _______________________________________________________________________________  Patient Name: Jenn Garcia             Procedure Date: 2019 9:26 AM  MRN: [id number]                     Account Number: [id number]  YOB: 1950             Admit Type: Inpatient  Room: ENDO 01                         Gender: Female  Attending MD: Farhan Pearson MD       _______________________________________________________________________________     Procedure:           Upper EUS  Indications:         Pancreatic tumor on Computed Tomogram Scan  Providers:           Farhan Pearson MD, JAMISON THAO MD (Fellow)  Referring MD:        TALIB STINSON MD  Medicines:           Monitored Anesthesia Care  Complications:       No immediate complications. Estimated blood loss:                        Minimal.  Procedure:           Pre-Anesthesia Assessment:                       - Universal Protocol:                       - Pre-procedure Verification: Prior to the procedure,                        the patient's identity was verified by full name, date                        of birth and medical record number. The patient's                        identity was verified on all pertinent medical records,                        including History and Physical, nursing assessment and                        pre-anesthesia assessment. Also prior to the procedure,                        a History and Physical was performed, and patient      medications, allergies and sensitivities were reviewed.                        The patient's tolerance of previous anesthesia was                        reviewed. The risks and benefits of the procedure and                        the sedation options and risks were discussed with the                        patient. All questions were answered and informed                        consent was obtained.                       - Marking: The endoscopic procedure was visually marked        on a patient wrist band delineating the patient name,                        proposed procedure and endoscopist's initials.                       - Time-Out: Prior to the start of the procedure, the                        patient's identification, proposed procedure, accurate                        signed consent, correctly labeled images and records,                        and need for prophylactic antibiotics were verified by                        the physician, the nurse, the anesthesiologist and the                        anesthetist in the pre-procedure area in the procedure                        room.                       After obtaining informed consent, the endoscope was                        passed under direct vision. Throughout the procedure,                        the patient's blood pressure, pulse, and oxygen                        saturations were monitored continuously. The ERCP was                        introduced through the mouth, and advanced to the                duodenum second part of duodenum. The ERCP was                        accomplished without difficulty. The patient tolerated                        the procedure well. After obtaining informed consent,                        the endoscope was passed under direct vision. Throughout                        the procedure, the patient's blood pressure, pulse, and                        oxygen saturations were monitored continuously.                           Findings:       Endoscopic Finding :       The examined esophagus was normal.       Patchy mildly erythematous mucosa without bleeding was found in the        gastric antrum.       The exam of the stomach was otherwise normal.       A large sessile/edematous mass with no bleeding was found in the second        part of the duodenum. Biopsies were taken with a cold forceps for        histology.       The exam of the duodenum was otherwise normal.       Endosonographic Finding :       A few enlarged lymph nodes were visualized in the peripancreatic region.        The nodes were round, hypoechoic and had well defined margins.       A round mass was identified in the pancreatic head. The mass was        heterogenous. The mass measured 3.8 cm by 3.4 cm in maximal        cross-sectional diameter. The endosonographic borders were        poorly-defined. Fine needle biopsy was performed. Color Doppler imaging        was utilized prior to needle puncture to confirm a lack ofsignificant        vascular structures within the needle path. Four passes were made with        the 22 gauge ultrasound biopsy needle using a transduodenal approach. A        good visible core of tissue was obtained.       There was dilation in the common bile duct which measured up to 15 mm.       The pancreatic duct had a dilated endosonographic appearance in the main        pancreatic duct. The pancreatic duct measured up to 9 mm in diameter.                                 Impression:          - Normal esophagus.                       - Erythematous mucosa in the antrum.                       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 Biopsied.                       - A few enlarged lymph nodes were visualized in the                        peripancreatic region.                       - A mass was identified in the pancreatic head. Fine                        needle biopsy performed.                       - There was dilation in the common bileduct which                        measured up to 15 mm.                       - The pancreatic duct had a dilated endosonographic                        appearance in the main pancreatic duct. The pancreatic                        duct measured up to9 mm in diameter.  Recommendation:      - Return patient to hospital [facility] for ongoing care.                       - Await path results.                       - Resume regular diet.              Attending Participation:       I was present and participated during the entire procedure, including        non-key portions.                                                                                     __________________  Farhan Pearson MD  2019 11:49:57 AM  This report has been signed electronically.  Number of Addenda: 0    Note Initiated On: 2019 9:26 AM    < end of copied text >

## 2019-08-14 NOTE — PROGRESS NOTE ADULT - ATTENDING COMMENTS
Agree with above.   check tte to evaluate LV function  last nst with no ischemia  follow up surgical plan  further workup pending above    Maximiliano Sarmiento MD

## 2019-08-14 NOTE — CONSULT NOTE ADULT - ASSESSMENT
68 year old female, with past history significant for HTN, Anemia, Smoking, PVD, Claudication of R-LE, and RLE stent placement, transferred from [facility] secondary to need for "EUS and possible biopsy of pancreatic mass." Patient c/o persistent mid abdominal pain for ~ 2 to 3 weeks, and associated with nausea, weight loss of 47 pounds in 2 mos, decreased oral intake, persistent diarrhea found to have a pancreatic mass, biliary ductal dilatation and pulmonary nodules.      Pancreatic adenocarcinoma  -confirmed by EGD pancreatic mass biopsy  -likely metastatic to the lungs; awaiting lung biopsy by IR to confirm  -CA 19-9 - 53  -after bx, will need GI follow up for biliary stent given uptrending bilirubin; monitor LFTs daily  -patient with good performance status; likely a candidate for chemotherapy outpatient  -currently no indication for surgical resection  -will refer to [facility] upon discharge    Tammi Mckay  Oncology Fellow  [phone] 68 year old female, with past history significant for HTN, Anemia, Smoking, PVD, Claudication of R-LE, and RLE stent placement, transferred from [facility] secondary to need for "EUS and possible biopsy of pancreatic mass." Patient c/o persistent mid abdominal pain for ~ 2 to 3 weeks, and associated with nausea, weight loss of 47 pounds in 2 mos, decreased oral intake, persistent diarrhea found to have a pancreatic mass, biliary ductal dilatation and pulmonary nodules.      Pancreatic Mass  -awaiting results of   pancreatic mass biopsy  -likely metastatic to the lungs; awaiting lung biopsy by IR to confirm  -CA 19-9 - 53  -after bx, will need GI follow up for biliary stent given uptrending bilirubin; monitor LFTs daily  -patient with good performance status; likely a candidate for chemotherapy outpatient based on results above  -will refer to [facility] upon discharge    Tammi Mckay  Oncology Fellow  [phone]

## 2019-08-14 NOTE — CONSULT NOTE ADULT - ASSESSMENT
67yo F with pancreatic mass and multiple lung nodules referred to IR for lung nodule biopsy  - Pt will need anesthesia for procedure  - DNR/DNI  - On Heparin  - Consentable  - Labs OK  - VSS

## 2019-08-14 NOTE — CONSULT NOTE ADULT - SUBJECTIVE AND OBJECTIVE BOX
Chief complaint:  Patient is a 68y old  Female who presents with a chief complaint of needs lung nodule biopsy    HPI:  68 year old female, with past history significant for HTN, Anemia, Smoking, PVD, Claudication of R-LE, and RLE stent placement, recently found to have pancreatic mass. Imaging also significant for bilateral lung nodules. IR was consulted regarding biopsy. Pt was seen at bedside with no complaints. Denied SOB, CP, fever, NVD.     Allergies  No Known Allergies    MEDICATIONS  (STANDING):  amLODIPine   Tablet 10 milliGRAM(s) Oral daily  ergocalciferol 74297 Unit(s) Oral <User Schedule>  heparin  Injectable 5000 Unit(s) SubCutaneous once  multivitamin 1 Tablet(s) Oral daily  polyethylene glycol 3350 17 Gram(s) Oral daily    MEDICATIONS  (PRN):  ALBUTerol    90 MICROgram(s) HFA Inhaler 2 Puff(s) Inhalation every 6 hours PRN Shortness of Breath and/or Wheezing  morphine  - Injectable 4 milliGRAM(s) IV Push every 6 hours PRN breakthroug severe pain  ondansetron Injectable 4 milliGRAM(s) IV Push every 6 hours PRN Nausea and/or Vomiting  oxyCODONE    IR 5 milliGRAM(s) Oral every 6 hours PRN Moderate Pain (4 - 6) and sever pain      PAST MEDICAL & SURGICAL HISTORY:  Iron deficiency anemia  Claudication: ~ chiefly of RLE  Smoking history: ~ quit ~ 2017  HTN (hypertension)  History of colonoscopy: ~   History of intravascular stent placement: ~ RLE ([facility] ~ 2018)  History of  section      FAMILY HISTORY:  Family history of cancer: ~ mother (unknown type)  Family history of hypertension: ~ mother      Review of Systems:    GENERAL/CONSTITUTIONAL:  As per HPI    HEAD, EYES, EARS, NOSE AND THROAT: Eyes – The patient denies pain, redness, loss of vision, double or blurred vision    CARDIOVASCULAR:  The patient denies chest pain, irregular heartbeats, sudden changes in heartbeat or palpitation, shortness of breath    RESPIRATORY:  The patient denies chronic dry cough, coughing up blood, coughing up mucus    GASTROINTESTINAL:  The patient denies decreased appetite, nausea, vomiting    GENITOURINARY: The patient denies difficult urination, pain or burning with urination, blood in the urine    Physical Exam:    Vital Signs Last 24 Hrs  T(C): 36.7 (14 Aug 2019 13:38), Max: 37.2 (13 Aug 2019 21:23)  T(F): 98.1 (14 Aug 2019 13:38), Max: 98.9 (13 Aug 2019 21:23)  HR: 77 (14 Aug 2019 13:38) (62 - 77)  BP: 110/76 (14 Aug 2019 13:38) (110/76 - 135/75)  BP(mean): --  RR: 18 (14 Aug 2019 05:48) (18 - 18)  SpO2: 98% (14 Aug 2019 13:38) (97% - 99%)    GENERAL APPEARANCE: Well developed, well nourished, in no acute distress.    LUNGS: Auscultation of the lungs revealed normal breath sounds without any other adventitious sounds or rubs.    CARDIOVASCULAR: There was a regular rate and rhythm without any murmurs, gallops, rubs.     ABDOMEN: Soft and nontender with normal bowel sounds.     NEUROLOGIC: Alert and oriented x 3. Normal affect.     CBC                        10.1   4.51  )-----------( 278      ( 14 Aug 2019 05:26 )             31.1       Chemistry  08-14    136  |  99  |  5<L>  ----------------------------<  99  3.9   |  28  |  0.54    Ca    9.1      14 Aug 2019 05:26    TPro  6.0  /  Alb  2.8<L>  /  TBili  5.9<H>  /  DBili  5.0<H>  /  AST  78<H>  /  ALT  81<H>  /  AlkPhos  609<H>  0813    Coags  PT/INR - ( 14 Aug 2019 05:26 )   PT: 12.3 SEC;   INR: 1.10     PTT - ( 14 Aug 2019 05:26 )  PTT:37.7 SEC    < from: CT Chest No Cont (08.10.19 @ 12:36) >  IMPRESSION:     Multiple bilateral lung nodules as described above. The largest in the   right lung measures 1.1 cm. The largest nodule in the left lobe measures   2.6 cm.    < end of copied text >

## 2019-08-14 NOTE — CHART NOTE - NSCHARTNOTEFT_GEN_A_CORE
Patient Age:     Patient Gender:Female    Procedure (including site/side if known):Right Middle lung nodule biopsy     Diagnosis/Indication:Pancreatic mass: lung nodules     Interventional Radiology Attending Physician:Dr. Tee    Ordering Attending Physician:Myrna Valle    Pertinent medical history: hypertension, anemia, pvd, pancreatic mass, anemia, lung nodules     Pertinent Labs:                        10.1   4.51  )-----------( 278      ( 14 Aug 2019 05:26 )             31.1       Patient and Family aware? yes      Attending/Resident/NP/PA    Contact:                   [phone]            Date:           8/14/2019                         time:

## 2019-08-14 NOTE — CHART NOTE - NSCHARTNOTEFT_GEN_A_CORE
Advanced Care note:  Patient seen with daughters and nurses.  Patient wishes to be DNR  patient still wants treatment but she does Not wish resuscitation or intubation.  She will decide which of her 4 daughters she wants for HCP.  hospital course and plan if care explained in detail.  Molst form comlpeted for DNR    40 min for coordination of care

## 2019-08-14 NOTE — PROGRESS NOTE ADULT - SUBJECTIVE AND OBJECTIVE BOX
Patient is a 68y old  Female who presents with a chief complaint of Pancreatic mass, Obstructive cholestatic liver disease, Urinary tract infection without Hematuria, Hypokalemia, PVD, Essential Hypertension. (13 Aug 2019 15:29)      SUBJECTIVE / OVERNIGHT EVENTS:  patient comfortable.  surgery rec bxp of lung    MEDICATIONS  (STANDING):  amLODIPine   Tablet 10 milliGRAM(s) Oral daily  ergocalciferol 29068 Unit(s) Oral <User Schedule>  heparin  Injectable 5000 Unit(s) SubCutaneous every 8 hours  multivitamin 1 Tablet(s) Oral daily  polyethylene glycol 3350 17 Gram(s) Oral daily    MEDICATIONS  (PRN):  ALBUTerol    90 MICROgram(s) HFA Inhaler 2 Puff(s) Inhalation every 6 hours PRN Shortness of Breath and/or Wheezing  morphine  - Injectable 4 milliGRAM(s) IV Push every 6 hours PRN breakthroug severe pain  ondansetron Injectable 4 milliGRAM(s) IV Push every 6 hours PRN Nausea and/or Vomiting  oxyCODONE    IR 5 milliGRAM(s) Oral every 6 hours PRN Moderate Pain (4 - 6) and sever pain      Vital Signs Last 24 Hrs  T(C): 36.8 (14 Aug 2019 05:48), Max: 37.2 (13 Aug 2019 21:23)  T(F): 98.2 (14 Aug 2019 05:48), Max: 98.9 (13 Aug 2019 21:23)  HR: 62 (14 Aug 2019 05:48) (62 - 68)  BP: 135/75 (14 Aug 2019 05:48) (113/55 - 135/75)  BP(mean): --  RR: 18 (14 Aug 2019 05:48) (18 - 18)  SpO2: 97% (14 Aug 2019 05:48) (96% - 99%)      PHYSICAL EXAM:  GENERAL: NAD, well-developed  HEAD:  Atraumatic, Normocephalic  EYES: EOMI, PERRLA, conjunctiva and sclera clear  NECK: Supple, No JVD  CHEST/LUNG: Clear to auscultation bilaterally; No wheeze  HEART: Regular rate and rhythm; No murmurs, rubs, or gallops  ABDOMEN: Soft, Nontender, Nondistended; Bowel sounds present  EXTREMITIES:  2+ Peripheral Pulses, No clubbing, cyanosis, or edema  PSYCH: AAOx3  NEUROLOGY: non-focal  SKIN: jaundice skin    LABS:                        10.1   4.51  )-----------( 278      ( 14 Aug 2019 05:26 )             31.1     08-14    136  |  99  |  5<L>  ----------------------------<  99  3.9   |  28  |  0.54    Ca    9.1      14 Aug 2019 05:26    TPro  6.0  /  Alb  2.8<L>  /  TBili  5.9<H>  /  DBili  5.0<H>  /  AST  78<H>  /  ALT  81<H>  /  AlkPhos  609<H>  08-13    PT/INR - ( 14 Aug 2019 05:26 )   PT: 12.3 SEC;   INR: 1.10          PTT - ( 14 Aug 2019 05:26 )  PTT:37.7 SEC            Care Discussed with Consultants/Other Providers:  Surg- Dr MANUELA enriquez bxp of lung lesion  Ir- md considering bx of lung lesion in AM

## 2019-08-14 NOTE — CONSULT NOTE ADULT - REASON FOR ADMISSION
Pancreatic mass, Obstructive cholestatic liver disease, Urinary tract infection without Hematuria, Hypokalemia, PVD, Essential Hypertension.

## 2019-08-14 NOTE — PROGRESS NOTE ADULT - ATTENDING COMMENTS
Surg- Dr GAY- mina bxp of lung lesion  Ir- md considering bx of lung lesion in AM  npo after mn and hold hep

## 2019-08-14 NOTE — CONSULT NOTE ADULT - SUBJECTIVE AND OBJECTIVE BOX
HPI:  68 year old female, with past history significant for HTN, Anemia, Smoking, PVD, Claudication of R-LE, and RLE stent placement, presented to the floor, as direct transfer from [facility] secondary to need for "EUS and possible biopsy of pancreatic mass."  Seen and evaluated at bedside; NAD.  Patient relates being contacted by her PMD and advised to present to the hospital ED for evaluation after findings of abnormal liver function tests.  Prior to then, patient notes persistent mid abdominal pain, without radiation, for the past ~ 2 to 3 weeks, and associated with nausea, but no vomiting.  Indicates weight loss of 47 pounds from 2019 to 2019.  This was in the setting of decreased oral intake; patient would have appetite for various foods, but would feel full on attempted eating.  Also relates persistent diarrhea, for which she was prescribed loperamide PRN.  Reports chronic intermittent chills, which was presumed to be due to previously diagnosed anemia.  No reports of fevers, sweating, headaches, dizziness, chest pain, shortness of breath, vomiting.    Vital signs upon arrival on the floor as follows: BP =  121/76, HR = 83, RR = 18, T = 36.9 C (98.4 F), O2 Sat = 97% on RA.  Diagnosed with Pancreatic mass, Obstructive cholestatic liver disease, Urinary tract infection without Hematuria, Hypokalemia, PVD, Essential Hypertension. (08 Aug 2019 20:28)      Allergies  No Known Allergies    Intolerances        MEDICATIONS  (STANDING):  amLODIPine   Tablet 10 milliGRAM(s) Oral daily  ergocalciferol 31348 Unit(s) Oral <User Schedule>  heparin  Injectable 5000 Unit(s) SubCutaneous once  multivitamin 1 Tablet(s) Oral daily  polyethylene glycol 3350 17 Gram(s) Oral daily    MEDICATIONS  (PRN):  ALBUTerol    90 MICROgram(s) HFA Inhaler 2 Puff(s) Inhalation every 6 hours PRN Shortness of Breath and/or Wheezing  morphine  - Injectable 4 milliGRAM(s) IV Push every 6 hours PRN breakthroug severe pain  ondansetron Injectable 4 milliGRAM(s) IV Push every 6 hours PRN Nausea and/or Vomiting  oxyCODONE    IR 5 milliGRAM(s) Oral every 6 hours PRN Moderate Pain (4 - 6) and sever pain      PAST MEDICAL & SURGICAL HISTORY:  Iron deficiency anemia  Claudication: ~ chiefly of RLE  Smoking history: ~ quit ~ 2017  HTN (hypertension)  History of colonoscopy: ~ 2015  History of intravascular stent placement: ~ RLE ([facility] ~ 2018)  History of  section      FAMILY HISTORY:  Family history of cancer: ~ mother (unknown type)  Family history of hypertension: ~ mother    SOCIAL HISTORY: No EtOH, no tobacco    REVIEW OF SYSTEMS:  All other review of systems is negative unless indicated above.    T(F): 98.2 (19 @ 05:48), Max: 98.9 (19 @ 21:23)  HR: 62 (19 @ 05:48)  BP: 135/75 (19 @ 05:48)  RR: 18 (19 @ 05:48)  SpO2: 97% (19 @ 05:48)  Wt(kg): --    GENERAL: NAD, well-developed  HEAD:  Atraumatic, Normocephalic  EYES: EOMI, PERRLA, conjunctiva and sclera clear  NECK: Supple, No JVD  CHEST/LUNG: Clear to auscultation bilaterally; No wheeze  HEART: Regular rate and rhythm; No murmurs, rubs, or gallops  ABDOMEN: Soft, Nontender, Nondistended; Bowel sounds present  EXTREMITIES:  2+ Peripheral Pulses, No clubbing, cyanosis, or edema  NEUROLOGY: non-focal  SKIN: No rashes or lesions                          10.1   4.51  )-----------( 278      ( 14 Aug 2019 05:26 )             31.1       -    136  |  99  |  5<L>  ----------------------------<  99  3.9   |  28  |  0.54    Ca    9.1      14 Aug 2019 05:26    TPro  6.0  /  Alb  2.8<L>  /  TBili  5.9<H>  /  DBili  5.0<H>  /  AST  78<H>  /  ALT  81<H>  /  AlkPhos  609<H>   HPI:  68 year old female, with past history significant for HTN, Anemia, Smoking, PVD, Claudication of R-LE, and RLE stent placement, presented to the floor, as direct transfer from [facility] secondary to need for "EUS and possible biopsy of pancreatic mass."  Seen and evaluated at bedside; NAD.  Patient relates being contacted by her PMD and advised to present to the hospital ED for evaluation after findings of abnormal liver function tests.  Prior to then, patient notes persistent mid abdominal pain, without radiation, for the past ~ 2 to 3 weeks, and associated with nausea, but no vomiting.  Indicates weight loss of 47 pounds from 2019 to 2019.  This was in the setting of decreased oral intake; patient would have appetite for various foods, but would feel full on attempted eating.  Also relates persistent diarrhea, for which she was prescribed loperamide PRN.  Reports chronic intermittent chills, which was presumed to be due to previously diagnosed anemia.  No reports of fevers, sweating, headaches, dizziness, chest pain, shortness of breath, vomiting.    CT C/A/P:   Multiple scattered subcentimeter peripheral nodules are present:  *  A right upper lobe measures 0.6 cm (2, 39). The  *  A right middle lobe nodule measures 1.1 cm (3, 336) and (5, 127).  *  Right middle lobe peripheral nodule measures 0.7 cm (3, 251) and abuts   the right minor fissure.  *  The largest nodule in the right lower lobe measures 0.7 cm (3, 417).  *  Left lingular nodule (3, 408) disease measures 0.6 cm.  *  Left lower lobe nodule (3, 374) measures 0.6 cm.  Hypoenhancing 3 x 3.3 cm solid mass in the pancreatic head, concerning   for an adenocarcinoma. It is inseparable from the adjacent duodenum.   Diffuse biliary ductal dilatation. Multiple large gallstones.    s/p EGD with biopsy of pancreatic mass and duodenum.  Pancreatic mass positive for moderate to poorly diff adeno.     Allergies  No Known Allergies    Intolerances        MEDICATIONS  (STANDING):  amLODIPine   Tablet 10 milliGRAM(s) Oral daily  ergocalciferol 52436 Unit(s) Oral <User Schedule>  heparin  Injectable 5000 Unit(s) SubCutaneous once  multivitamin 1 Tablet(s) Oral daily  polyethylene glycol 3350 17 Gram(s) Oral daily    MEDICATIONS  (PRN):  ALBUTerol    90 MICROgram(s) HFA Inhaler 2 Puff(s) Inhalation every 6 hours PRN Shortness of Breath and/or Wheezing  morphine  - Injectable 4 milliGRAM(s) IV Push every 6 hours PRN breakthroug severe pain  ondansetron Injectable 4 milliGRAM(s) IV Push every 6 hours PRN Nausea and/or Vomiting  oxyCODONE    IR 5 milliGRAM(s) Oral every 6 hours PRN Moderate Pain (4 - 6) and sever pain      PAST MEDICAL & SURGICAL HISTORY:  Iron deficiency anemia  Claudication: ~ chiefly of RLE  Smoking history: ~ quit ~ 2017  HTN (hypertension)  History of colonoscopy: ~   History of intravascular stent placement: ~ RLE ([facility] ~ 2018)  History of  section      FAMILY HISTORY:  Family history of cancer: ~ mother (unknown type)  Family history of hypertension: ~ mother    SOCIAL HISTORY: No EtOH, no tobacco    REVIEW OF SYSTEMS:  All other review of systems is negative unless indicated above.    T(F): 98.2 (19 @ 05:48), Max: 98.9 (19 @ 21:23)  HR: 62 (19 @ 05:48)  BP: 135/75 (19 @ 05:48)  RR: 18 (19 @ 05:48)  SpO2: 97% (19 @ 05:48)  Wt(kg): --    GENERAL: NAD, well-developed  HEAD:  Atraumatic, Normocephalic  EYES: EOMI, PERRLA, conjunctiva and sclera clear  NECK: Supple, No JVD  CHEST/LUNG: Clear to auscultation bilaterally; No wheeze  HEART: Regular rate and rhythm; No murmurs, rubs, or gallops  ABDOMEN: Soft, Nontender, Nondistended; Bowel sounds present  EXTREMITIES:  2+ Peripheral Pulses, No clubbing, cyanosis, or edema  NEUROLOGY: non-focal  SKIN: No rashes or lesions                          10.1   4.51  )-----------( 278      ( 14 Aug 2019 05:26 )             31.1           136  |  99  |  5<L>  ----------------------------<  99  3.9   |  28  |  0.54    Ca    9.1      14 Aug 2019 05:26    TPro  6.0  /  Alb  2.8<L>  /  TBili  5.9<H>  /  DBili  5.0<H>  /  AST  78<H>  /  ALT  81<H>  /  AlkPhos  609<H>

## 2019-08-14 NOTE — PROGRESS NOTE ADULT - SUBJECTIVE AND OBJECTIVE BOX
SUBJECTIVE: No CP or SOB    MEDICATIONS  (STANDING):  amLODIPine   Tablet 10 milliGRAM(s) Oral daily  ergocalciferol 13309 Unit(s) Oral <User Schedule>  heparin  Injectable 5000 Unit(s) SubCutaneous once  multivitamin 1 Tablet(s) Oral daily  polyethylene glycol 3350 17 Gram(s) Oral daily    MEDICATIONS  (PRN):  ALBUTerol    90 MICROgram(s) HFA Inhaler 2 Puff(s) Inhalation every 6 hours PRN Shortness of Breath and/or Wheezing  morphine  - Injectable 4 milliGRAM(s) IV Push every 6 hours PRN breakthroug severe pain  ondansetron Injectable 4 milliGRAM(s) IV Push every 6 hours PRN Nausea and/or Vomiting  oxyCODONE    IR 5 milliGRAM(s) Oral every 6 hours PRN Moderate Pain (4 - 6) and sever pain      LABS:                       10.1   4.51  )-----------( 278      ( 14 Aug 2019 05:26 )             31.1    136  |  99  |  5<L>  ----------------------------<  99  3.9   |  28  |  0.54    Ca    9.1      14 Aug 2019 05:26    TPro  6.0  /  Alb  2.8<L>  /  TBili  5.9<H>  /  DBili  5.0<H>  /  AST  78<H>  /  ALT  81<H>  /  AlkPhos  609<H>  08-13  PT/INR - ( 14 Aug 2019 05:26 )   PT: 12.3 SEC;   INR: 1.10     PTT - ( 14 Aug 2019 05:26 )  PTT:37.7 SEC    PHYSICAL EXAM:  Vital Signs Last 24 Hrs  T(C): 36.8 (14 Aug 2019 05:48), Max: 37.2 (13 Aug 2019 21:23)  T(F): 98.2 (14 Aug 2019 05:48), Max: 98.9 (13 Aug 2019 21:23)  HR: 62 (14 Aug 2019 05:48) (62 - 68)  BP: 135/75 (14 Aug 2019 05:48) (113/55 - 135/75)  RR: 18 (14 Aug 2019 05:48) (18 - 18)  SpO2: 97% (14 Aug 2019 05:48) (96% - 99%)    Cardiovascular:  S1S2 RRR, No JVD  Respiratory: Lungs clear to auscultation, normal effort  Gastrointestinal: Abdomen soft, ND, NT, +BS  Skin: Warm, dry, intact. No rash.  Musculoskeletal: Normal ROM, normal strength  Ext: No C/C/E B/L LE    DIAGNOSTIC DATA  EKG: NSR    Echo pending    Echo: Done as outpatient on 6/29/18 (full result placed in physical chart)  IMPRESSION: Normal LV size and systolic function. Estimated LVEF 60%. LV wall motion abnormality - hypokinesis of basal and inferoseptal segments as well as entire inferior wall. Remaining segments with normal wall motion. Grade 1 diastolic dysfunction. Normal RV size and function.    NST: Done in our office on 7/9/18 (full result placed in physical chart)     CONCLUSIONS: Normal Study .   Normal myocardial perfusion SPECT images.   Normal left ventricular size and function. Calculated EF is 63%.     ASSESSMENT AND PLAN:  Patient is a 68 year old female known to our office (Cardiologist - Dr. Cash) with PMHx of HTN, former smoker, anemia, COPD (with inhaler use per patient), PVD s/p RLE bypass in 2018 who had an echo with normal LV/RV function and a normal nuclear stress test in our office last year as preop for LE bypass now transferred from [facility] for EUS/biopsy of pancreatic mass. Cardiology consulted for preop clearance for possible surgery.    - No evidence of clinical HF or anginal symptoms  - DVT prophylaxis (on Heparin SQ)  - BP overall controlled on norvasc  - Previous outpatient echo with normal LVEF and normal nuclear stress test noted above from last year  - Repeat echo to reeval LV function  - GI/Oncology f/u  - Cardiac clearance pending above

## 2019-08-14 NOTE — PROGRESS NOTE ADULT - ASSESSMENT
Impression:  68 year old female, with past history significant for HTN, Anemia, Smoking, PVD, Claudication of R-LE, and RLE stent placement, who presents for evaluation of pancreatic mass from [facility].    #pancreatic mass with obstructive jaundice - s/p EUS with biopsy; no stent placement as patient is asymptomatic from elevated TB  # PVD with RLE stent  #UTI on abx      Recs:  - followup biopsies from EUS/FNA  - possible IR guided lung bx?  - followup med onc recs  - trend CBC, CMP, INR  - advance diet as tolerated

## 2019-08-14 NOTE — PROGRESS NOTE ADULT - ASSESSMENT
68f with 2 weeks of diarrhea and now with pain to the ruq with multiple stones rto the ruq. Pancreatic  head mass, plan for EUS  < from: CT Chest No Cont (08.10.19 @ 12:36) >  IMPRESSION:     Multiple bilateral lung nodules as described above. The largest in the   right lung measures 1.1 cm. The largest nodule in the left lobe measures   2.6 cm.    Pancreatic  head mass found at OSH, transferred for EUS, to be performed on Monday.  -CT chest concerning for metastatic disease.  Abd pain sec to pancreatic lesion- start oxy ir prn with v morphine for sever brakthrough pain  severe protein ashlee malnutrition- start ensure tid  EUS 8/12/19  Impression:          - Normal esophagus.                       - Erythematous mucosa in the antrum.                       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 Biopsied.                       - A few enlarged lymph nodes were visualized in the                        peripancreatic region.                       - A mass was identified in the pancreatic head. Fine                        needle biopsy performed.                       - There was dilation in the common bile duct which                        measured up to 15 mm.                       - The pancreatic duct had a dilated endosonographic                        appearance in the main pancreatic duct. The pancreatic                        duct measured up to 9 mm in diameter.  Recommendation:      - Return patient to hospital [facility] for ongoing care.                       - Await path results.                       - Resume regular diet.

## 2019-08-14 NOTE — CONSULT NOTE ADULT - PROBLEM SELECTOR RECOMMENDATION 9
- Please keep pt NPO starting midnight prior to procedure  - Hold heparin for at least 4 hours prior  - Consent obtained  - DNR addressed, rescinded during procedure

## 2019-08-15 ENCOUNTER — RESULT REVIEW (OUTPATIENT)
Age: 69
End: 2019-08-15

## 2019-08-15 LAB
ALBUMIN SERPL ELPH-MCNC: 3.1 G/DL — LOW (ref 3.3–5)
ALP SERPL-CCNC: 627 U/L — HIGH (ref 40–120)
ALT FLD-CCNC: 86 U/L — HIGH (ref 4–33)
ANION GAP SERPL CALC-SCNC: 12 MMO/L — SIGNIFICANT CHANGE UP (ref 7–14)
APTT BLD: 32.9 SEC — SIGNIFICANT CHANGE UP (ref 27.5–36.3)
AST SERPL-CCNC: 82 U/L — HIGH (ref 4–32)
BILIRUB SERPL-MCNC: 7.1 MG/DL — HIGH (ref 0.2–1.2)
BUN SERPL-MCNC: 6 MG/DL — LOW (ref 7–23)
CALCIUM SERPL-MCNC: 9.2 MG/DL — SIGNIFICANT CHANGE UP (ref 8.4–10.5)
CHLORIDE SERPL-SCNC: 99 MMOL/L — SIGNIFICANT CHANGE UP (ref 98–107)
CO2 SERPL-SCNC: 24 MMOL/L — SIGNIFICANT CHANGE UP (ref 22–31)
CREAT SERPL-MCNC: 0.55 MG/DL — SIGNIFICANT CHANGE UP (ref 0.5–1.3)
GLUCOSE SERPL-MCNC: 99 MG/DL — SIGNIFICANT CHANGE UP (ref 70–99)
HCT VFR BLD CALC: 32.3 % — LOW (ref 34.5–45)
HGB BLD-MCNC: 10.8 G/DL — LOW (ref 11.5–15.5)
INR BLD: 1.05 — SIGNIFICANT CHANGE UP (ref 0.88–1.17)
MAGNESIUM SERPL-MCNC: 1.6 MG/DL — SIGNIFICANT CHANGE UP (ref 1.6–2.6)
MCHC RBC-ENTMCNC: 27.1 PG — SIGNIFICANT CHANGE UP (ref 27–34)
MCHC RBC-ENTMCNC: 33.4 % — SIGNIFICANT CHANGE UP (ref 32–36)
MCV RBC AUTO: 81 FL — SIGNIFICANT CHANGE UP (ref 80–100)
NRBC # FLD: 0.02 K/UL — SIGNIFICANT CHANGE UP (ref 0–0)
PHOSPHATE SERPL-MCNC: 3.4 MG/DL — SIGNIFICANT CHANGE UP (ref 2.5–4.5)
PLATELET # BLD AUTO: 293 K/UL — SIGNIFICANT CHANGE UP (ref 150–400)
PMV BLD: 10.5 FL — SIGNIFICANT CHANGE UP (ref 7–13)
POTASSIUM SERPL-MCNC: 3.9 MMOL/L — SIGNIFICANT CHANGE UP (ref 3.5–5.3)
POTASSIUM SERPL-SCNC: 3.9 MMOL/L — SIGNIFICANT CHANGE UP (ref 3.5–5.3)
PROT SERPL-MCNC: 6.4 G/DL — SIGNIFICANT CHANGE UP (ref 6–8.3)
PROTHROM AB SERPL-ACNC: 11.7 SEC — SIGNIFICANT CHANGE UP (ref 9.8–13.1)
RBC # BLD: 3.99 M/UL — SIGNIFICANT CHANGE UP (ref 3.8–5.2)
RBC # FLD: 17.2 % — HIGH (ref 10.3–14.5)
SODIUM SERPL-SCNC: 135 MMOL/L — SIGNIFICANT CHANGE UP (ref 135–145)
WBC # BLD: 4.93 K/UL — SIGNIFICANT CHANGE UP (ref 3.8–10.5)
WBC # FLD AUTO: 4.93 K/UL — SIGNIFICANT CHANGE UP (ref 3.8–10.5)

## 2019-08-15 PROCEDURE — 99232 SBSQ HOSP IP/OBS MODERATE 35: CPT | Mod: GC

## 2019-08-15 PROCEDURE — 88305 TISSUE EXAM BY PATHOLOGIST: CPT | Mod: 26

## 2019-08-15 PROCEDURE — 88173 CYTOPATH EVAL FNA REPORT: CPT | Mod: 26

## 2019-08-15 PROCEDURE — 71045 X-RAY EXAM CHEST 1 VIEW: CPT | Mod: 26,76

## 2019-08-15 PROCEDURE — 77012 CT SCAN FOR NEEDLE BIOPSY: CPT | Mod: 26

## 2019-08-15 PROCEDURE — 32405: CPT

## 2019-08-15 PROCEDURE — 88342 IMHCHEM/IMCYTCHM 1ST ANTB: CPT | Mod: 26

## 2019-08-15 PROCEDURE — 88341 IMHCHEM/IMCYTCHM EA ADD ANTB: CPT | Mod: 26

## 2019-08-15 PROCEDURE — 99233 SBSQ HOSP IP/OBS HIGH 50: CPT

## 2019-08-15 RX ORDER — OXYCODONE HYDROCHLORIDE 5 MG/1
5 TABLET ORAL EVERY 6 HOURS
Refills: 0 | Status: DISCONTINUED | OUTPATIENT
Start: 2019-08-15 | End: 2019-08-15

## 2019-08-15 RX ORDER — MORPHINE SULFATE 50 MG/1
4 CAPSULE, EXTENDED RELEASE ORAL EVERY 6 HOURS
Refills: 0 | Status: DISCONTINUED | OUTPATIENT
Start: 2019-08-15 | End: 2019-08-21

## 2019-08-15 RX ORDER — OXYCODONE HYDROCHLORIDE 5 MG/1
5 TABLET ORAL EVERY 6 HOURS
Refills: 0 | Status: DISCONTINUED | OUTPATIENT
Start: 2019-08-15 | End: 2019-08-21

## 2019-08-15 RX ORDER — VANCOMYCIN HCL 1 G
250 VIAL (EA) INTRAVENOUS ONCE
Refills: 0 | Status: DISCONTINUED | OUTPATIENT
Start: 2019-08-15 | End: 2019-08-15

## 2019-08-15 RX ADMIN — AMLODIPINE BESYLATE 10 MILLIGRAM(S): 2.5 TABLET ORAL at 14:57

## 2019-08-15 RX ADMIN — Medication 1 TABLET(S): at 14:56

## 2019-08-15 NOTE — PROGRESS NOTE ADULT - SUBJECTIVE AND OBJECTIVE BOX
Patient is a 68y old  Female who presents with a chief complaint of Pancreatic mass, Obstructive cholestatic liver disease, Urinary tract infection without Hematuria, Hypokalemia, PVD, Essential Hypertension. (15 Aug 2019 10:10)      SUBJECTIVE / OVERNIGHT EVENTS:  ir  to do bxp of chest    MEDICATIONS  (STANDING):  amLODIPine   Tablet 10 milliGRAM(s) Oral daily  ergocalciferol 53041 Unit(s) Oral <User Schedule>  multivitamin 1 Tablet(s) Oral daily  polyethylene glycol 3350 17 Gram(s) Oral daily  vancomycin  IVPB 250 milliGRAM(s) IV Intermittent once    MEDICATIONS  (PRN):  ALBUTerol    90 MICROgram(s) HFA Inhaler 2 Puff(s) Inhalation every 6 hours PRN Shortness of Breath and/or Wheezing  morphine  - Injectable 4 milliGRAM(s) IV Push every 6 hours PRN breakthroug severe pain  ondansetron Injectable 4 milliGRAM(s) IV Push every 6 hours PRN Nausea and/or Vomiting  oxyCODONE    IR 5 milliGRAM(s) Oral every 6 hours PRN Moderate Pain (4 - 6) and sever pain      Vital Signs Last 24 Hrs  T(C): 36.7 (15 Aug 2019 14:52), Max: 37 (14 Aug 2019 21:54)  T(F): 98.1 (15 Aug 2019 14:52), Max: 98.6 (14 Aug 2019 21:54)  HR: 71 (15 Aug 2019 14:52) (69 - 72)  BP: 135/87 (15 Aug 2019 14:52) (116/78 - 135/87)  BP(mean): --  RR: 16 (15 Aug 2019 14:52) (16 - 18)  SpO2: 97% (15 Aug 2019 14:52) (97% - 100%)      PHYSICAL EXAM:  GENERAL: NAD, well-developed  HEAD:  Atraumatic, Normocephalic  EYES: EOMI, PERRLA, conjunctiva and sclera - jaundice  NECK: Supple, No JVD  CHEST/LUNG: Clear to auscultation bilaterally; No wheeze  HEART: Regular rate and rhythm; No murmurs, rubs, or gallops  ABDOMEN: Soft, Nontender, Nondistended; Bowel sounds present  EXTREMITIES:  2+ Peripheral Pulses, No clubbing, cyanosis, or edema  PSYCH: AAOx3  NEUROLOGY: non-focal  SKIN: jaundice    LABS:                        10.8   4.93  )-----------( 293      ( 15 Aug 2019 06:00 )             32.3     08-15    135  |  99  |  6<L>  ----------------------------<  99  3.9   |  24  |  0.55    Ca    9.2      15 Aug 2019 06:00  Phos  3.4     08-15  Mg     1.6     08-15    TPro  6.4  /  Alb  3.1<L>  /  TBili  7.1<H>  /  DBili  x   /  AST  82<H>  /  ALT  86<H>  /  AlkPhos  627<H>  08-15    PT/INR - ( 15 Aug 2019 06:00 )   PT: 11.7 SEC;   INR: 1.05          PTT - ( 15 Aug 2019 06:00 )  PTT:32.9 SEC          Care Discussed with Consultants/Other Providers:  ir/ onc

## 2019-08-15 NOTE — PROGRESS NOTE ADULT - SUBJECTIVE AND OBJECTIVE BOX
INTERVAL HPI/OVERNIGHT EVENTS:  Patient S&E at bedside. s/p right sided lung biopsy.  No complaints.    VITAL SIGNS:  T(F): 98.1 (08-15-19 @ 14:52)  HR: 71 (08-15-19 @ 14:52)  BP: 135/87 (08-15-19 @ 14:52)  RR: 16 (08-15-19 @ 14:52)  SpO2: 97% (08-15-19 @ 14:52)  Wt(kg): --    PHYSICAL EXAM:  Constitutional: NAD  Eyes: EOMI, sclera non-icteric  Neck: supple, no masses, no JVD  Respiratory: CTA b/l, good air entry b/l  Cardiovascular: RRR, no M/R/G  Gastrointestinal: soft, NTND, no masses palpable, + BS  Extremities: no c/c/e  Neurological: AAOx3    MEDICATIONS  (STANDING):  amLODIPine   Tablet 10 milliGRAM(s) Oral daily  ergocalciferol 05852 Unit(s) Oral <User Schedule>  multivitamin 1 Tablet(s) Oral daily  polyethylene glycol 3350 17 Gram(s) Oral daily  vancomycin  IVPB 250 milliGRAM(s) IV Intermittent once    MEDICATIONS  (PRN):  ALBUTerol    90 MICROgram(s) HFA Inhaler 2 Puff(s) Inhalation every 6 hours PRN Shortness of Breath and/or Wheezing  morphine  - Injectable 4 milliGRAM(s) IV Push every 6 hours PRN breakthroug severe pain  ondansetron Injectable 4 milliGRAM(s) IV Push every 6 hours PRN Nausea and/or Vomiting  oxyCODONE    IR 5 milliGRAM(s) Oral every 6 hours PRN Moderate Pain (4 - 6) and sever pain      Allergies    No Known Allergies    Intolerances        LABS:                        10.8   4.93  )-----------( 293      ( 15 Aug 2019 06:00 )             32.3     08-15    135  |  99  |  6<L>  ----------------------------<  99  3.9   |  24  |  0.55    Ca    9.2      15 Aug 2019 06:00  Phos  3.4     08-15  Mg     1.6     08-15    TPro  6.4  /  Alb  3.1<L>  /  TBili  7.1<H>  /  DBili  x   /  AST  82<H>  /  ALT  86<H>  /  AlkPhos  627<H>  08-15    PT/INR - ( 15 Aug 2019 06:00 )   PT: 11.7 SEC;   INR: 1.05          PTT - ( 15 Aug 2019 06:00 )  PTT:32.9 SEC      RADIOLOGY & ADDITIONAL TESTS:  Studies reviewed.    ASSESSMENT & PLAN:

## 2019-08-15 NOTE — PROGRESS NOTE ADULT - SUBJECTIVE AND OBJECTIVE BOX
Chief Complaint:  Patient is a 68y old  Female who presents with a chief complaint of Pancreatic mass, Obstructive cholestatic liver disease, Urinary tract infection without Hematuria, Hypokalemia, PVD, Essential Hypertension. (14 Aug 2019 17:11)      Interval Events: Path returned from panc mass biopsy consistent with pancreatic adenocarcinoma.    Allergies:  No Known Allergies      Hospital Medications:  ALBUTerol    90 MICROgram(s) HFA Inhaler 2 Puff(s) Inhalation every 6 hours PRN  amLODIPine   Tablet 10 milliGRAM(s) Oral daily  ergocalciferol 78371 Unit(s) Oral <User Schedule>  morphine  - Injectable 4 milliGRAM(s) IV Push every 6 hours PRN  multivitamin 1 Tablet(s) Oral daily  ondansetron Injectable 4 milliGRAM(s) IV Push every 6 hours PRN  oxyCODONE    IR 5 milliGRAM(s) Oral every 6 hours PRN  polyethylene glycol 3350 17 Gram(s) Oral daily      PMHX/PSHX:  Iron deficiency anemia  Claudication  Anemia  Smoking history  HTN (hypertension)  History of colonoscopy  History of intravascular stent placement  History of  section      Family history:  Family history of cancer  Family history of hypertension      ROS:     General:  No wt loss, fevers, chills, night sweats, fatigue,   Eyes:  Good vision, no reported pain  ENT:  No sore throat, pain, runny nose, dysphagia  CV:  No pain, palpitations, hypo/hypertension  Resp:  No dyspnea, cough, tachypnea, wheezing  GI:  See HPI  :  No pain, bleeding, incontinence, nocturia  Muscle:  No pain, weakness  Neuro:  No weakness, tingling, memory problems  Psych:  No fatigue, insomnia, mood problems, depression  Endocrine:  No polyuria, polydipsia, cold/heat intolerance  Heme:  No petechiae, ecchymosis, easy bruisability  Skin:  No rash, edema      PHYSICAL EXAM:     GENERAL: NAD  HEENT:  NC/AT  CHEST:  Full & symmetric excursion, no increased effort  ABDOMEN:  Soft, non-tender, non-distended, +BS  EXTREMITIES:  no edema  SKIN:  No rash  NEURO:  Alert      Vital Signs:  Vital Signs Last 24 Hrs  T(C): 36.7 (15 Aug 2019 06:08), Max: 37 (14 Aug 2019 21:54)  T(F): 98.1 (15 Aug 2019 06:08), Max: 98.6 (14 Aug 2019 21:54)  HR: 72 (15 Aug 2019 06:08) (69 - 77)  BP: 116/78 (15 Aug 2019 06:08) (110/76 - 126/65)  BP(mean): --  RR: 18 (15 Aug 2019 06:08) (18 - 18)  SpO2: 100% (15 Aug 2019 06:08) (98% - 100%)  Daily     Daily     LABS:                        10.1   4.51  )-----------( 278      ( 14 Aug 2019 05:26 )             31.1     08-15    135  |  99  |  6<L>  ----------------------------<  99  3.9   |  24  |  0.55    Ca    9.2      15 Aug 2019 06:00  Phos  3.4     08-15  Mg     1.6     08-15    TPro  6.4  /  Alb  3.1<L>  /  TBili  7.1<H>  /  DBili  x   /  AST  82<H>  /  ALT  86<H>  /  AlkPhos  627<H>  08-15    LIVER FUNCTIONS - ( 15 Aug 2019 06:00 )  Alb: 3.1 g/dL / Pro: 6.4 g/dL / ALK PHOS: 627 u/L / ALT: 86 u/L / AST: 82 u/L / GGT: x           PT/INR - ( 15 Aug 2019 06:00 )   PT: 11.7 SEC;   INR: 1.05          PTT - ( 15 Aug 2019 06:00 )  PTT:32.9 SEC        Imaging:  reviewed

## 2019-08-15 NOTE — CHART NOTE - NSCHARTNOTEFT_GEN_A_CORE
Chart reviewed and biopsy results noted.     Pt with + adenoCA on bx of pancreatic mass. s/p IR guided lung bx today.     Awaiting pathology from lung bx to evaluate for metastatic disease.     Appreciate heme/onc input.    Can follow up with Dr. Anderson as an outpatient after deemed medically stable for discharge. [phone]

## 2019-08-15 NOTE — PROGRESS NOTE ADULT - ASSESSMENT
68 year old female, with past history significant for HTN, Anemia, Smoking, PVD, Claudication of R-LE, and RLE stent placement, transferred from [facility] secondary to need for "EUS and possible biopsy of pancreatic mass." Patient c/o persistent mid abdominal pain for ~ 2 to 3 weeks, and associated with nausea, weight loss of 47 pounds in 2 mos, decreased oral intake, persistent diarrhea found to have a pancreatic mass, biliary ductal dilatation and pulmonary nodules.      Pancreatic Adenocarcinoma  -pancreatic mass biopsy showed moderately to poorly diff adeno; duodenal biopsy showed mucosa with extensive gastric foveolar metaplasia, suggestive of peptic injury  -likely metastatic to the lungs; s/p IR lung biopsy on 8/15  -CA 19-9 - 53  -rec GI follow up for biliary stent given uptrending bilirubin, now bilirubin 7; monitor LFTs daily  -patient with good performance status; likely a candidate for chemotherapy outpatient based on results above  -will refer to [facility] upon discharge    Tammi Mckay  Oncology Fellow  [phone] 68 year old female, with past history significant for HTN, Anemia, Smoking, PVD, Claudication of R-LE, and RLE stent placement, transferred from [facility] secondary to need for "EUS and possible biopsy of pancreatic mass." Patient c/o persistent mid abdominal pain for ~ 2 to 3 weeks, and associated with nausea, weight loss of 47 pounds in 2 mos, decreased oral intake, persistent diarrhea found to have a pancreatic mass, biliary ductal dilatation and pulmonary nodules.      Pancreatic Adenocarcinoma  -pancreatic mass biopsy showed moderately to poorly diff adeno; duodenal biopsy showed mucosa with extensive gastric foveolar metaplasia, suggestive of peptic injury  -likely metastatic to the lungs; s/p IR lung biopsy on 8/15, path pending  -CA 19-9 - 53  -rec GI follow up for biliary stent given uptrending bilirubin, now bilirubin 7; monitor LFTs daily  -patient with good performance status; likely a candidate for chemotherapy outpatient based on results above  -will refer to L[facility] upon discharge    Tammi Mckay  Oncology Fellow  [phone]

## 2019-08-15 NOTE — PROGRESS NOTE ADULT - SUBJECTIVE AND OBJECTIVE BOX
Interventional Radiology Brief- Operative Note    Procedure: Right Lung Biopsy    Operators: Juve Tee MD; Dewayne Jacobs MD     Anesthesia (type): Monitored anesthesia Care    Contrast: None    EBL: Minimal    Findings/Follow up Plan of Care:  Cluster of atypical cells per cytopathology    Specimens Removed: 3 core biopsy    Implants: Biocentry closure device.    Complications: Tiny pneumothorax    Condition/Disposition: Stable to recovery then floor.     Please call Interventional Radiology x [phone] with any questions, concerns, or issues.

## 2019-08-15 NOTE — PROGRESS NOTE ADULT - ATTENDING COMMENTS
ir bxp of lung lesion today ir bxp of lung lesion today  gi to help with hyperbil    Ir/gi/ surg-onc and onc appreciated.  most likely not a surg candidate.  will need dec bili to be chemo candidate ir bxp of lung lesion today  gi to help with hyperbil    Ir/gi/ surg-onc and onc appreciated.  most likely not a surg candidate.  will need dec bili to be chemo candidate  adeno ca of pancrease

## 2019-08-15 NOTE — PROGRESS NOTE ADULT - SUBJECTIVE AND OBJECTIVE BOX
SUBJECTIVE: No CP or SOB      MEDICATIONS  (STANDING):  amLODIPine   Tablet 10 milliGRAM(s) Oral daily  ergocalciferol 76727 Unit(s) Oral <User Schedule>  multivitamin 1 Tablet(s) Oral daily  polyethylene glycol 3350 17 Gram(s) Oral daily  vancomycin  IVPB 250 milliGRAM(s) IV Intermittent once    MEDICATIONS  (PRN):  ALBUTerol    90 MICROgram(s) HFA Inhaler 2 Puff(s) Inhalation every 6 hours PRN Shortness of Breath and/or Wheezing  morphine  - Injectable 4 milliGRAM(s) IV Push every 6 hours PRN breakthroug severe pain  ondansetron Injectable 4 milliGRAM(s) IV Push every 6 hours PRN Nausea and/or Vomiting  oxyCODONE    IR 5 milliGRAM(s) Oral every 6 hours PRN Moderate Pain (4 - 6) and sever pain      LABS:                            10.8   4.93  )-----------( 293      ( 15 Aug 2019 06:00 )             32.3     135  |  99  |  6<L>  ----------------------------<  99  3.9   |  24  |  0.55    Ca    9.2      15 Aug 2019 06:00  Phos  3.4     08-15  Mg     1.6     08-15    TPro  6.4  /  Alb  3.1<L>  /  TBili  7.1<H>  /  DBili  x   /  AST  82<H>  /  ALT  86<H>  /  AlkPhos  627<H>  08-15    Creatinine Trend: 0.55<--, 0.54<--, 0.53<--, 0.67<--, 0.59<--, 0.50<--   PT/INR - ( 15 Aug 2019 06:00 )   PT: 11.7 SEC;   INR: 1.05     PTT - ( 15 Aug 2019 06:00 )  PTT:32.9 SEC    PHYSICAL EXAM  Vital Signs Last 24 Hrs  T(C): 36.7 (15 Aug 2019 14:52), Max: 37 (14 Aug 2019 21:54)  T(F): 98.1 (15 Aug 2019 14:52), Max: 98.6 (14 Aug 2019 21:54)  HR: 71 (15 Aug 2019 14:52) (69 - 72)  BP: 135/87 (15 Aug 2019 14:52) (116/78 - 135/87)  RR: 16 (15 Aug 2019 14:52) (16 - 18)  SpO2: 97% (15 Aug 2019 14:52) (97% - 100%)    Cardiovascular:  S1S2 RRR, No JVD  Respiratory: Lungs clear to auscultation, normal effort  Gastrointestinal: Abdomen soft, ND, NT, +BS  Skin: Warm, dry, intact. No rash.  Musculoskeletal: Normal ROM, normal strength  Ext: No C/C/E B/L LE    DIAGNOSTIC DATA  EKG: NSR    Echo pending    Echo: Done as outpatient on 6/29/18 (full result placed in physical chart)  IMPRESSION: Normal LV size and systolic function. Estimated LVEF 60%. LV wall motion abnormality - hypokinesis of basal and inferoseptal segments as well as entire inferior wall. Remaining segments with normal wall motion. Grade 1 diastolic dysfunction. Normal RV size and function.    NST: Done in our office on 7/9/18 (full result placed in physical chart)     CONCLUSIONS: Normal Study .   Normal myocardial perfusion SPECT images.   Normal left ventricular size and function. Calculated EF is 63%.     ASSESSMENT AND PLAN:  Patient is a 68 year old female known to our office (Cardiologist - Dr. Cash) with PMHx of HTN, former smoker, anemia, COPD (with inhaler use per patient), PVD s/p RLE bypass in 2018 who had an echo with normal LV/RV function and a normal nuclear stress test in our office last year as preop for LE bypass now transferred from [facility] for EUS/biopsy of pancreatic mass. And s/p IR Lung Biopsy.  Cardiology consulted for preop clearance for possible surgery.    - No evidence of clinical HF or anginal symptoms  - DVT prophylaxis (on Heparin SQ)  - BP overall controlled on norvasc  - Previous outpatient echo with normal LVEF and normal nuclear stress test noted above from last year  - Repeat echo to re-eval LV function  --await pathology results/?surgical plan

## 2019-08-15 NOTE — PROGRESS NOTE ADULT - ASSESSMENT
68f with 2 weeks of diarrhea and now with pain to the ruq with multiple stones rto the ruq. Pancreatic  head mass, plan for EUS  < from: CT Chest No Cont (08.10.19 @ 12:36) >  IMPRESSION:     Multiple bilateral lung nodules as described above. The largest in the   right lung measures 1.1 cm. The largest nodule in the left lobe measures   2.6 cm.    Pancreatic  head mass found at OSH, transferred for EUS, to be performed on Monday.  -CT chest concerning for metastatic disease.  Abd pain sec to pancreatic lesion- start oxy ir prn with v morphine for sever brakthrough pain  severe protein ashlee malnutrition- start ensure tid  EUS 8/12/19  Impression:          - Normal esophagus.                       - Erythematous mucosa in the antrum.                       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 Biopsied.                       - A few enlarged lymph nodes were visualized in the                        peripancreatic region.                       - A mass was identified in the pancreatic head. Fine                        needle biopsy performed.                       - There was dilation in the common bile duct which                        measured up to 15 mm.                       - The pancreatic duct had a dilated endosonographic                        appearance in the main pancreatic duct. The pancreatic                        duct measured up to 9 mm in diameter.  Recommendation:      - Return patient to hospital [facility] for ongoing care.                       - Await path results.                       - Resume regular diet.  PATH- ADENO CA OF PANCREAS  Pancreatic ca with ? mets to lung and possible micro mets to liver with biliary dil and hyperbilirubinemia and inc lft  ir bx of chest today  gi to help with hyperbili

## 2019-08-15 NOTE — PROGRESS NOTE ADULT - ATTENDING COMMENTS
Agree with above  follow up surgery and biopsy results  further cardiac workup depends on surgical plan    aMximiliano Sarmiento MD

## 2019-08-16 PROBLEM — Z87.891 PERSONAL HISTORY OF NICOTINE DEPENDENCE: Chronic | Status: ACTIVE | Noted: 2019-08-08

## 2019-08-16 PROBLEM — D50.9 IRON DEFICIENCY ANEMIA, UNSPECIFIED: Chronic | Status: ACTIVE | Noted: 2019-08-08

## 2019-08-16 LAB
ALBUMIN SERPL ELPH-MCNC: 2.9 G/DL — LOW (ref 3.3–5)
ALP SERPL-CCNC: 564 U/L — HIGH (ref 40–120)
ALT FLD-CCNC: 77 U/L — HIGH (ref 4–33)
ANION GAP SERPL CALC-SCNC: 13 MMO/L — SIGNIFICANT CHANGE UP (ref 7–14)
AST SERPL-CCNC: 71 U/L — HIGH (ref 4–32)
BILIRUB SERPL-MCNC: 7.6 MG/DL — HIGH (ref 0.2–1.2)
BUN SERPL-MCNC: 8 MG/DL — SIGNIFICANT CHANGE UP (ref 7–23)
CALCIUM SERPL-MCNC: 9.1 MG/DL — SIGNIFICANT CHANGE UP (ref 8.4–10.5)
CHLORIDE SERPL-SCNC: 99 MMOL/L — SIGNIFICANT CHANGE UP (ref 98–107)
CO2 SERPL-SCNC: 24 MMOL/L — SIGNIFICANT CHANGE UP (ref 22–31)
CREAT SERPL-MCNC: 0.72 MG/DL — SIGNIFICANT CHANGE UP (ref 0.5–1.3)
GLUCOSE SERPL-MCNC: 107 MG/DL — HIGH (ref 70–99)
HCT VFR BLD CALC: 29.3 % — LOW (ref 34.5–45)
HGB BLD-MCNC: 9.9 G/DL — LOW (ref 11.5–15.5)
INR BLD: 1.02 — SIGNIFICANT CHANGE UP (ref 0.88–1.17)
MAGNESIUM SERPL-MCNC: 1.6 MG/DL — SIGNIFICANT CHANGE UP (ref 1.6–2.6)
MCHC RBC-ENTMCNC: 27.4 PG — SIGNIFICANT CHANGE UP (ref 27–34)
MCHC RBC-ENTMCNC: 33.8 % — SIGNIFICANT CHANGE UP (ref 32–36)
MCV RBC AUTO: 81.2 FL — SIGNIFICANT CHANGE UP (ref 80–100)
NRBC # FLD: 0 K/UL — SIGNIFICANT CHANGE UP (ref 0–0)
PHOSPHATE SERPL-MCNC: 3.8 MG/DL — SIGNIFICANT CHANGE UP (ref 2.5–4.5)
PLATELET # BLD AUTO: 302 K/UL — SIGNIFICANT CHANGE UP (ref 150–400)
PMV BLD: 10.7 FL — SIGNIFICANT CHANGE UP (ref 7–13)
POTASSIUM SERPL-MCNC: 4.1 MMOL/L — SIGNIFICANT CHANGE UP (ref 3.5–5.3)
POTASSIUM SERPL-SCNC: 4.1 MMOL/L — SIGNIFICANT CHANGE UP (ref 3.5–5.3)
PROT SERPL-MCNC: 6.2 G/DL — SIGNIFICANT CHANGE UP (ref 6–8.3)
PROTHROM AB SERPL-ACNC: 11.6 SEC — SIGNIFICANT CHANGE UP (ref 9.8–13.1)
RBC # BLD: 3.61 M/UL — LOW (ref 3.8–5.2)
RBC # FLD: 17.6 % — HIGH (ref 10.3–14.5)
SODIUM SERPL-SCNC: 136 MMOL/L — SIGNIFICANT CHANGE UP (ref 135–145)
WBC # BLD: 4.98 K/UL — SIGNIFICANT CHANGE UP (ref 3.8–10.5)
WBC # FLD AUTO: 4.98 K/UL — SIGNIFICANT CHANGE UP (ref 3.8–10.5)

## 2019-08-16 PROCEDURE — 93306 TTE W/DOPPLER COMPLETE: CPT | Mod: 26

## 2019-08-16 PROCEDURE — 99233 SBSQ HOSP IP/OBS HIGH 50: CPT

## 2019-08-16 RX ORDER — ENOXAPARIN SODIUM 100 MG/ML
40 INJECTION SUBCUTANEOUS DAILY
Refills: 0 | Status: DISCONTINUED | OUTPATIENT
Start: 2019-08-16 | End: 2019-08-18

## 2019-08-16 RX ADMIN — AMLODIPINE BESYLATE 10 MILLIGRAM(S): 2.5 TABLET ORAL at 05:50

## 2019-08-16 NOTE — PROGRESS NOTE ADULT - ASSESSMENT
68f with 2 weeks of diarrhea and now with pain to the ruq with multiple stones rto the ruq, found to have a Pancreatic  head mass at OSH and obstructive jaundice, transferred for EUS. Pt, underwent EUS with biopsy on 8/12, which showed pancreatic adenocarinoma, she was also found to have a  CT chest concerning for metastatic disease, s/p IR biopsy on 8/15.  Pt. with uptrending Tbili, awaiting advance GI. 68f with 2 weeks of diarrhea and now with pain to the ruq with multiple stones rto the ruq, found to have a Pancreatic  head mass at OSH and obstructive jaundice, transferred for EUS. Pt, underwent EUS with biopsy on 8/12, which showed pancreatic adenocarinoma, she was also found to have a  CT chest concerning for metastatic disease, s/p IR biopsy on 8/15.  Pt. with uptrending Tbili, awaiting GI procedure

## 2019-08-16 NOTE — PROGRESS NOTE ADULT - PROBLEM SELECTOR PLAN 1
Pancreatic  head mass found at OSH, transferred for EUS- malignant duodenal he3ad mass and pancreatic head mass - confirmed pancreatic adenocarcinoma   -CT chest concerning for metastatic disease - s/p biopsy on 8/15  will get surg-oncology eval- called again to f/u

## 2019-08-16 NOTE — PROGRESS NOTE ADULT - PROBLEM SELECTOR PLAN 2
likely in setting of pancreatic mass,   Eus 8/12- likely in setting of pancreatic mass,   Eus 8/12- with pancreatic adenocarcinoma   -

## 2019-08-16 NOTE — PROGRESS NOTE ADULT - SUBJECTIVE AND OBJECTIVE BOX
Patient is a 68y old  Female who presents with a chief complaint of Pancreatic mass, Obstructive cholestatic liver disease, Urinary tract infection without Hematuria, Hypokalemia, PVD, Essential Hypertension. (16 Aug 2019 10:15)      SUBJECTIVE / OVERNIGHT EVENTS: Patient with uptrending Tbili of 7.6 today. She denies any fever, chills,     Review of Systems:     MEDICATIONS  (STANDING):  amLODIPine   Tablet 10 milliGRAM(s) Oral daily  ergocalciferol 02320 Unit(s) Oral <User Schedule>  multivitamin 1 Tablet(s) Oral daily  polyethylene glycol 3350 17 Gram(s) Oral daily    MEDICATIONS  (PRN):  ALBUTerol    90 MICROgram(s) HFA Inhaler 2 Puff(s) Inhalation every 6 hours PRN Shortness of Breath and/or Wheezing  morphine  - Injectable 4 milliGRAM(s) IV Push every 6 hours PRN breakthroug severe pain  ondansetron Injectable 4 milliGRAM(s) IV Push every 6 hours PRN Nausea and/or Vomiting  oxyCODONE    IR 5 milliGRAM(s) Oral every 6 hours PRN Moderate Pain (4 - 6)      PHYSICAL EXAM:  T(C): 37 (08-16-19 @ 10:26), Max: 37.3 (08-15-19 @ 21:51)  HR: 75 (08-16-19 @ 10:26) (71 - 83)  BP: 131/76 (08-16-19 @ 10:26) (113/66 - 135/87)  RR: 16 (08-16-19 @ 10:26) (16 - 18)  SpO2: 100% (08-16-19 @ 10:26) (97% - 100%)  I&O's Summary    GENERAL: NAD, well-developed  HEAD:  Atraumatic, Normocephalic  NECK: Supple, No elevated JVD  CHEST/LUNG: Clear to auscultation bilaterally; No wheeze  HEART: Regular rate and rhythm; No murmurs, rubs, or gallops  ABDOMEN: Soft, Nontender, Nondistended; Bowel sounds present  EXTREMITIES:  2+ Peripheral Pulses, No clubbing, cyanosis, or edema  PSYCH: AAOx3  NEUROLOGY: CN II-XII grossly intact, moving all extremities  SKIN: Jaundiced   LABS:  CAPILLARY BLOOD GLUCOSE                              9.9    4.98  )-----------( 302      ( 16 Aug 2019 06:30 )             29.3     08-16    136  |  99  |  8   ----------------------------<  107<H>  4.1   |  24  |  0.72    Ca    9.1      16 Aug 2019 06:30  Phos  3.8     08-16  Mg     1.6     08-16    TPro  6.2  /  Alb  2.9<L>  /  TBili  7.6<H>  /  DBili  x   /  AST  71<H>  /  ALT  77<H>  /  AlkPhos  564<H>  08-16    PT/INR - ( 16 Aug 2019 06:30 )   PT: 11.6 SEC;   INR: 1.02          PTT - ( 15 Aug 2019 06:00 )  PTT:32.9 SEC          RADIOLOGY & ADDITIONAL TESTS:    Telemetry Personally Reviewed -     Imaging Personally Reviewed -     Imaging Reviewed -     Consultant(s) Notes Reviewed -   Heme/onc, GI, Cardiology     Care Discussed with Consultants/Other Providers - Patient is a 68y old  Female who presents with a chief complaint of Pancreatic mass, Obstructive cholestatic liver disease, Urinary tract infection without Hematuria, Hypokalemia, PVD, Essential Hypertension. (16 Aug 2019 10:15)      SUBJECTIVE / OVERNIGHT EVENTS: Patient with uptrending Tbili of 7.6 today. She denies any fever, chills, or SOB. No nausea or vomiting.     Review of Systems:     MEDICATIONS  (STANDING):  amLODIPine   Tablet 10 milliGRAM(s) Oral daily  ergocalciferol 40323 Unit(s) Oral <User Schedule>  multivitamin 1 Tablet(s) Oral daily  polyethylene glycol 3350 17 Gram(s) Oral daily    MEDICATIONS  (PRN):  ALBUTerol    90 MICROgram(s) HFA Inhaler 2 Puff(s) Inhalation every 6 hours PRN Shortness of Breath and/or Wheezing  morphine  - Injectable 4 milliGRAM(s) IV Push every 6 hours PRN breakthroug severe pain  ondansetron Injectable 4 milliGRAM(s) IV Push every 6 hours PRN Nausea and/or Vomiting  oxyCODONE    IR 5 milliGRAM(s) Oral every 6 hours PRN Moderate Pain (4 - 6)      PHYSICAL EXAM:  T(C): 37 (08-16-19 @ 10:26), Max: 37.3 (08-15-19 @ 21:51)  HR: 75 (08-16-19 @ 10:26) (71 - 83)  BP: 131/76 (08-16-19 @ 10:26) (113/66 - 135/87)  RR: 16 (08-16-19 @ 10:26) (16 - 18)  SpO2: 100% (08-16-19 @ 10:26) (97% - 100%)  I&O's Summary    GENERAL: NAD, well-developed  HEAD:  Atraumatic, Normocephalic  NECK: Supple, No elevated JVD  CHEST/LUNG: Clear to auscultation bilaterally; No wheeze  HEART: Regular rate and rhythm; No murmurs, rubs, or gallops  ABDOMEN: Soft, Nontender, Nondistended; Bowel sounds present  EXTREMITIES:  2+ Peripheral Pulses, No clubbing, cyanosis, or edema  PSYCH: AAOx3  NEUROLOGY: CN II-XII grossly intact, moving all extremities  SKIN: Jaundiced   LABS:  CAPILLARY BLOOD GLUCOSE                              9.9    4.98  )-----------( 302      ( 16 Aug 2019 06:30 )             29.3     08-16    136  |  99  |  8   ----------------------------<  107<H>  4.1   |  24  |  0.72    Ca    9.1      16 Aug 2019 06:30  Phos  3.8     08-16  Mg     1.6     08-16    TPro  6.2  /  Alb  2.9<L>  /  TBili  7.6<H>  /  DBili  x   /  AST  71<H>  /  ALT  77<H>  /  AlkPhos  564<H>  08-16    PT/INR - ( 16 Aug 2019 06:30 )   PT: 11.6 SEC;   INR: 1.02          PTT - ( 15 Aug 2019 06:00 )  PTT:32.9 SEC          RADIOLOGY & ADDITIONAL TESTS:    Telemetry Personally Reviewed -     Imaging Personally Reviewed -     Imaging Reviewed -     Consultant(s) Notes Reviewed -   Heme/onc, GI, Cardiology     Care Discussed with Consultants/Other Providers -

## 2019-08-16 NOTE — PROGRESS NOTE ADULT - SUBJECTIVE AND OBJECTIVE BOX
SUBJECTIVE: No CP or SOB      ALBUTerol    90 MICROgram(s) HFA Inhaler 2 Puff(s) Inhalation every 6 hours PRN  amLODIPine   Tablet 10 milliGRAM(s) Oral daily  ergocalciferol 20603 Unit(s) Oral <User Schedule>  morphine  - Injectable 4 milliGRAM(s) IV Push every 6 hours PRN  multivitamin 1 Tablet(s) Oral daily  ondansetron Injectable 4 milliGRAM(s) IV Push every 6 hours PRN  oxyCODONE    IR 5 milliGRAM(s) Oral every 6 hours PRN  polyethylene glycol 3350 17 Gram(s) Oral daily                        9.9    4.98  )-----------( 302      ( 16 Aug 2019 06:30 )             29.3     Hemoglobin: 9.9 g/dL (08-16 @ 06:30)  Hemoglobin: 10.8 g/dL (08-15 @ 06:00)  Hemoglobin: 10.1 g/dL (08-14 @ 05:26)  Hemoglobin: 10.2 g/dL (08-13 @ 06:05)  Hemoglobin: 9.4 g/dL (08-12 @ 05:25)    08-16    136  |  99  |  8   ----------------------------<  107<H>  4.1   |  24  |  0.72    Ca    9.1      16 Aug 2019 06:30  Phos  3.8     08-16  Mg     1.6     08-16    TPro  6.2  /  Alb  2.9<L>  /  TBili  7.6<H>  /  DBili  x   /  AST  71<H>  /  ALT  77<H>  /  AlkPhos  564<H>  08-16    Creatinine Trend: 0.72<--, 0.55<--, 0.54<--, 0.53<--, 0.67<--, 0.59<--    COAGS: PT/INR - ( 16 Aug 2019 06:30 )   PT: 11.6 SEC;   INR: 1.02       T(C): 36.6 (08-16-19 @ 05:50), Max: 37.3 (08-15-19 @ 21:51)  HR: 73 (08-16-19 @ 05:50) (71 - 83)  BP: 126/79 (08-16-19 @ 05:50) (113/66 - 135/87)  RR: 18 (08-16-19 @ 05:50) (16 - 18)  SpO2: 98% (08-16-19 @ 05:50) (97% - 98%)  Wt(kg): --    I&O's Summary      Cardiovascular:  S1S2 RRR, No JVD  Respiratory: Lungs clear to auscultation, normal effort  Gastrointestinal: Abdomen soft, ND, NT, +BS  Skin: Warm, dry, intact. No rash.  Musculoskeletal: Normal ROM, normal strength  Ext: No C/C/E B/L LE    DIAGNOSTIC DATA  EKG: NSR    Echo pending    Echo: Done as outpatient on 6/29/18 (full result placed in physical chart)  IMPRESSION: Normal LV size and systolic function. Estimated LVEF 60%. LV wall motion abnormality - hypokinesis of basal and inferoseptal segments as well as entire inferior wall. Remaining segments with normal wall motion. Grade 1 diastolic dysfunction. Normal RV size and function.    NST: Done in our office on 7/9/18 (full result placed in physical chart)     CONCLUSIONS: Normal Study .   Normal myocardial perfusion SPECT images.   Normal left ventricular size and function. Calculated EF is 63%.     ASSESSMENT AND PLAN:  Patient is a 68 year old female known to our office (Cardiologist - Dr. Cash) with PMHx of HTN, former smoker, anemia, COPD (with inhaler use per patient), PVD s/p RLE bypass in 2018 who had an echo with normal LV/RV function and a normal nuclear stress test in our office last year as preop for LE bypass now transferred from [facility] for EUS/biopsy of pancreatic mass. And s/p IR Lung Biopsy.  Cardiology consulted for preop clearance for possible surgery.    - No evidence of clinical HF or anginal symptoms  - DVT prophylaxis (on Heparin SQ)  - BP overall controlled on norvasc  - Previous outpatient echo with normal LVEF and normal nuclear stress test noted above from last year  - Repeat echo to re-eval LV function  --await pathology results/?surgical plan  -f/u GI ERCP today   -appreciate heme following; OP follow up with [facility] on d/c

## 2019-08-16 NOTE — PROGRESS NOTE ADULT - ATTENDING COMMENTS
Patient seen and examined.  Agree with above.   -check tte  -follow up surgical plan  -further workup pending above    Maximiliano Sarmiento MD

## 2019-08-16 NOTE — PROGRESS NOTE ADULT - PROBLEM SELECTOR PLAN 6
currently on cilostazol  will check Duplex for possible blood clot currently on cilostazol  - no evidence of blood clot on Duplex

## 2019-08-17 DIAGNOSIS — E87.1 HYPO-OSMOLALITY AND HYPONATREMIA: ICD-10-CM

## 2019-08-17 LAB
ALBUMIN SERPL ELPH-MCNC: 3 G/DL — LOW (ref 3.3–5)
ALP SERPL-CCNC: 551 U/L — HIGH (ref 40–120)
ALT FLD-CCNC: 75 U/L — HIGH (ref 4–33)
ANION GAP SERPL CALC-SCNC: 12 MMO/L — SIGNIFICANT CHANGE UP (ref 7–14)
AST SERPL-CCNC: 79 U/L — HIGH (ref 4–32)
BILIRUB SERPL-MCNC: 7.3 MG/DL — HIGH (ref 0.2–1.2)
BUN SERPL-MCNC: 8 MG/DL — SIGNIFICANT CHANGE UP (ref 7–23)
CALCIUM SERPL-MCNC: 9.3 MG/DL — SIGNIFICANT CHANGE UP (ref 8.4–10.5)
CHLORIDE SERPL-SCNC: 99 MMOL/L — SIGNIFICANT CHANGE UP (ref 98–107)
CO2 SERPL-SCNC: 23 MMOL/L — SIGNIFICANT CHANGE UP (ref 22–31)
CREAT SERPL-MCNC: 0.54 MG/DL — SIGNIFICANT CHANGE UP (ref 0.5–1.3)
GLUCOSE SERPL-MCNC: 104 MG/DL — HIGH (ref 70–99)
HCT VFR BLD CALC: 27.9 % — LOW (ref 34.5–45)
HGB BLD-MCNC: 9.4 G/DL — LOW (ref 11.5–15.5)
MAGNESIUM SERPL-MCNC: 1.7 MG/DL — SIGNIFICANT CHANGE UP (ref 1.6–2.6)
MCHC RBC-ENTMCNC: 27 PG — SIGNIFICANT CHANGE UP (ref 27–34)
MCHC RBC-ENTMCNC: 33.7 % — SIGNIFICANT CHANGE UP (ref 32–36)
MCV RBC AUTO: 80.2 FL — SIGNIFICANT CHANGE UP (ref 80–100)
NRBC # FLD: 0 K/UL — SIGNIFICANT CHANGE UP (ref 0–0)
PHOSPHATE SERPL-MCNC: 3.4 MG/DL — SIGNIFICANT CHANGE UP (ref 2.5–4.5)
PLATELET # BLD AUTO: 301 K/UL — SIGNIFICANT CHANGE UP (ref 150–400)
PMV BLD: 10.1 FL — SIGNIFICANT CHANGE UP (ref 7–13)
POTASSIUM SERPL-MCNC: 4 MMOL/L — SIGNIFICANT CHANGE UP (ref 3.5–5.3)
POTASSIUM SERPL-SCNC: 4 MMOL/L — SIGNIFICANT CHANGE UP (ref 3.5–5.3)
PROT SERPL-MCNC: 6.1 G/DL — SIGNIFICANT CHANGE UP (ref 6–8.3)
RBC # BLD: 3.48 M/UL — LOW (ref 3.8–5.2)
RBC # FLD: 17.9 % — HIGH (ref 10.3–14.5)
SODIUM SERPL-SCNC: 134 MMOL/L — LOW (ref 135–145)
WBC # BLD: 5.39 K/UL — SIGNIFICANT CHANGE UP (ref 3.8–10.5)
WBC # FLD AUTO: 5.39 K/UL — SIGNIFICANT CHANGE UP (ref 3.8–10.5)

## 2019-08-17 PROCEDURE — 99232 SBSQ HOSP IP/OBS MODERATE 35: CPT

## 2019-08-17 RX ADMIN — ENOXAPARIN SODIUM 40 MILLIGRAM(S): 100 INJECTION SUBCUTANEOUS at 13:36

## 2019-08-17 RX ADMIN — ONDANSETRON 4 MILLIGRAM(S): 8 TABLET, FILM COATED ORAL at 18:56

## 2019-08-17 RX ADMIN — ERGOCALCIFEROL 50000 UNIT(S): 1.25 CAPSULE ORAL at 05:59

## 2019-08-17 RX ADMIN — Medication 1 TABLET(S): at 13:36

## 2019-08-17 NOTE — PROGRESS NOTE ADULT - SUBJECTIVE AND OBJECTIVE BOX
SUBJECTIVE: No CP or SOB      ALBUTerol    90 MICROgram(s) HFA Inhaler 2 Puff(s) Inhalation every 6 hours PRN  amLODIPine   Tablet 10 milliGRAM(s) Oral daily  enoxaparin Injectable 40 milliGRAM(s) SubCutaneous daily  ergocalciferol 34170 Unit(s) Oral <User Schedule>  morphine  - Injectable 4 milliGRAM(s) IV Push every 6 hours PRN  multivitamin 1 Tablet(s) Oral daily  ondansetron Injectable 4 milliGRAM(s) IV Push every 6 hours PRN  oxyCODONE    IR 5 milliGRAM(s) Oral every 6 hours PRN  polyethylene glycol 3350 17 Gram(s) Oral daily                       9.4    5.39  )-----------( 301      ( 17 Aug 2019 06:15 )             27.9     Hemoglobin: 9.4 g/dL (08-17 @ 06:15)  Hemoglobin: 9.9 g/dL (08-16 @ 06:30)  Hemoglobin: 10.8 g/dL (08-15 @ 06:00)  Hemoglobin: 10.1 g/dL (08-14 @ 05:26)  Hemoglobin: 10.2 g/dL (08-13 @ 06:05)    08-17    134<L>  |  99  |  8   ----------------------------<  104<H>  4.0   |  23  |  0.54    Ca    9.3      17 Aug 2019 06:15  Phos  3.4     08-17  Mg     1.7     08-17    TPro  6.1  /  Alb  3.0<L>  /  TBili  7.3<H>  /  DBili  x   /  AST  79<H>  /  ALT  75<H>  /  AlkPhos  551<H>  08-17    Creatinine Trend: 0.54<--, 0.72<--, 0.55<--, 0.54<--, 0.53<--, 0.67<--    COAGS:     T(C): 37.1 (08-17-19 @ 05:58), Max: 37.1 (08-17-19 @ 05:58)  HR: 81 (08-17-19 @ 05:58) (68 - 85)  BP: 103/71 (08-17-19 @ 05:58) (102/60 - 129/68)  RR: 18 (08-17-19 @ 05:58) (18 - 18)  SpO2: 100% (08-17-19 @ 05:58) (99% - 100%)  Wt(kg): --    I&O's Summary      Cardiovascular:  S1S2 RRR, No JVD  Respiratory: Lungs clear to auscultation, normal effort  Gastrointestinal: Abdomen soft, ND, NT, +BS  Skin: Warm, dry, intact. No rash.  Musculoskeletal: Normal ROM, normal strength  Ext: No C/C/E B/L LE    DIAGNOSTIC DATA  EKG: NSR    Echo pending    Echo: Done as outpatient on 6/29/18 (full result placed in physical chart)  IMPRESSION: Normal LV size and systolic function. Estimated LVEF 60%. LV wall motion abnormality - hypokinesis of basal and inferoseptal segments as well as entire inferior wall. Remaining segments with normal wall motion. Grade 1 diastolic dysfunction. Normal RV size and function.    NST: Done in our office on 7/9/18 (full result placed in physical chart)     CONCLUSIONS: Normal Study .   Normal myocardial perfusion SPECT images.   Normal left ventricular size and function. Calculated EF is 63%.     ASSESSMENT AND PLAN:  Patient is a 68 year old female known to our office (Cardiologist - Dr. Cash) with PMHx of HTN, former smoker, anemia, COPD (with inhaler use per patient), PVD s/p RLE bypass in 2018 who had an echo with normal LV/RV function and a normal nuclear stress test in our office last year as preop for LE bypass now transferred from [facility] for EUS/biopsy of pancreatic mass. And s/p IR Lung Biopsy.  Cardiology consulted for preop clearance for possible surgery.    - No evidence of clinical HF or anginal symptoms  - Previous outpatient echo with normal LVEF and normal nuclear stress test noted above from last year  - Repeat echo to re-eval LV function  --s/p Lung Bx, await pathology results/?surgical plan  - ERCP postponed until Monday, f/u primary team   -appreciate heme following; OP follow up with [facility] on d/c

## 2019-08-17 NOTE — PROGRESS NOTE ADULT - ATTENDING COMMENTS
Patient with rising bilirubin in setting of metastatic pancreatic cancer. Will need ERCP and likely bare metal stent from GI.

## 2019-08-17 NOTE — PROGRESS NOTE ADULT - ASSESSMENT
67 yo F with 2 weeks of diarrhea and RUQ pain, found to have a pancreatic head mass and obstructive jaundice at OSH, transfered to [facility] for EUS. EUS with biopsy on 8/12 showed pancreatic adenocarinoma. CT chest concerning for metastatic disease, s/p IR biopsy on 8/15.  Pt. with elevated Tbili, awaiting GI procedure on 8/19.    Plan:  - Awaiting GI procedure, ERCP? on 8/19  - Surgical plan pending path and ERCP  - Remainder of care per primary team    Pt seen and examined on AM rounds with Dr. Ramos

## 2019-08-17 NOTE — PROGRESS NOTE ADULT - SUBJECTIVE AND OBJECTIVE BOX
Patient is a 68y old  Female who presents with a chief complaint of Pancreatic mass, Obstructive cholestatic liver disease, Urinary tract infection without Hematuria, Hypokalemia, PVD, Essential Hypertension. (16 Aug 2019 13:40)      SUBJECTIVE / OVERNIGHT EVENTS:  Case delayed given emergency in GI suite, plan for Monday. Patient's DVT ppx re-started.       Review of Systems:     MEDICATIONS  (STANDING):  amLODIPine   Tablet 10 milliGRAM(s) Oral daily  enoxaparin Injectable 40 milliGRAM(s) SubCutaneous daily  ergocalciferol 15586 Unit(s) Oral <User Schedule>  multivitamin 1 Tablet(s) Oral daily  polyethylene glycol 3350 17 Gram(s) Oral daily    MEDICATIONS  (PRN):  ALBUTerol    90 MICROgram(s) HFA Inhaler 2 Puff(s) Inhalation every 6 hours PRN Shortness of Breath and/or Wheezing  morphine  - Injectable 4 milliGRAM(s) IV Push every 6 hours PRN breakthroug severe pain  ondansetron Injectable 4 milliGRAM(s) IV Push every 6 hours PRN Nausea and/or Vomiting  oxyCODONE    IR 5 milliGRAM(s) Oral every 6 hours PRN Moderate Pain (4 - 6)      PHYSICAL EXAM:  T(C): 37.1 (08-17-19 @ 05:58), Max: 37.1 (08-17-19 @ 05:58)  HR: 81 (08-17-19 @ 05:58) (68 - 85)  BP: 103/71 (08-17-19 @ 05:58) (102/60 - 131/76)  RR: 18 (08-17-19 @ 05:58) (16 - 18)  SpO2: 100% (08-17-19 @ 05:58) (99% - 100%)  I&O's Summary    GENERAL: NAD, well-developed  HEAD:  Atraumatic, Normocephalic  EYES: EOMI, PERRLA, conjunctiva and sclera clear  NECK: Supple, No elevated JVD  CHEST/LUNG: Clear to auscultation bilaterally; No wheeze  HEART: Regular rate and rhythm; No murmurs, rubs, or gallops  ABDOMEN: Soft, Nontender, Nondistended; Bowel sounds present  EXTREMITIES:  2+ Peripheral Pulses, No clubbing, cyanosis, or edema  PSYCH: AAOx3  NEUROLOGY: CN II-XII grossly intact, moving all extremities  SKIN: No rashes or lesions    LABS:  CAPILLARY BLOOD GLUCOSE                              9.9    4.98  )-----------( 302      ( 16 Aug 2019 06:30 )             29.3     08-16    136  |  99  |  8   ----------------------------<  107<H>  4.1   |  24  |  0.72    Ca    9.1      16 Aug 2019 06:30  Phos  3.8     08-16  Mg     1.6     08-16    TPro  6.2  /  Alb  2.9<L>  /  TBili  7.6<H>  /  DBili  x   /  AST  71<H>  /  ALT  77<H>  /  AlkPhos  564<H>  08-16    PT/INR - ( 16 Aug 2019 06:30 )   PT: 11.6 SEC;   INR: 1.02                    RADIOLOGY & ADDITIONAL TESTS:    Telemetry Personally Reviewed -     Imaging Personally Reviewed -     Imaging Reviewed -     Consultant(s) Notes Reviewed -       Care Discussed with Consultants/Other Providers - Patient is a 68y old  Female who presents with a chief complaint of Pancreatic mass, Obstructive cholestatic liver disease, Urinary tract infection without Hematuria, Hypokalemia, PVD, Essential Hypertension. (16 Aug 2019 13:40)      SUBJECTIVE / OVERNIGHT EVENTS:  Case delayed given emergency in GI suite, plan for Monday. Patient's DVT ppx re-started. This morning, patient without complaint, denies fevers, chills, abdominal pain.       Review of Systems:     MEDICATIONS  (STANDING):  amLODIPine   Tablet 10 milliGRAM(s) Oral daily  enoxaparin Injectable 40 milliGRAM(s) SubCutaneous daily  ergocalciferol 73789 Unit(s) Oral <User Schedule>  multivitamin 1 Tablet(s) Oral daily  polyethylene glycol 3350 17 Gram(s) Oral daily    MEDICATIONS  (PRN):  ALBUTerol    90 MICROgram(s) HFA Inhaler 2 Puff(s) Inhalation every 6 hours PRN Shortness of Breath and/or Wheezing  morphine  - Injectable 4 milliGRAM(s) IV Push every 6 hours PRN breakthroug severe pain  ondansetron Injectable 4 milliGRAM(s) IV Push every 6 hours PRN Nausea and/or Vomiting  oxyCODONE    IR 5 milliGRAM(s) Oral every 6 hours PRN Moderate Pain (4 - 6)      PHYSICAL EXAM:  T(C): 37.1 (08-17-19 @ 05:58), Max: 37.1 (08-17-19 @ 05:58)  HR: 81 (08-17-19 @ 05:58) (68 - 85)  BP: 103/71 (08-17-19 @ 05:58) (102/60 - 131/76)  RR: 18 (08-17-19 @ 05:58) (16 - 18)  SpO2: 100% (08-17-19 @ 05:58) (99% - 100%)  I&O's Summary    GENERAL: NAD, well-developed  HEAD:  Atraumatic, Normocephalic  EYES: EOMI, PERRLA, conjunctiva and sclera clear  NECK: Supple, No elevated JVD  CHEST/LUNG: Clear to auscultation bilaterally; No wheeze  HEART: Regular rate and rhythm; No murmurs, rubs, or gallops  ABDOMEN: Soft, Nontender, Nondistended; Bowel sounds present  EXTREMITIES:  2+ Peripheral Pulses, No clubbing, cyanosis, or edema  PSYCH: AAOx3  NEUROLOGY: CN II-XII grossly intact, moving all extremities  SKIN: Jaundiced     LABS:  CAPILLARY BLOOD GLUCOSE                              9.9    4.98  )-----------( 302      ( 16 Aug 2019 06:30 )             29.3     08-16    136  |  99  |  8   ----------------------------<  107<H>  4.1   |  24  |  0.72    Ca    9.1      16 Aug 2019 06:30  Phos  3.8     08-16  Mg     1.6     08-16    TPro  6.2  /  Alb  2.9<L>  /  TBili  7.6<H>  /  DBili  x   /  AST  71<H>  /  ALT  77<H>  /  AlkPhos  564<H>  08-16    PT/INR - ( 16 Aug 2019 06:30 )   PT: 11.6 SEC;   INR: 1.02                    RADIOLOGY & ADDITIONAL TESTS:    Telemetry Personally Reviewed -     Imaging Personally Reviewed -     Imaging Reviewed -     Consultant(s) Notes Reviewed -       Care Discussed with Consultants/Other Providers -

## 2019-08-17 NOTE — PROGRESS NOTE ADULT - PROBLEM SELECTOR PLAN 1
Pancreatic  head mass found at OSH, transferred for EUS- malignant duodenal he3ad mass and pancreatic head mass - confirmed pancreatic adenocarcinoma   -CT chest concerning for metastatic disease - s/p biopsy on 8/15  will get surg-oncology eval- called again to f/u Pancreatic  head mass found at OSH, transferred for EUS- malignant duodenal head mass and pancreatic head mass - confirmed pancreatic adenocarcinoma   -CT chest concerning for metastatic disease - s/p biopsy on 8/15  will get surg-oncology eval- called again to f/u

## 2019-08-17 NOTE — PROGRESS NOTE ADULT - PROBLEM SELECTOR PLAN 2
likely in setting of pancreatic mass,   Eus 8/12- with pancreatic adenocarcinoma   - likely in setting of pancreatic mass,   Eus 8/12- with pancreatic adenocarcinoma   - tentative plan for biliary stent on 8/19, given elevated bilirubin of 7

## 2019-08-17 NOTE — PROGRESS NOTE ADULT - SUBJECTIVE AND OBJECTIVE BOX
Team D Surgery Progress Note     SUBJECTIVE / 24H EVENTS  Patient seen and examined on morning rounds. No acute events overnight. Pt doing well overnight. Denies nausea/vomiting. Bilirubin uptrending, now 7.3.    OBJECTIVE:    VITAL SIGNS:  T(C): 36.8 (08-17-19 @ 21:41), Max: 37.3 (08-17-19 @ 15:00)  HR: 64 (08-17-19 @ 21:41) (64 - 81)  BP: 115/67 (08-17-19 @ 21:41) (103/71 - 115/67)  RR: 18 (08-17-19 @ 21:41) (18 - 20)  SpO2: 97% (08-17-19 @ 21:41) (97% - 100%)  Daily     Daily       PHYSICAL EXAM:  Gen: NAD.  Resp: Respirations unlabored.  Card: RRR.   GI: Soft. Nontender. Nondistended.  Ext: Moves all extremities spontaneously.        LAB VALUES:  08-17    134<L>  |  99  |  8   ----------------------------<  104<H>  4.0   |  23  |  0.54    Ca    9.3      17 Aug 2019 06:15  Phos  3.4     08-17  Mg     1.7     08-17    TPro  6.1  /  Alb  3.0<L>  /  TBili  7.3<H>  /  DBili  x   /  AST  79<H>  /  ALT  75<H>  /  AlkPhos  551<H>  08-17                               9.4    5.39  )-----------( 301      ( 17 Aug 2019 06:15 )             27.9     LIVER FUNCTIONS - ( 17 Aug 2019 06:15 )  Alb: 3.0 g/dL / Pro: 6.1 g/dL / ALK PHOS: 551 u/L / ALT: 75 u/L / AST: 79 u/L / GGT: x           PT/INR - ( 16 Aug 2019 06:30 )   PT: 11.6 SEC;   INR: 1.02                      MICROBIOLOGY:    No new microbiology data for review.     RADIOLOGY:    No new radiographic images for review.    MEDICATIONS  (STANDING):  amLODIPine   Tablet 10 milliGRAM(s) Oral daily  enoxaparin Injectable 40 milliGRAM(s) SubCutaneous daily  ergocalciferol 28625 Unit(s) Oral <User Schedule>  multivitamin 1 Tablet(s) Oral daily  polyethylene glycol 3350 17 Gram(s) Oral daily    MEDICATIONS  (PRN):  ALBUTerol    90 MICROgram(s) HFA Inhaler 2 Puff(s) Inhalation every 6 hours PRN Shortness of Breath and/or Wheezing  morphine  - Injectable 4 milliGRAM(s) IV Push every 6 hours PRN breakthroug severe pain  ondansetron Injectable 4 milliGRAM(s) IV Push every 6 hours PRN Nausea and/or Vomiting  oxyCODONE    IR 5 milliGRAM(s) Oral every 6 hours PRN Moderate Pain (4 - 6)

## 2019-08-17 NOTE — PROGRESS NOTE ADULT - ASSESSMENT
68f with 2 weeks of diarrhea and now with pain to the ruq with multiple stones rto the ruq, found to have a Pancreatic  head mass at OSH and obstructive jaundice, transferred for EUS. Pt, underwent EUS with biopsy on 8/12, which showed pancreatic adenocarinoma, she was also found to have a  CT chest concerning for metastatic disease, s/p IR biopsy on 8/15.  Pt. with uptrending Tbili, awaiting GI procedure 68f with 2 weeks of diarrhea and now with pain to the ruq with multiple stones rto the ruq, found to have a Pancreatic  head mass at OSH and obstructive jaundice, transferred for EUS. Pt, underwent EUS with biopsy on 8/12, which showed pancreatic adenocarinoma, she was also found to have a  CT chest concerning for metastatic disease, s/p IR biopsy on 8/15.  Pt. with elevated Tbili, awaiting GI procedure on 8/19

## 2019-08-18 LAB
ALBUMIN SERPL ELPH-MCNC: 3.1 G/DL — LOW (ref 3.3–5)
ALP SERPL-CCNC: 560 U/L — HIGH (ref 40–120)
ALT FLD-CCNC: 82 U/L — HIGH (ref 4–33)
ANION GAP SERPL CALC-SCNC: 10 MMO/L — SIGNIFICANT CHANGE UP (ref 7–14)
AST SERPL-CCNC: 85 U/L — HIGH (ref 4–32)
BILIRUB SERPL-MCNC: 8.1 MG/DL — HIGH (ref 0.2–1.2)
BUN SERPL-MCNC: 7 MG/DL — SIGNIFICANT CHANGE UP (ref 7–23)
CALCIUM SERPL-MCNC: 9.2 MG/DL — SIGNIFICANT CHANGE UP (ref 8.4–10.5)
CHLORIDE SERPL-SCNC: 99 MMOL/L — SIGNIFICANT CHANGE UP (ref 98–107)
CO2 SERPL-SCNC: 25 MMOL/L — SIGNIFICANT CHANGE UP (ref 22–31)
CREAT SERPL-MCNC: 0.59 MG/DL — SIGNIFICANT CHANGE UP (ref 0.5–1.3)
GLUCOSE SERPL-MCNC: 93 MG/DL — SIGNIFICANT CHANGE UP (ref 70–99)
HCT VFR BLD CALC: 28.4 % — LOW (ref 34.5–45)
HGB BLD-MCNC: 9.7 G/DL — LOW (ref 11.5–15.5)
MAGNESIUM SERPL-MCNC: 1.6 MG/DL — SIGNIFICANT CHANGE UP (ref 1.6–2.6)
MCHC RBC-ENTMCNC: 27.6 PG — SIGNIFICANT CHANGE UP (ref 27–34)
MCHC RBC-ENTMCNC: 34.2 % — SIGNIFICANT CHANGE UP (ref 32–36)
MCV RBC AUTO: 80.7 FL — SIGNIFICANT CHANGE UP (ref 80–100)
NRBC # FLD: 0 K/UL — SIGNIFICANT CHANGE UP (ref 0–0)
PHOSPHATE SERPL-MCNC: 3.8 MG/DL — SIGNIFICANT CHANGE UP (ref 2.5–4.5)
PLATELET # BLD AUTO: 301 K/UL — SIGNIFICANT CHANGE UP (ref 150–400)
PMV BLD: 10.5 FL — SIGNIFICANT CHANGE UP (ref 7–13)
POTASSIUM SERPL-MCNC: 4 MMOL/L — SIGNIFICANT CHANGE UP (ref 3.5–5.3)
POTASSIUM SERPL-SCNC: 4 MMOL/L — SIGNIFICANT CHANGE UP (ref 3.5–5.3)
PROT SERPL-MCNC: 6.1 G/DL — SIGNIFICANT CHANGE UP (ref 6–8.3)
RBC # BLD: 3.52 M/UL — LOW (ref 3.8–5.2)
RBC # FLD: 18 % — HIGH (ref 10.3–14.5)
SODIUM SERPL-SCNC: 134 MMOL/L — LOW (ref 135–145)
WBC # BLD: 4.67 K/UL — SIGNIFICANT CHANGE UP (ref 3.8–10.5)
WBC # FLD AUTO: 4.67 K/UL — SIGNIFICANT CHANGE UP (ref 3.8–10.5)

## 2019-08-18 PROCEDURE — 99232 SBSQ HOSP IP/OBS MODERATE 35: CPT

## 2019-08-18 RX ADMIN — AMLODIPINE BESYLATE 10 MILLIGRAM(S): 2.5 TABLET ORAL at 06:43

## 2019-08-18 RX ADMIN — Medication 1 TABLET(S): at 11:17

## 2019-08-18 RX ADMIN — OXYCODONE HYDROCHLORIDE 5 MILLIGRAM(S): 5 TABLET ORAL at 12:10

## 2019-08-18 RX ADMIN — OXYCODONE HYDROCHLORIDE 5 MILLIGRAM(S): 5 TABLET ORAL at 11:17

## 2019-08-18 NOTE — PROGRESS NOTE ADULT - ASSESSMENT
68f with 2 weeks of diarrhea and now with pain to the ruq with multiple stones rto the ruq, found to have a Pancreatic  head mass at OSH and obstructive jaundice, transferred for EUS. Pt, underwent EUS with biopsy on 8/12, which showed pancreatic adenocarinoma, she was also found to have a  CT chest concerning for metastatic disease, s/p IR biopsy on 8/15.  Pt. with uptrending  Tbili, now at 8.3  awaiting GI procedure on 8/19 likely ERCP with stent.

## 2019-08-18 NOTE — PROGRESS NOTE ADULT - PROBLEM SELECTOR PLAN 1
Pancreatic  head mass found at OSH, transferred for EUS- malignant duodenal head mass and pancreatic head mass - confirmed pancreatic adenocarcinoma   -CT chest concerning for metastatic disease - s/p biopsy on 8/15  - surg-oncology eval following, appreciate recs

## 2019-08-18 NOTE — PROGRESS NOTE ADULT - SUBJECTIVE AND OBJECTIVE BOX
SUBJECTIVE: No CP or SOB      ALBUTerol    90 MICROgram(s) HFA Inhaler 2 Puff(s) Inhalation every 6 hours PRN  amLODIPine   Tablet 10 milliGRAM(s) Oral daily  ergocalciferol 10076 Unit(s) Oral <User Schedule>  morphine  - Injectable 4 milliGRAM(s) IV Push every 6 hours PRN  multivitamin 1 Tablet(s) Oral daily  ondansetron Injectable 4 milliGRAM(s) IV Push every 6 hours PRN  oxyCODONE    IR 5 milliGRAM(s) Oral every 6 hours PRN  polyethylene glycol 3350 17 Gram(s) Oral daily                       9.7    4.67  )-----------( 301      ( 18 Aug 2019 08:45 )             28.4     Hemoglobin: 9.7 g/dL (08-18 @ 08:45)  Hemoglobin: 9.4 g/dL (08-17 @ 06:15)  Hemoglobin: 9.9 g/dL (08-16 @ 06:30)  Hemoglobin: 10.8 g/dL (08-15 @ 06:00)  Hemoglobin: 10.1 g/dL (08-14 @ 05:26)    08-18    134<L>  |  99  |  7   ----------------------------<  93  4.0   |  25  |  0.59    Ca    9.2      18 Aug 2019 06:30  Phos  3.8     08-18  Mg     1.6     08-18    TPro  6.1  /  Alb  3.1<L>  /  TBili  8.1<H>  /  DBili  x   /  AST  85<H>  /  ALT  82<H>  /  AlkPhos  560<H>  08-18    Creatinine Trend: 0.59<--, 0.54<--, 0.72<--, 0.55<--, 0.54<--, 0.53<--    COAGS:     T(C): 37.1 (08-18-19 @ 06:42), Max: 37.3 (08-17-19 @ 15:00)  HR: 70 (08-18-19 @ 06:42) (64 - 71)  BP: 116/84 (08-18-19 @ 06:42) (106/53 - 116/84)  RR: 18 (08-18-19 @ 06:42) (18 - 20)  SpO2: 100% (08-18-19 @ 06:42) (97% - 100%)  Wt(kg): --    I&O's Summary    Cardiovascular:  S1S2 RRR, No JVD  Respiratory: diminished at bases, normal effort  Gastrointestinal: Abdomen soft, ND, NT, +BS  Skin: Warm, dry, intact. No rash.  Musculoskeletal: Normal ROM, normal strength  Ext: No C/C/E B/L LE    DIAGNOSTIC DATA  EKG: NSR    Echo pending    Echo: Done as outpatient on 6/29/18 (full result placed in physical chart)  IMPRESSION: Normal LV size and systolic function. Estimated LVEF 60%. LV wall motion abnormality - hypokinesis of basal and inferoseptal segments as well as entire inferior wall. Remaining segments with normal wall motion. Grade 1 diastolic dysfunction. Normal RV size and function.    NST: Done in our office on 7/9/18 (full result placed in physical chart)     CONCLUSIONS: Normal Study .   Normal myocardial perfusion SPECT images.   Normal left ventricular size and function. Calculated EF is 63%.     ASSESSMENT AND PLAN:  Patient is a 68 year old female known to our office (Cardiologist - Dr. Cash) with PMHx of HTN, former smoker, anemia, COPD (with inhaler use per patient), PVD s/p RLE bypass in 2018 who had an echo with normal LV/RV function and a normal nuclear stress test in our office last year as preop for LE bypass now transferred from [facility] for EUS/biopsy of pancreatic mass. And s/p IR Lung Biopsy.  Cardiology consulted for preop clearance for possible surgery.    - No evidence of clinical HF or anginal symptoms  - Previous outpatient echo with normal LVEF and normal nuclear stress test noted above from last year  - Repeat echo to re-eval LV function  --s/p Lung Bx, f/u per heme/onc   - ERCP planned for tomorrow, f/u primary team   -appreciate heme following; OP follow up with [facility] on d/c

## 2019-08-18 NOTE — PROGRESS NOTE ADULT - SUBJECTIVE AND OBJECTIVE BOX
Patient is a 68y old  Female who presents with a chief complaint of Pancreatic mass, Obstructive cholestatic liver disease, Urinary tract infection without Hematuria, Hypokalemia, PVD, Essential Hypertension. (17 Aug 2019 23:11)      SUBJECTIVE / OVERNIGHT EVENTS:    Review of Systems:     MEDICATIONS  (STANDING):  amLODIPine   Tablet 10 milliGRAM(s) Oral daily  enoxaparin Injectable 40 milliGRAM(s) SubCutaneous daily  ergocalciferol 17183 Unit(s) Oral <User Schedule>  multivitamin 1 Tablet(s) Oral daily  polyethylene glycol 3350 17 Gram(s) Oral daily    MEDICATIONS  (PRN):  ALBUTerol    90 MICROgram(s) HFA Inhaler 2 Puff(s) Inhalation every 6 hours PRN Shortness of Breath and/or Wheezing  morphine  - Injectable 4 milliGRAM(s) IV Push every 6 hours PRN breakthroug severe pain  ondansetron Injectable 4 milliGRAM(s) IV Push every 6 hours PRN Nausea and/or Vomiting  oxyCODONE    IR 5 milliGRAM(s) Oral every 6 hours PRN Moderate Pain (4 - 6)      PHYSICAL EXAM:  T(C): 37.1 (08-18-19 @ 06:42), Max: 37.3 (08-17-19 @ 15:00)  HR: 70 (08-18-19 @ 06:42) (64 - 71)  BP: 116/84 (08-18-19 @ 06:42) (106/53 - 116/84)  RR: 18 (08-18-19 @ 06:42) (18 - 20)  SpO2: 100% (08-18-19 @ 06:42) (97% - 100%)  I&O's Summary    GENERAL: NAD, well-developed  HEAD:  Atraumatic, Normocephalic    NECK: Supple, No elevated JVD  CHEST/LUNG: Clear to auscultation bilaterally; No wheeze  HEART: Regular rate and rhythm; No murmurs, rubs, or gallops  ABDOMEN: Soft, Nontender, Nondistended; Bowel sounds present  EXTREMITIES:  2+ Peripheral Pulses, No clubbing, cyanosis, or edema  PSYCH: AAOx3  NEUROLOGY: CN II-XII grossly intact, moving all extremities  SKIN: Jaundiced   LABS:  CAPILLARY BLOOD GLUCOSE                              9.4    5.39  )-----------( 301      ( 17 Aug 2019 06:15 )             27.9     08-18    134<L>  |  99  |  7   ----------------------------<  93  4.0   |  25  |  0.59    Ca    9.2      18 Aug 2019 06:30  Phos  3.8     08-18  Mg     1.6     08-18    TPro  6.1  /  Alb  3.1<L>  /  TBili  8.1<H>  /  DBili  x   /  AST  85<H>  /  ALT  82<H>  /  AlkPhos  560<H>  08-18              RADIOLOGY & ADDITIONAL TESTS:    Telemetry Personally Reviewed -     Imaging Personally Reviewed -     Imaging Reviewed -     Consultant(s) Notes Reviewed -       Care Discussed with Consultants/Other Providers - Patient is a 68y old  Female who presents with a chief complaint of Pancreatic mass, Obstructive cholestatic liver disease, Urinary tract infection without Hematuria, Hypokalemia, PVD, Essential Hypertension. (17 Aug 2019 23:11)      SUBJECTIVE / OVERNIGHT EVENTS: No significant events overnight. This am, with abdominal pain, no N/V or fevers.     Review of Systems:     MEDICATIONS  (STANDING):  amLODIPine   Tablet 10 milliGRAM(s) Oral daily  enoxaparin Injectable 40 milliGRAM(s) SubCutaneous daily  ergocalciferol 85826 Unit(s) Oral <User Schedule>  multivitamin 1 Tablet(s) Oral daily  polyethylene glycol 3350 17 Gram(s) Oral daily    MEDICATIONS  (PRN):  ALBUTerol    90 MICROgram(s) HFA Inhaler 2 Puff(s) Inhalation every 6 hours PRN Shortness of Breath and/or Wheezing  morphine  - Injectable 4 milliGRAM(s) IV Push every 6 hours PRN breakthroug severe pain  ondansetron Injectable 4 milliGRAM(s) IV Push every 6 hours PRN Nausea and/or Vomiting  oxyCODONE    IR 5 milliGRAM(s) Oral every 6 hours PRN Moderate Pain (4 - 6)      PHYSICAL EXAM:  T(C): 37.1 (08-18-19 @ 06:42), Max: 37.3 (08-17-19 @ 15:00)  HR: 70 (08-18-19 @ 06:42) (64 - 71)  BP: 116/84 (08-18-19 @ 06:42) (106/53 - 116/84)  RR: 18 (08-18-19 @ 06:42) (18 - 20)  SpO2: 100% (08-18-19 @ 06:42) (97% - 100%)  I&O's Summary    GENERAL: NAD, well-developed  HEAD:  Atraumatic, Normocephalic  NECK: Supple, No elevated JVD  CHEST/LUNG: Clear to auscultation bilaterally; No wheeze  HEART: Regular rate and rhythm; No murmurs, rubs, or gallops  ABDOMEN: Soft, Nontender, Nondistended; Bowel sounds present  EXTREMITIES:  2+ Peripheral Pulses, No clubbing, cyanosis, or edema  PSYCH: AAOx3  NEUROLOGY: CN II-XII grossly intact, moving all extremities  SKIN: Jaundiced   LABS:  CAPILLARY BLOOD GLUCOSE                              9.4    5.39  )-----------( 301      ( 17 Aug 2019 06:15 )             27.9     08-18    134<L>  |  99  |  7   ----------------------------<  93  4.0   |  25  |  0.59    Ca    9.2      18 Aug 2019 06:30  Phos  3.8     08-18  Mg     1.6     08-18    TPro  6.1  /  Alb  3.1<L>  /  TBili  8.1<H>  /  DBili  x   /  AST  85<H>  /  ALT  82<H>  /  AlkPhos  560<H>  08-18              RADIOLOGY & ADDITIONAL TESTS:    Telemetry Personally Reviewed -     Imaging Personally Reviewed -     Imaging Reviewed -     Consultant(s) Notes Reviewed -   Surgery     Care Discussed with Consultants/Other Providers -

## 2019-08-19 ENCOUNTER — OUTPATIENT (OUTPATIENT)
Dept: OUTPATIENT SERVICES | Facility: HOSPITAL | Age: 69
LOS: 1 days | Discharge: HOME | End: 2019-08-19

## 2019-08-19 DIAGNOSIS — C25.9 MALIGNANT NEOPLASM OF PANCREAS, UNSPECIFIED: ICD-10-CM

## 2019-08-19 DIAGNOSIS — Z95.828 PRESENCE OF OTHER VASCULAR IMPLANTS AND GRAFTS: Chronic | ICD-10-CM

## 2019-08-19 DIAGNOSIS — Z98.891 HISTORY OF UTERINE SCAR FROM PREVIOUS SURGERY: Chronic | ICD-10-CM

## 2019-08-19 DIAGNOSIS — Z98.890 OTHER SPECIFIED POSTPROCEDURAL STATES: Chronic | ICD-10-CM

## 2019-08-19 LAB
ALBUMIN SERPL ELPH-MCNC: 2.9 G/DL — LOW (ref 3.3–5)
ALP SERPL-CCNC: 578 U/L — HIGH (ref 40–120)
ALT FLD-CCNC: 80 U/L — HIGH (ref 4–33)
ANION GAP SERPL CALC-SCNC: 11 MMO/L — SIGNIFICANT CHANGE UP (ref 7–14)
AST SERPL-CCNC: 76 U/L — HIGH (ref 4–32)
BILIRUB SERPL-MCNC: 8.8 MG/DL — HIGH (ref 0.2–1.2)
BUN SERPL-MCNC: 7 MG/DL — SIGNIFICANT CHANGE UP (ref 7–23)
CALCIUM SERPL-MCNC: 9.4 MG/DL — SIGNIFICANT CHANGE UP (ref 8.4–10.5)
CHLORIDE SERPL-SCNC: 99 MMOL/L — SIGNIFICANT CHANGE UP (ref 98–107)
CO2 SERPL-SCNC: 25 MMOL/L — SIGNIFICANT CHANGE UP (ref 22–31)
CREAT SERPL-MCNC: 0.58 MG/DL — SIGNIFICANT CHANGE UP (ref 0.5–1.3)
GLUCOSE SERPL-MCNC: 107 MG/DL — HIGH (ref 70–99)
HCT VFR BLD CALC: 29 % — LOW (ref 34.5–45)
HGB BLD-MCNC: 9.9 G/DL — LOW (ref 11.5–15.5)
INR BLD: 1.08 — SIGNIFICANT CHANGE UP (ref 0.88–1.17)
MAGNESIUM SERPL-MCNC: 1.8 MG/DL — SIGNIFICANT CHANGE UP (ref 1.6–2.6)
MCHC RBC-ENTMCNC: 27.4 PG — SIGNIFICANT CHANGE UP (ref 27–34)
MCHC RBC-ENTMCNC: 34.1 % — SIGNIFICANT CHANGE UP (ref 32–36)
MCV RBC AUTO: 80.3 FL — SIGNIFICANT CHANGE UP (ref 80–100)
NRBC # FLD: 0 K/UL — SIGNIFICANT CHANGE UP (ref 0–0)
PHOSPHATE SERPL-MCNC: 3.8 MG/DL — SIGNIFICANT CHANGE UP (ref 2.5–4.5)
PLATELET # BLD AUTO: 322 K/UL — SIGNIFICANT CHANGE UP (ref 150–400)
PMV BLD: 9.9 FL — SIGNIFICANT CHANGE UP (ref 7–13)
POTASSIUM SERPL-MCNC: 4.8 MMOL/L — SIGNIFICANT CHANGE UP (ref 3.5–5.3)
POTASSIUM SERPL-SCNC: 4.8 MMOL/L — SIGNIFICANT CHANGE UP (ref 3.5–5.3)
PROT SERPL-MCNC: 6.4 G/DL — SIGNIFICANT CHANGE UP (ref 6–8.3)
PROTHROM AB SERPL-ACNC: 12 SEC — SIGNIFICANT CHANGE UP (ref 9.8–13.1)
RBC # BLD: 3.61 M/UL — LOW (ref 3.8–5.2)
RBC # FLD: 18 % — HIGH (ref 10.3–14.5)
SODIUM SERPL-SCNC: 135 MMOL/L — SIGNIFICANT CHANGE UP (ref 135–145)
WBC # BLD: 5.1 K/UL — SIGNIFICANT CHANGE UP (ref 3.8–10.5)
WBC # FLD AUTO: 5.1 K/UL — SIGNIFICANT CHANGE UP (ref 3.8–10.5)

## 2019-08-19 PROCEDURE — 43239 EGD BIOPSY SINGLE/MULTIPLE: CPT | Mod: 59,GC

## 2019-08-19 PROCEDURE — 43261 ENDO CHOLANGIOPANCREATOGRAPH: CPT | Mod: GC

## 2019-08-19 PROCEDURE — 47540 PERQ PLMT BILE DUCT STENT: CPT

## 2019-08-19 PROCEDURE — 43259 EGD US EXAM DUODENUM/JEJUNUM: CPT | Mod: GC

## 2019-08-19 PROCEDURE — 74177 CT ABD & PELVIS W/CONTRAST: CPT | Mod: 26

## 2019-08-19 PROCEDURE — 99233 SBSQ HOSP IP/OBS HIGH 50: CPT

## 2019-08-19 RX ORDER — SODIUM CHLORIDE 9 MG/ML
1000 INJECTION INTRAMUSCULAR; INTRAVENOUS; SUBCUTANEOUS
Refills: 0 | Status: DISCONTINUED | OUTPATIENT
Start: 2019-08-19 | End: 2019-08-23

## 2019-08-19 RX ORDER — CIPROFLOXACIN LACTATE 400MG/40ML
400 VIAL (ML) INTRAVENOUS ONCE
Refills: 0 | Status: COMPLETED | OUTPATIENT
Start: 2019-08-19 | End: 2019-08-19

## 2019-08-19 RX ORDER — PIPERACILLIN AND TAZOBACTAM 4; .5 G/20ML; G/20ML
3.38 INJECTION, POWDER, LYOPHILIZED, FOR SOLUTION INTRAVENOUS ONCE
Refills: 0 | Status: COMPLETED | OUTPATIENT
Start: 2019-08-19 | End: 2019-08-19

## 2019-08-19 RX ORDER — HYDROMORPHONE HYDROCHLORIDE 2 MG/ML
0.5 INJECTION INTRAMUSCULAR; INTRAVENOUS; SUBCUTANEOUS ONCE
Refills: 0 | Status: DISCONTINUED | OUTPATIENT
Start: 2019-08-19 | End: 2019-08-19

## 2019-08-19 RX ORDER — ONDANSETRON 8 MG/1
4 TABLET, FILM COATED ORAL ONCE
Refills: 0 | Status: COMPLETED | OUTPATIENT
Start: 2019-08-19 | End: 2019-08-19

## 2019-08-19 RX ORDER — INDOMETHACIN 50 MG
100 CAPSULE ORAL ONCE
Refills: 0 | Status: COMPLETED | OUTPATIENT
Start: 2019-08-19 | End: 2019-08-19

## 2019-08-19 RX ORDER — PIPERACILLIN AND TAZOBACTAM 4; .5 G/20ML; G/20ML
3.38 INJECTION, POWDER, LYOPHILIZED, FOR SOLUTION INTRAVENOUS EVERY 8 HOURS
Refills: 0 | Status: DISCONTINUED | OUTPATIENT
Start: 2019-08-19 | End: 2019-08-23

## 2019-08-19 RX ORDER — SODIUM CHLORIDE 9 MG/ML
1000 INJECTION, SOLUTION INTRAVENOUS ONCE
Refills: 0 | Status: COMPLETED | OUTPATIENT
Start: 2019-08-19 | End: 2019-08-19

## 2019-08-19 RX ADMIN — HYDROMORPHONE HYDROCHLORIDE 0.5 MILLIGRAM(S): 2 INJECTION INTRAMUSCULAR; INTRAVENOUS; SUBCUTANEOUS at 13:00

## 2019-08-19 RX ADMIN — AMLODIPINE BESYLATE 10 MILLIGRAM(S): 2.5 TABLET ORAL at 08:13

## 2019-08-19 RX ADMIN — MORPHINE SULFATE 4 MILLIGRAM(S): 50 CAPSULE, EXTENDED RELEASE ORAL at 23:01

## 2019-08-19 RX ADMIN — HYDROMORPHONE HYDROCHLORIDE 0.5 MILLIGRAM(S): 2 INJECTION INTRAMUSCULAR; INTRAVENOUS; SUBCUTANEOUS at 13:14

## 2019-08-19 RX ADMIN — OXYCODONE HYDROCHLORIDE 5 MILLIGRAM(S): 5 TABLET ORAL at 14:56

## 2019-08-19 RX ADMIN — SODIUM CHLORIDE 75 MILLILITER(S): 9 INJECTION INTRAMUSCULAR; INTRAVENOUS; SUBCUTANEOUS at 16:34

## 2019-08-19 RX ADMIN — PIPERACILLIN AND TAZOBACTAM 200 GRAM(S): 4; .5 INJECTION, POWDER, LYOPHILIZED, FOR SOLUTION INTRAVENOUS at 16:39

## 2019-08-19 RX ADMIN — OXYCODONE HYDROCHLORIDE 5 MILLIGRAM(S): 5 TABLET ORAL at 15:50

## 2019-08-19 RX ADMIN — ONDANSETRON 4 MILLIGRAM(S): 8 TABLET, FILM COATED ORAL at 16:30

## 2019-08-19 RX ADMIN — SODIUM CHLORIDE 75 MILLILITER(S): 9 INJECTION INTRAMUSCULAR; INTRAVENOUS; SUBCUTANEOUS at 08:30

## 2019-08-19 RX ADMIN — PIPERACILLIN AND TAZOBACTAM 25 GRAM(S): 4; .5 INJECTION, POWDER, LYOPHILIZED, FOR SOLUTION INTRAVENOUS at 22:47

## 2019-08-19 RX ADMIN — Medication 100 MILLIGRAM(S): at 09:21

## 2019-08-19 RX ADMIN — Medication 200 MILLIGRAM(S): at 09:20

## 2019-08-19 RX ADMIN — SODIUM CHLORIDE 1000 MILLILITER(S): 9 INJECTION, SOLUTION INTRAVENOUS at 09:24

## 2019-08-19 RX ADMIN — ONDANSETRON 4 MILLIGRAM(S): 8 TABLET, FILM COATED ORAL at 21:00

## 2019-08-19 RX ADMIN — MORPHINE SULFATE 4 MILLIGRAM(S): 50 CAPSULE, EXTENDED RELEASE ORAL at 22:46

## 2019-08-19 NOTE — CHART NOTE - NSCHARTNOTEFT_GEN_A_CORE
Advanced GI Update    Patient underwent ERCP today for attempt at biliary stent placement for rising bili.  Please see note in [place] for procedure details.  In brief, ampullary lesions precluded direct cannulation of the CBD so EUS-guided rendezvous procedure attempted.  Needle entry into CBD through duodenum was successful with cholangiogram and wire passage into ampulla, however mass-like obstruction precluded advancement into the duodenum and cannulation was ultimately not able to be achieved.  During procedure, IV Cipro and Indocin DC administered.    Case reviewed with IR and they were consulted for percutaneous biliary drainage given persistent obstruction and breach of CBD sterility.  Post-procedure in PACU, patient hemodynamically stable but complaining of significant abdominal pain to palpation.  Abdomen soft but +gaurding and +rebound. Concern for bile leakage into peritoneum.  Stat CT abdomen with IV contrast ordered.  If patient decompensates will need IR drainage today.      Recs  STAT CT abdomen with contrast  IV antibiotics  NPO  Pain control    Plan discussed with patient, IR attending Dr. Patel and primary team Advanced GI Update    Patient underwent ERCP today for attempt at biliary stent placement for rising bili.  Please see note in [place] for procedure details.  In brief, ampullary lesions precluded direct cannulation of the CBD so EUS-guided rendezvous procedure attempted.  Needle entry into CBD through duodenum was successful with cholangiogram and wire passage into ampulla, however mass-like obstruction precluded advancement into the duodenum and cannulation was ultimately not able to be achieved.  During procedure, IV Cipro and Indocin PA administered.    Case reviewed with IR and they were consulted for percutaneous biliary drainage given persistent obstruction and breach of CBD sterility.  Post-procedure in PACU, patient hemodynamically stable but complaining of significant abdominal pain to palpation.  Abdomen soft but +gaurding and +rebound. Concern for bile leakage into peritoneum.  Stat CT abdomen with IV contrast ordered.  If patient decompensates will need IR drainage today.      Recs  STAT CT abdomen with contrast  IV antibiotics  NPO  Pain control    Plan discussed with patient, IR attending Dr. Patel and primary team    Addendum:  CT obtained, reviewed with abdominal radiology and IR; confirmed bile leak/periduodenal inflammation, fluid +/- hemorrhage.  No free air.  Patient hemodynamically stable, mentating normally, but clammy to touch and noting abdominal pain and nausea/vomiting, but pain responsive to medication.  Feel that urgent intervention needed for adequate bile drainage in setting of bile peritonitis.    Consider MICU evaluation for higher level of care; currently stable but at risk for decompensation.  Discussed with patient, patient's daughter Allyson ([phone]), the IR team, and the ADS team.

## 2019-08-19 NOTE — PROGRESS NOTE ADULT - ATTENDING COMMENTS
ercp with stent today? ercp with stent today?  bili 8.8 today ercp   bili 8.8 today  Tbili, now at 8.8  s/p GI procedure on 8/19 likely ERCP with stent.   L arm iv pain- iv change by rn and iv pain meds- observe ercp ?  bili 8.8 today   s/p GI procedure on 8/19 for stent- attempted with no succes- will need help from IR   L arm iv pain- iv change by rn and iv pain meds- observe ercp ?  bili 8.8 today   s/p GI procedure on 8/19 for stent- attempted with no succes- will need help from IR   L arm iv pain- iv change by rn and iv pain meds- observe  start Zosyn

## 2019-08-19 NOTE — CHART NOTE - NSCHARTNOTEFT_GEN_A_CORE
Patient Age: 69 y/o    Patient Gender: Female    Procedure (including site/side if known): Placement of Biliary drain    Diagnosis/Indication: Pancreatic mass w/ obstructive Jaundice    Interventional Radiology Attending Physician: Dr. Santacruz     Ordering Attending Physician: Dr. Gunter    Pertinent medical history: HTN, Anemia, smoker, PVD, RLE claudication  s/p RLE stent    Pertinent Labs:08-19    135  |  99  |  7   ----------------------------<  107<H>  4.8   |  25  |  0.58    Ca    9.4      19 Aug 2019 06:05  Phos  3.8     08-19  Mg     1.8     08-19    TPro  6.4  /  Alb  2.9<L>  /  TBili  8.8<H>  /  DBili  x   /  AST  76<H>  /  ALT  80<H>  /  AlkPhos  578<H>  08-19                        9.9    5.10  )-----------( 322      ( 19 Aug 2019 06:05 )             29.0   PT/INR - ( 19 Aug 2019 06:05 )   PT: 12.0 SEC;   INR: 1.08          Patient and Family aware? Yes      Attending/Resident/NP/PA: Meera Daley NP-C    Contact:      [phone]                         Date:            8/19/19                        time:[id number]

## 2019-08-19 NOTE — PROGRESS NOTE ADULT - ASSESSMENT
69 yo F with 2 weeks of diarrhea and RUQ pain, found to have a pancreatic head mass and obstructive jaundice at OSH, transfered to [facility] for EUS. EUS with biopsy on 8/12 showed pancreatic adenocarinoma. CT chest concerning for metastatic disease, s/p IR biopsy on 8/15.  Pt. with elevated Tbili, awaiting GI procedure on 8/19.    Plan:  - Rec GI procedure, ERCP  - Surgical plan pending path and ERCP  - Remainder of care per primary team    D Team Surgery #[id number]

## 2019-08-19 NOTE — PROGRESS NOTE ADULT - PROBLEM SELECTOR PLAN 2
likely in setting of pancreatic mass,   Eus 8/12- with pancreatic adenocarcinoma   - tentative plan for biliary stent on 8/19, given elevated bilirubin of 8.3

## 2019-08-19 NOTE — PROGRESS NOTE ADULT - ATTENDING COMMENTS
Agree with above   further cardiac workup pending clinical course and surgical plan    Maximiliano Sarmiento MD

## 2019-08-19 NOTE — PROGRESS NOTE ADULT - SUBJECTIVE AND OBJECTIVE BOX
Surgery Progress Note    S: Patient seen and examined. No acute events overnight.     O:  Physical Exam:  Gen: Laying in bed, NAD  HEENT: atrumatic, EMOI  Resp: Unlabored breathing  Abd: soft, enrique-incisional tenderness, nondistended, no rebound or guarding. Drains serosanguinous   Ext: Moves 4 extremities spontaneously    Vital Signs Last 24 Hrs  T(C): 36.7 (19 Aug 2019 05:56), Max: 36.8 (18 Aug 2019 20:23)  T(F): 98 (19 Aug 2019 05:56), Max: 98.3 (18 Aug 2019 20:23)  HR: 61 (19 Aug 2019 05:56) (61 - 69)  BP: 133/64 (19 Aug 2019 05:56) (114/63 - 133/64)  BP(mean): --  RR: 18 (19 Aug 2019 05:56) (18 - 20)  SpO2: 99% (19 Aug 2019 05:56) (99% - 100%)    I&O's Detail                            9.9    5.10  )-----------( 322      ( 19 Aug 2019 06:05 )             29.0       08-19    135  |  99  |  7   ----------------------------<  107<H>  4.8   |  25  |  0.58    Ca    9.4      19 Aug 2019 06:05  Phos  3.8     08-19  Mg     1.8     08-19    TPro  6.4  /  Alb  2.9<L>  /  TBili  8.8<H>  /  DBili  x   /  AST  76<H>  /  ALT  80<H>  /  AlkPhos  578<H>  08-19 Surgery Progress Note    S: Patient seen and examined. No acute events overnight.     O:  Physical Exam:  Gen: Laying in bed, NAD  HEENT: atrumatic, EMOI  Resp: Unlabored breathing  Ext: Moves 4 extremities spontaneously    Vital Signs Last 24 Hrs  T(C): 36.7 (19 Aug 2019 05:56), Max: 36.8 (18 Aug 2019 20:23)  T(F): 98 (19 Aug 2019 05:56), Max: 98.3 (18 Aug 2019 20:23)  HR: 61 (19 Aug 2019 05:56) (61 - 69)  BP: 133/64 (19 Aug 2019 05:56) (114/63 - 133/64)  BP(mean): --  RR: 18 (19 Aug 2019 05:56) (18 - 20)  SpO2: 99% (19 Aug 2019 05:56) (99% - 100%)    I&O's Detail                            9.9    5.10  )-----------( 322      ( 19 Aug 2019 06:05 )             29.0       08-19    135  |  99  |  7   ----------------------------<  107<H>  4.8   |  25  |  0.58    Ca    9.4      19 Aug 2019 06:05  Phos  3.8     08-19  Mg     1.8     08-19    TPro  6.4  /  Alb  2.9<L>  /  TBili  8.8<H>  /  DBili  x   /  AST  76<H>  /  ALT  80<H>  /  AlkPhos  578<H>  08-19

## 2019-08-19 NOTE — CHART NOTE - NSCHARTNOTEFT_GEN_A_CORE
RDN with multiple attempts to see patient for nutrition follow-up. However, patient off unit for procedure. Will reattempt to see patient as able. RDN remains available, staff made aware.

## 2019-08-19 NOTE — PROGRESS NOTE ADULT - SUBJECTIVE AND OBJECTIVE BOX
Vascular & Interventional Radiology Post-Procedure Note    Pre-Procedure Diagnosis: Biliary Obstruction  Post-Procedure Diagnosis: Same as pre.  Indications for Procedure: Peritonitis and biliary leak after Endoscopic stenting attempt      : Tony MESA, Noam MESA      Procedure Details/Findings: Successful placement of a 13s83mt CBD stent and 10.2F internal/external biliary drainage catheter via segment 3 hepatic duct.     Complications: None  Estimated Blood Loss: Minimal  Specimen: Sent for C/S  Contrast: See Report  Sedation: GA  Patient Condition/Disposition: PACU then Floor. Stable.    Plan:     Will bring patient back for biliary stent evaluation with possible angioplasty or stent extension. Please place order under .

## 2019-08-19 NOTE — PROGRESS NOTE ADULT - SUBJECTIVE AND OBJECTIVE BOX
Patient is a 68y old  Female who presents with a chief complaint of Pancreatic mass, Obstructive cholestatic liver disease, Urinary tract infection without Hematuria, Hypokalemia, PVD, Essential Hypertension. (19 Aug 2019 08:53)      SUBJECTIVE / OVERNIGHT EVENTS:  patient seen after procedure.  noted pain to L arm at iv site.        MEDICATIONS  (STANDING):  amLODIPine   Tablet 10 milliGRAM(s) Oral daily  ergocalciferol 84740 Unit(s) Oral <User Schedule>  multivitamin 1 Tablet(s) Oral daily  polyethylene glycol 3350 17 Gram(s) Oral daily  sodium chloride 0.9%. 1000 milliLiter(s) (75 mL/Hr) IV Continuous <Continuous>    MEDICATIONS  (PRN):  ALBUTerol    90 MICROgram(s) HFA Inhaler 2 Puff(s) Inhalation every 6 hours PRN Shortness of Breath and/or Wheezing  morphine  - Injectable 4 milliGRAM(s) IV Push every 6 hours PRN breakthroug severe pain  ondansetron Injectable 4 milliGRAM(s) IV Push every 6 hours PRN Nausea and/or Vomiting  oxyCODONE    IR 5 milliGRAM(s) Oral every 6 hours PRN Moderate Pain (4 - 6)      Vital Signs Last 24 Hrs  T(C): 36.7 (19 Aug 2019 05:56), Max: 36.8 (18 Aug 2019 20:23)  T(F): 98 (19 Aug 2019 05:56), Max: 98.3 (18 Aug 2019 20:23)  HR: 61 (19 Aug 2019 05:56) (61 - 69)  BP: 133/64 (19 Aug 2019 05:56) (114/63 - 133/64)  BP(mean): --  RR: 18 (19 Aug 2019 05:56) (18 - 20)  SpO2: 99% (19 Aug 2019 05:56) (99% - 100%)      PHYSICAL EXAM:  GENERAL: NAD, well-developed  HEAD:  Atraumatic, Normocephalic  EYES: EOMI, PERRLA, conjunctiva and sclera clear  NECK: Supple, No JVD  CHEST/LUNG: Clear to auscultation bilaterally; No wheeze  HEART: Regular rate and rhythm; No murmurs, rubs, or gallops  ABDOMEN: Soft, Nontender, Nondistended; Bowel sounds present  EXTREMITIES:  2+ Peripheral Pulses, No clubbing, cyanosis, or edema  L arm with iv  PSYCH: AAOx3  NEUROLOGY: non-focal  SKIN: jaundice skin    LABS:                        9.9    5.10  )-----------( 322      ( 19 Aug 2019 06:05 )             29.0     08-19    135  |  99  |  7   ----------------------------<  107<H>  4.8   |  25  |  0.58    Ca    9.4      19 Aug 2019 06:05  Phos  3.8     08-19  Mg     1.8     08-19    TPro  6.4  /  Alb  2.9<L>  /  TBili  8.8<H>  /  DBili  x   /  AST  76<H>  /  ALT  80<H>  /  AlkPhos  578<H>  08-19    PT/INR - ( 19 Aug 2019 06:05 )   PT: 12.0 SEC;   INR: 1.08                  Care Discussed with Consultants/Other Providers:  gi/onc

## 2019-08-19 NOTE — PROGRESS NOTE ADULT - ASSESSMENT
68f with 2 weeks of diarrhea and now with pain to the ruq with multiple stones rto the ruq, found to have a Pancreatic  head mass at OSH and obstructive jaundice, transferred for EUS. Pt, underwent EUS with biopsy on 8/12, which showed pancreatic adenocarinoma, she was also found to have a  CT chest concerning for metastatic disease, s/p IR biopsy on 8/15.  Pt. with uptrending    Tbili, now at 8.8  awaiting GI procedure on 8/19 likely ERCP with stent. 68f with 2 weeks of diarrhea and now with pain to the ruq with multiple stones rto the ruq, found to have a Pancreatic  head mass at OSH and obstructive jaundice, transferred for EUS. Pt, underwent EUS with biopsy on 8/12, which showed pancreatic adenocarinoma, she was also found to have a  CT chest concerning for metastatic disease, s/p IR biopsy on 8/15.  Pt. with uptrending    Tbili, now at 8.8  s/p GI procedure on 8/19 likely ERCP with stent.   L arm iv pain 68f with 2 weeks of diarrhea and now with pain to the ruq with multiple stones rto the ruq, found to have a Pancreatic  head mass at OSH and obstructive jaundice, transferred for EUS. Pt, underwent EUS with biopsy on 8/12, which showed pancreatic adenocarinoma, she was also found to have a  CT chest concerning for metastatic disease, s/p IR biopsy on 8/15.  Pt. with uptrending    Tbili, now at 8.8  s/p GI procedure on 8/19 for stent- attempted with no succes- will need help from IR   L arm iv pain

## 2019-08-19 NOTE — PROGRESS NOTE ADULT - SUBJECTIVE AND OBJECTIVE BOX
SUBJECTIVE: c/o mild abd pain      MEDICATIONS  (STANDING):  amLODIPine   Tablet 10 milliGRAM(s) Oral daily  ergocalciferol 18788 Unit(s) Oral <User Schedule>  multivitamin 1 Tablet(s) Oral daily  piperacillin/tazobactam IVPB.. 3.375 Gram(s) IV Intermittent every 8 hours  polyethylene glycol 3350 17 Gram(s) Oral daily  sodium chloride 0.9%. 1000 milliLiter(s) (75 mL/Hr) IV Continuous <Continuous>    MEDICATIONS  (PRN):  ALBUTerol    90 MICROgram(s) HFA Inhaler 2 Puff(s) Inhalation every 6 hours PRN Shortness of Breath and/or Wheezing  morphine  - Injectable 4 milliGRAM(s) IV Push every 6 hours PRN breakthroug severe pain  ondansetron Injectable 4 milliGRAM(s) IV Push every 6 hours PRN Nausea and/or Vomiting  oxyCODONE    IR 5 milliGRAM(s) Oral every 6 hours PRN Moderate Pain (4 - 6)      LABS:                      9.9    5.10  )-----------( 322      ( 19 Aug 2019 06:05 )             29.0     135  |  99  |  7   ----------------------------<  107<H>  4.8   |  25  |  0.58    Ca    9.4      19 Aug 2019 06:05  Phos  3.8     08-19  Mg     1.8     08-19    TPro  6.4  /  Alb  2.9<L>  /  TBili  8.8<H>  /  DBili  x   /  AST  76<H>  /  ALT  80<H>  /  AlkPhos  578<H>  08-19    Creatinine Trend: 0.58<--, 0.59<--, 0.54<--, 0.72<--, 0.55<--, 0.54<--   PT/INR - ( 19 Aug 2019 06:05 )   PT: 12.0 SEC;   INR: 1.08         PHYSICAL EXAM  Vital Signs Last 24 Hrs  T(C): 37 (19 Aug 2019 15:17), Max: 37 (19 Aug 2019 15:17)  T(F): 98.6 (19 Aug 2019 15:17), Max: 98.6 (19 Aug 2019 15:17)  HR: 96 (19 Aug 2019 15:17) (61 - 96)  BP: 130/60 (19 Aug 2019 15:17) (118/62 - 133/64)  RR: 18 (19 Aug 2019 15:17) (18 - 18)  SpO2: 94% (19 Aug 2019 15:17) (94% - 100%)    Cardiovascular:  S1S2 RRR, No JVD  Respiratory: diminished at bases, normal effort  Gastrointestinal: Abdomen soft, ND, NT, +BS  Skin: Warm, dry, intact. No rash.  Musculoskeletal: Normal ROM, normal strength  Ext: No C/C/E B/L LE    DIAGNOSTIC DATA  EKG: NSR    < from: Transthoracic Echocardiogram (08.16.19 @ 15:34) >  CONCLUSIONS:  1. Calcified trileaflet aortic valve with normal opening.  2. Normal left ventricular internal dimensions and wall  thicknesses.  3. Overall preserved left ventricular systolic function.  Basal inferior and basal inferoseptal wall hypokinesis.  4. Normal right ventricular size and function.  ------------------------------------------------------------------------  Confirmed on  8/16/2019 - 17:06:36 by Jason Dee M.D. RPVI    < end of copied text >      Echo: Done as outpatient on 6/29/18 (full result placed in physical chart)  IMPRESSION: Normal LV size and systolic function. Estimated LVEF 60%. LV wall motion abnormality - hypokinesis of basal and inferoseptal segments as well as entire inferior wall. Remaining segments with normal wall motion. Grade 1 diastolic dysfunction. Normal RV size and function.    NST: Done in our office on 7/9/18 (full result placed in physical chart)  CONCLUSIONS: Normal Study .   Normal myocardial perfusion SPECT images.   Normal left ventricular size and function. Calculated EF is 63%.     ASSESSMENT AND PLAN:  Patient is a 68 year old female known to our office (Cardiologist - Dr. Cash) with PMHx of HTN, former smoker, anemia, COPD (with inhaler use per patient), PVD s/p RLE bypass in 2018 who had an echo with normal LV/RV function and a normal nuclear stress test in our office last year as preop for LE bypass now transferred from [facility]- for EUS/biopsy of pancreatic mass. And s/p IR Lung Biopsy.  Cardiology consulted for preop clearance for possible surgery.    - No evidence of clinical HF or anginal symptoms  - Repeat echo noted/unchanged from prior  --s/p Lung Bx, f/u per heme/onc   --unable to complete ERCP, plan for IR eval for PTC placement

## 2019-08-20 ENCOUNTER — APPOINTMENT (OUTPATIENT)
Dept: HEMATOLOGY ONCOLOGY | Facility: CLINIC | Age: 69
End: 2019-08-20

## 2019-08-20 LAB
ALBUMIN SERPL ELPH-MCNC: 3.1 G/DL — LOW (ref 3.3–5)
ALP SERPL-CCNC: 519 U/L — HIGH (ref 40–120)
ALT FLD-CCNC: 72 U/L — HIGH (ref 4–33)
ANION GAP SERPL CALC-SCNC: 10 MMO/L — SIGNIFICANT CHANGE UP (ref 7–14)
AST SERPL-CCNC: 72 U/L — HIGH (ref 4–32)
BILIRUB SERPL-MCNC: 5.7 MG/DL — HIGH (ref 0.2–1.2)
BUN SERPL-MCNC: 6 MG/DL — LOW (ref 7–23)
CALCIUM SERPL-MCNC: 9.1 MG/DL — SIGNIFICANT CHANGE UP (ref 8.4–10.5)
CHLORIDE SERPL-SCNC: 102 MMOL/L — SIGNIFICANT CHANGE UP (ref 98–107)
CO2 SERPL-SCNC: 24 MMOL/L — SIGNIFICANT CHANGE UP (ref 22–31)
CREAT SERPL-MCNC: 0.58 MG/DL — SIGNIFICANT CHANGE UP (ref 0.5–1.3)
GLUCOSE SERPL-MCNC: 106 MG/DL — HIGH (ref 70–99)
GRAM STN WND: SIGNIFICANT CHANGE UP
HCT VFR BLD CALC: 30.1 % — LOW (ref 34.5–45)
HGB BLD-MCNC: 10 G/DL — LOW (ref 11.5–15.5)
MCHC RBC-ENTMCNC: 27.5 PG — SIGNIFICANT CHANGE UP (ref 27–34)
MCHC RBC-ENTMCNC: 33.2 % — SIGNIFICANT CHANGE UP (ref 32–36)
MCV RBC AUTO: 82.7 FL — SIGNIFICANT CHANGE UP (ref 80–100)
NON-GYNECOLOGICAL CYTOLOGY STUDY: SIGNIFICANT CHANGE UP
NRBC # FLD: 0.02 K/UL — SIGNIFICANT CHANGE UP (ref 0–0)
PLATELET # BLD AUTO: 314 K/UL — SIGNIFICANT CHANGE UP (ref 150–400)
PMV BLD: 10.4 FL — SIGNIFICANT CHANGE UP (ref 7–13)
POTASSIUM SERPL-MCNC: 4.8 MMOL/L — SIGNIFICANT CHANGE UP (ref 3.5–5.3)
POTASSIUM SERPL-SCNC: 4.8 MMOL/L — SIGNIFICANT CHANGE UP (ref 3.5–5.3)
PROT SERPL-MCNC: 6.2 G/DL — SIGNIFICANT CHANGE UP (ref 6–8.3)
RBC # BLD: 3.64 M/UL — LOW (ref 3.8–5.2)
RBC # FLD: 18.5 % — HIGH (ref 10.3–14.5)
SODIUM SERPL-SCNC: 136 MMOL/L — SIGNIFICANT CHANGE UP (ref 135–145)
SPECIMEN SOURCE: SIGNIFICANT CHANGE UP
WBC # BLD: 6.63 K/UL — SIGNIFICANT CHANGE UP (ref 3.8–10.5)
WBC # FLD AUTO: 6.63 K/UL — SIGNIFICANT CHANGE UP (ref 3.8–10.5)

## 2019-08-20 PROCEDURE — 99232 SBSQ HOSP IP/OBS MODERATE 35: CPT | Mod: GC

## 2019-08-20 PROCEDURE — 99232 SBSQ HOSP IP/OBS MODERATE 35: CPT

## 2019-08-20 PROCEDURE — 99233 SBSQ HOSP IP/OBS HIGH 50: CPT | Mod: GC

## 2019-08-20 RX ADMIN — PIPERACILLIN AND TAZOBACTAM 25 GRAM(S): 4; .5 INJECTION, POWDER, LYOPHILIZED, FOR SOLUTION INTRAVENOUS at 13:35

## 2019-08-20 RX ADMIN — PIPERACILLIN AND TAZOBACTAM 25 GRAM(S): 4; .5 INJECTION, POWDER, LYOPHILIZED, FOR SOLUTION INTRAVENOUS at 21:02

## 2019-08-20 RX ADMIN — PIPERACILLIN AND TAZOBACTAM 25 GRAM(S): 4; .5 INJECTION, POWDER, LYOPHILIZED, FOR SOLUTION INTRAVENOUS at 05:23

## 2019-08-20 RX ADMIN — SODIUM CHLORIDE 75 MILLILITER(S): 9 INJECTION INTRAMUSCULAR; INTRAVENOUS; SUBCUTANEOUS at 05:23

## 2019-08-20 NOTE — PROGRESS NOTE ADULT - ASSESSMENT
68 year old female, with past history significant for HTN, Anemia, Smoking, PVD, Claudication of R-LE, and RLE stent placement, transferred from [facility] secondary to need for "EUS and possible biopsy of pancreatic mass." Patient c/o persistent mid abdominal pain for ~ 2 to 3 weeks, and associated with nausea, weight loss of 47 pounds in 2 mos, decreased oral intake, persistent diarrhea found to have a pancreatic mass, biliary ductal dilatation and pulmonary nodules.      Pancreatic Adenocarcinoma  -pancreatic mass biopsy showed moderately to poorly diff adeno; duodenal biopsy showed mucosa with extensive gastric foveolar metaplasia, suggestive of peptic injury  -likely metastatic to the lungs; s/p IR lung biopsy on 8/15, path confirming metastases from pancreas  -CA 19-9 - 53  -s/p IR proced- successful placement of CBD stent and internal/external biliary drainage catheter on 8/19  -t bili downtrending, 5 today  -patient with good performance status; likely a candidate for chemotherapy outpatient based on results above  -PT recommends home without PT  -will refer to see Dr. Epps at [facility] upon discharge    Tammi Mckay  Oncology Fellow  [phone]

## 2019-08-20 NOTE — PHYSICAL THERAPY INITIAL EVALUATION ADULT - GAIT DISTANCE, PT EVAL
10 feet/Distance limited secondary to pt. reporting lightheadedness. Symptoms improved when pt. returned to supine in bed. RN made aware.

## 2019-08-20 NOTE — PROGRESS NOTE ADULT - ASSESSMENT
67 yo F with 2 weeks of diarrhea and RUQ pain, found to have a pancreatic head mass and obstructive jaundice at OSH, transfered to [facility] for EUS. EUS with biopsy on 8/12 showed pancreatic adenocarinoma. CT chest concerning for metastatic disease, s/p IR biopsy on 8/15.  Pt. with elevated Tbili, awaiting GI procedure on 8/19. S/p internal/external biliary drain placement (8/19).    Plan:  - IV antibiotics  - Rec GI procedure, ERCP  - Surgical plan pending path and ERCP  - Remainder of care per primary team    D Team Surgery #[id number] 67 yo F with 2 weeks of diarrhea and RUQ pain, found to have a pancreatic head mass and obstructive jaundice at OSH, transfered to [facility] for EUS. EUS with biopsy on 8/12 showed pancreatic adenocarinoma. CT chest concerning for metastatic disease, s/p IR biopsy on 8/15.  Pt. with elevated Tbili, awaiting GI procedure on 8/19. S/p internal/external biliary drain placement (8/19).    Plan:  - IV antibiotics  - S/p GI procedure, ERCP  - No surgical interventions indicated at this time as pt has metastatic disease  - Remainder of care per primary team    D Team Surgery #[id number] 67 yo F with 2 weeks of diarrhea and RUQ pain, found to have a pancreatic head mass and obstructive jaundice at OSH, transfered to [facility] for EUS. EUS with biopsy on 8/12 showed pancreatic adenocarinoma. CT chest concerning for metastatic disease, s/p IR biopsy on 8/15.  S/p ERCP with GI on 8/19. S/p internal/external biliary drain placement (8/19).    Plan:  - IV antibiotics  - S/p GI procedure, ERCP  - No surgical interventions indicated at this time as pt has metastatic disease  - Remainder of care per primary team    D Team Surgery #[id number]

## 2019-08-20 NOTE — PHYSICAL THERAPY INITIAL EVALUATION ADULT - GENERAL OBSERVATIONS, REHAB EVAL
Consult received, chart reviewed. Patient received supine in bed, no apparent distress. Patient agreed to Evaluation from Physical Therapist.

## 2019-08-20 NOTE — PROGRESS NOTE ADULT - ASSESSMENT
68 year old female, with past history significant for HTN, Anemia, Smoking, PVD, Claudication of R-LE, and RLE stent placement, who presents for evaluation of pancreatic mass from [facility].    # Bile leak s/p CBD stent, internal-external catheter placement by IR  #pancreatic mass with obstructive jaundice - path consistent with pdac  # PVD with RLE stent  #UTI on abx    Recs:  - Continue IV antibiotics  - NPO for now  - repeat CBC, CMP, INR  - monitor clinically for pain/hemodynamic status

## 2019-08-20 NOTE — PROGRESS NOTE ADULT - SUBJECTIVE AND OBJECTIVE BOX
Patient is a 68y old  Female who presents with a chief complaint of Pancreatic mass, Obstructive cholestatic liver disease, Urinary tract infection without Hematuria, Hypokalemia, PVD, Essential Hypertension. (20 Aug 2019 14:02)      SUBJECTIVE / OVERNIGHT EVENTS:  Patient notes no pain with clear liquid diet.  now  has external biliary drain tube    MEDICATIONS  (STANDING):  amLODIPine   Tablet 10 milliGRAM(s) Oral daily  ergocalciferol 36876 Unit(s) Oral <User Schedule>  multivitamin 1 Tablet(s) Oral daily  piperacillin/tazobactam IVPB.. 3.375 Gram(s) IV Intermittent every 8 hours  polyethylene glycol 3350 17 Gram(s) Oral daily  sodium chloride 0.9%. 1000 milliLiter(s) (75 mL/Hr) IV Continuous <Continuous>    MEDICATIONS  (PRN):  ALBUTerol    90 MICROgram(s) HFA Inhaler 2 Puff(s) Inhalation every 6 hours PRN Shortness of Breath and/or Wheezing  morphine  - Injectable 4 milliGRAM(s) IV Push every 6 hours PRN breakthroug severe pain  ondansetron Injectable 4 milliGRAM(s) IV Push every 6 hours PRN Nausea and/or Vomiting  oxyCODONE    IR 5 milliGRAM(s) Oral every 6 hours PRN Moderate Pain (4 - 6)        19 Aug 2019 07:01  -  20 Aug 2019 07:00  --------------------------------------------------------  IN: 75 mL / OUT: 40 mL / NET: 35 mL    Vital Signs Last 24 Hrs  T(C): 36.7 (20 Aug 2019 14:07), Max: 37 (19 Aug 2019 15:17)  T(F): 98.1 (20 Aug 2019 14:07), Max: 98.6 (19 Aug 2019 15:17)  HR: 66 (20 Aug 2019 14:07) (60 - 96)  BP: 133/81 (20 Aug 2019 14:07) (112/65 - 145/75)  BP(mean): 82 (19 Aug 2019 21:30) (82 - 93)  RR: 18 (20 Aug 2019 14:07) (18 - 22)  SpO2: 100% (20 Aug 2019 14:07) (94% - 100%)    PHYSICAL EXAM:  GENERAL: NAD, well-developed  HEAD:  Atraumatic, Normocephalic  EYES: EOMI, PERRLA, conjunctiva and sclera clear  NECK: Supple, No JVD  CHEST/LUNG: Clear to auscultation bilaterally; No wheeze  HEART: Regular rate and rhythm; No murmurs, rubs, or gallops  ABDOMEN: Soft, Nontender, Nondistended; Bowel sounds present  exrt biliary drain tube with brown , clear   EXTREMITIES:  2+ Peripheral Pulses, No clubbing, cyanosis, or edema  PSYCH: AAOx3  NEUROLOGY: non-focal  SKIN: mild jaundice    LABS:                        10.0   6.63  )-----------( 314      ( 20 Aug 2019 07:30 )             30.1     08-20    136  |  102  |  6<L>  ----------------------------<  106<H>  4.8   |  24  |  0.58    Ca    9.1      20 Aug 2019 07:30  Phos  3.8     08-19  Mg     1.8     08-19    TPro  6.2  /  Alb  3.1<L>  /  TBili  5.7<H>  /  DBili  x   /  AST  72<H>  /  ALT  72<H>  /  AlkPhos  519<H>  08-20    PT/INR - ( 19 Aug 2019 06:05 )   PT: 12.0 SEC;   INR: 1.08                Care Discussed with Consultants/Other Providers:  gi/onc/surg

## 2019-08-20 NOTE — PROGRESS NOTE ADULT - SUBJECTIVE AND OBJECTIVE BOX
SUBJECTIVE: no pain or SOB      MEDICATIONS  (STANDING):  amLODIPine   Tablet 10 milliGRAM(s) Oral daily  ergocalciferol 33292 Unit(s) Oral <User Schedule>  multivitamin 1 Tablet(s) Oral daily  piperacillin/tazobactam IVPB.. 3.375 Gram(s) IV Intermittent every 8 hours  polyethylene glycol 3350 17 Gram(s) Oral daily  sodium chloride 0.9%. 1000 milliLiter(s) (75 mL/Hr) IV Continuous <Continuous>    MEDICATIONS  (PRN):  ALBUTerol    90 MICROgram(s) HFA Inhaler 2 Puff(s) Inhalation every 6 hours PRN Shortness of Breath and/or Wheezing  morphine  - Injectable 4 milliGRAM(s) IV Push every 6 hours PRN breakthroug severe pain  ondansetron Injectable 4 milliGRAM(s) IV Push every 6 hours PRN Nausea and/or Vomiting  oxyCODONE    IR 5 milliGRAM(s) Oral every 6 hours PRN Moderate Pain (4 - 6)      LABS:                            10.0   6.63  )-----------( 314      ( 20 Aug 2019 07:30 )             30.1    136  |  102  |  6<L>  ----------------------------<  106<H>  4.8   |  24  |  0.58    Ca    9.1      20 Aug 2019 07:30  Phos  3.8     08-19  Mg     1.8     08-19    TPro  6.2  /  Alb  3.1<L>  /  TBili  5.7<H>  /  DBili  x   /  AST  72<H>  /  ALT  72<H>  /  AlkPhos  519<H>  08-20    Creatinine Trend: 0.58<--, 0.58<--, 0.59<--, 0.54<--, 0.72<--, 0.55<--     PHYSICAL EXAM  Vital Signs Last 24 Hrs  T(C): 36.6 (20 Aug 2019 05:22), Max: 37 (19 Aug 2019 15:17)  T(F): 97.8 (20 Aug 2019 05:22), Max: 98.6 (19 Aug 2019 15:17)  HR: 68 (20 Aug 2019 05:22) (60 - 96)  BP: 112/77 (20 Aug 2019 05:22) (112/65 - 145/75)  BP(mean): 82 (19 Aug 2019 21:30) (82 - 93)  RR: 18 (20 Aug 2019 05:22) (18 - 22)  SpO2: 100% (20 Aug 2019 05:22) (94% - 100%)      Cardiovascular:  S1S2 RRR, No JVD  Respiratory: diminished at bases, normal effort  Gastrointestinal: Abdomen soft, ND, NT, +BS  Skin: Warm, dry, intact. No rash.  Musculoskeletal: Normal ROM, normal strength  Ext: No C/C/E B/L LE    DIAGNOSTIC DATA  EKG: NSR    < from: Transthoracic Echocardiogram (08.16.19 @ 15:34) >  CONCLUSIONS:  1. Calcified trileaflet aortic valve with normal opening.  2. Normal left ventricular internal dimensions and wall  thicknesses.  3. Overall preserved left ventricular systolic function.  Basal inferior and basal inferoseptal wall hypokinesis.  4. Normal right ventricular size and function.  ------------------------------------------------------------------------  Confirmed on  8/16/2019 - 17:06:36 by Jason Dee M.D. RPVI    < end of copied text >      Echo: Done as outpatient on 6/29/18 (full result placed in physical chart)  IMPRESSION: Normal LV size and systolic function. Estimated LVEF 60%. LV wall motion abnormality - hypokinesis of basal and inferoseptal segments as well as entire inferior wall. Remaining segments with normal wall motion. Grade 1 diastolic dysfunction. Normal RV size and function.    NST: Done in our office on 7/9/18 (full result placed in physical chart)  CONCLUSIONS: Normal Study .   Normal myocardial perfusion SPECT images.   Normal left ventricular size and function. Calculated EF is 63%.     ASSESSMENT AND PLAN:  Patient is a 68 year old female known to our office (Cardiologist - Dr. Cash) with PMHx of HTN, former smoker, anemia, COPD (with inhaler use per patient), PVD s/p RLE bypass in 2018 who had an echo with normal LV/RV function and a normal nuclear stress test in our office last year as preop for LE bypass now transferred from [facility]- for work up of pancreatic mass (path + adenoca) and lung biopsy.  s/p IR CBD stent and drain placement last PM    - No evidence of clinical HF or anginal symptoms  - Repeat echo noted/unchanged from prior  --s/p Lung Bx, f/u path   --f/u if surgery planned

## 2019-08-20 NOTE — PHYSICAL THERAPY INITIAL EVALUATION ADULT - PERTINENT HX OF CURRENT PROBLEM, REHAB EVAL
Per documentation, Pt. direct transfer from [facility]- secondary to need for "EUS and possible biopsy of pancreatic mass." Pt. s/p internal/external biliary drain placement on 8/19.

## 2019-08-20 NOTE — CHART NOTE - NSCHARTNOTEFT_GEN_A_CORE
Patient Age: 68    Patient Gender: F    Procedure (including site/side if known): Stent Check     Diagnosis/Indication: Metastatic Pancreatic Ca     Interventional Radiology Attending Physician: Dr. Santacruz    Ordering Attending Physician: Dr. Gunter     Pertinent medical history: HTN, Anemia, Smoking, PVD, Claudication of R-LE, and RLE stent placement,     Pertinent Labs:                       10.0   6.63  )-----------( 314      ( 20 Aug 2019 07:30 )             30.1     08-20    136  |  102  |  6<L>  ----------------------------<  106<H>  4.8   |  24  |  0.58    Ca    9.1      20 Aug 2019 07:30  Phos  3.8     08-19  Mg     1.8     08-19    TPro  6.2  /  Alb  3.1<L>  /  TBili  5.7<H>  /  DBili  x   /  AST  72<H>  /  ALT  72<H>  /  AlkPhos  519<H>  08-20    PT/INR - ( 19 Aug 2019 06:05 )   PT: 12.0 SEC;   INR: 1.08           Patient and Family aware? Yes      Attending/Resident/NP/ZOILA Mccarty ANP-C     Contact: [phone]              Date:  8/20/19              time:6:34

## 2019-08-20 NOTE — PROGRESS NOTE ADULT - SUBJECTIVE AND OBJECTIVE BOX
INTERVAL HPI/OVERNIGHT EVENTS:  Patient S&E at bedside. No o/n events, patient resting comfortably.     VITAL SIGNS:  T(F): 98.1 (08-20-19 @ 14:07)  HR: 66 (08-20-19 @ 14:07)  BP: 133/81 (08-20-19 @ 14:07)  RR: 18 (08-20-19 @ 14:07)  SpO2: 100% (08-20-19 @ 14:07)  Wt(kg): --    PHYSICAL EXAM:  Constitutional: NAD  Eyes: EOMI, sclera non-icteric  Neck: supple, no masses, no JVD  Respiratory: CTA b/l, good air entry b/l  Cardiovascular: RRR, no M/R/G  Gastrointestinal: soft, NTND, no masses palpable, + BS, external drain  Extremities: no c/c/e  Neurological: AAOx3      MEDICATIONS  (STANDING):  amLODIPine   Tablet 10 milliGRAM(s) Oral daily  ergocalciferol 71576 Unit(s) Oral <User Schedule>  multivitamin 1 Tablet(s) Oral daily  piperacillin/tazobactam IVPB.. 3.375 Gram(s) IV Intermittent every 8 hours  polyethylene glycol 3350 17 Gram(s) Oral daily  sodium chloride 0.9%. 1000 milliLiter(s) (75 mL/Hr) IV Continuous <Continuous>    MEDICATIONS  (PRN):  ALBUTerol    90 MICROgram(s) HFA Inhaler 2 Puff(s) Inhalation every 6 hours PRN Shortness of Breath and/or Wheezing  morphine  - Injectable 4 milliGRAM(s) IV Push every 6 hours PRN breakthroug severe pain  ondansetron Injectable 4 milliGRAM(s) IV Push every 6 hours PRN Nausea and/or Vomiting  oxyCODONE    IR 5 milliGRAM(s) Oral every 6 hours PRN Moderate Pain (4 - 6)      Allergies    No Known Allergies    Intolerances        LABS:                        10.0   6.63  )-----------( 314      ( 20 Aug 2019 07:30 )             30.1     08-20    136  |  102  |  6<L>  ----------------------------<  106<H>  4.8   |  24  |  0.58    Ca    9.1      20 Aug 2019 07:30  Phos  3.8     08-19  Mg     1.8     08-19    TPro  6.2  /  Alb  3.1<L>  /  TBili  5.7<H>  /  DBili  x   /  AST  72<H>  /  ALT  72<H>  /  AlkPhos  519<H>  08-20    PT/INR - ( 19 Aug 2019 06:05 )   PT: 12.0 SEC;   INR: 1.08                RADIOLOGY & ADDITIONAL TESTS:  Studies reviewed.    ASSESSMENT & PLAN:

## 2019-08-20 NOTE — PHYSICAL THERAPY INITIAL EVALUATION ADULT - ADDITIONAL COMMENTS
Pt. reports owning DME of straight cane.     Pt. was left supine in bed post PT Evaluation, no apparent distress, all lines intact, call everett within reach. JANN Alfaro made aware of pt. status and participation in PT.

## 2019-08-20 NOTE — PROGRESS NOTE ADULT - SUBJECTIVE AND OBJECTIVE BOX
Chief Complaint:  Patient is a 68y old  Female who presents with a chief complaint of Pancreatic mass, Obstructive cholestatic liver disease, Urinary tract infection without Hematuria, Hypokalemia, PVD, Essential Hypertension. (19 Aug 2019 20:24)      Interval Events: Patient underwent IR CBD stent and internal/external catheter placement yesterday evening for bile leak.    Allergies:  No Known Allergies      Hospital Medications:  ALBUTerol    90 MICROgram(s) HFA Inhaler 2 Puff(s) Inhalation every 6 hours PRN  amLODIPine   Tablet 10 milliGRAM(s) Oral daily  ergocalciferol 07883 Unit(s) Oral <User Schedule>  morphine  - Injectable 4 milliGRAM(s) IV Push every 6 hours PRN  multivitamin 1 Tablet(s) Oral daily  ondansetron Injectable 4 milliGRAM(s) IV Push every 6 hours PRN  oxyCODONE    IR 5 milliGRAM(s) Oral every 6 hours PRN  piperacillin/tazobactam IVPB.. 3.375 Gram(s) IV Intermittent every 8 hours  polyethylene glycol 3350 17 Gram(s) Oral daily  sodium chloride 0.9%. 1000 milliLiter(s) IV Continuous <Continuous>      PMHX/PSHX:  Iron deficiency anemia  Claudication  Anemia  Smoking history  HTN (hypertension)  History of colonoscopy  History of intravascular stent placement  History of  section      Family history:  Family history of cancer  Family history of hypertension      ROS:     General:  No wt loss, fevers, chills, night sweats, fatigue,   Eyes:  Good vision, no reported pain  ENT:  No sore throat, pain, runny nose, dysphagia  CV:  No pain, palpitations, hypo/hypertension  Resp:  No dyspnea, cough, tachypnea, wheezing  GI:  See HPI  :  No pain, bleeding, incontinence, nocturia  Muscle:  No pain, weakness  Neuro:  No weakness, tingling, memory problems  Psych:  No fatigue, insomnia, mood problems, depression  Endocrine:  No polyuria, polydipsia, cold/heat intolerance  Heme:  No petechiae, ecchymosis, easy bruisability  Skin:  No rash, edema      PHYSICAL EXAM:     GENERAL: NAD  HEENT:  NC/AT  CHEST:  Full & symmetric excursion, no increased effort  ABDOMEN:  Soft, non-tender, non-distended, +BS  EXTREMITIES:  no edema  SKIN:  No rash  NEURO:  Alert      Vital Signs:  Vital Signs Last 24 Hrs  T(C): 36.6 (20 Aug 2019 05:22), Max: 37 (19 Aug 2019 15:17)  T(F): 97.8 (20 Aug 2019 05:22), Max: 98.6 (19 Aug 2019 15:17)  HR: 68 (20 Aug 2019 05:22) (60 - 96)  BP: 112/77 (20 Aug 2019 05:22) (112/65 - 145/75)  BP(mean): 82 (19 Aug 2019 21:30) (82 - 93)  RR: 18 (20 Aug 2019 05:22) (18 - 22)  SpO2: 100% (20 Aug 2019 05:22) (94% - 100%)  Daily     Daily     LABS:                        9.9    5.10  )-----------( 322      ( 19 Aug 2019 06:05 )             29.0         135  |  99  |  7   ----------------------------<  107<H>  4.8   |  25  |  0.58    Ca    9.4      19 Aug 2019 06:05  Phos  3.8       Mg     1.8         TPro  6.4  /  Alb  2.9<L>  /  TBili  8.8<H>  /  DBili  x   /  AST  76<H>  /  ALT  80<H>  /  AlkPhos  578<H>      LIVER FUNCTIONS - ( 19 Aug 2019 06:05 )  Alb: 2.9 g/dL / Pro: 6.4 g/dL / ALK PHOS: 578 u/L / ALT: 80 u/L / AST: 76 u/L / GGT: x           PT/INR - ( 19 Aug 2019 06:05 )   PT: 12.0 SEC;   INR: 1.08                  Imaging:  < from: CT Abdomen and Pelvis w/ IV Cont (19 @ 14:24) >    EXAM:  CT ABDOMEN AND PELVIS IC        PROCEDURE DATE:  Aug 19 2019         INTERPRETATION:  CLINICAL INFORMATION: Abdominal pain, status post ERCP.   Pancreatic mass.     COMPARISON: 2019.    PROCEDURE:   CT of the Abdomen and Pelvis wasperformed with intravenous contrast.   Arterial and Portal Venous phases were acquired.  Intravenous contrast: 90 ml Omnipaque 350. 10 ml discarded.  Oral contrast: Water was administered.  Sagittal and coronal reformats were performed.    FINDINGS:    LOWER CHEST: Right middle lobe nodule (series 5, image 8), measuring 1.4   x 1.1 cm    LIVER: Subcentimeter hypodense right hepatic lesions, too small to   characterize.  BILE DUCTS: Mild intrahepatic biliary ductal dilatation, decreased since   August 10, 2019. Inflammatory changes surround the angela hepatis. Focus   of layering contrast in the angela hepatis, not definitely conforming to a   bile duct (series 5, image 40) and without significant change between the   arterial and venous scans, possibly extravasated contrast.  GALLBLADDER: Cholelithiasis and small amount of air within the   gallbladder.  SPLEEN: Within normal limits.  PANCREAS: Previously seen pancreatic head mass is not well visualized due   to complex fluid surrounding thepancreatic head. Abrupt cut off of the   main pancreatic duct with distal gland atrophy.  ADRENALS: Within normal limits.  KIDNEYS/URETERS: Within normal limits.    BLADDER: Within normal limits.  REPRODUCTIVE ORGANS: Hysterectomy.    BOWEL: Complex fluid surrounding the first and second portions of the   duodenum. Portions of both fluid are hyperdense (series 5, image 54). No   bowel obstruction. Appendix is normal. Colonic diverticulosis.  PERITONEUM: No ascites.  VESSELS: Atherosclerotic changes. Occluded right common iliac, external   iliac, and internal iliac arteries. And 10 bypass graft.  RETROPERITONEUM/LYMPH NODES: Enlarged portacaval node (series 5, image   46), measuring 2.3 x 0.9 cm.  ABDOMINAL WALL: Within normal limits.  BONES: Degenerative changes.    IMPRESSION:     Complex fluid and hyperdense material in the region of the angela hepatis,   duodenum, and pancreas, suspicious for extravasated bile. Probable   associated hemorrhage and fluid.    Mild biliary ductal dilatation, decreased in degree of dilatation,   probably due to bile duct injury.    Known pancreatic mass is not well visualized due to surrounding   inflammation.    Findings were discussed with Dr. Mcdermott 2019 2:51 PM by Dr. Adams   with read back confirmation.      ORLY ADAMS M.D., ATTENDING RADIOLOGIST  This document has been electronically signed. Aug 19 2019  4:54PM        < end of copied text >      < from: ERCP (19 @ 08:08) >    [facility]  _______________________________________________________________________________  Patient Name: Jenn Garcia             Procedure Date: 2019 8:08 AM  MRN: [id number]                     Account Number: [id number]  YOB: 1950             Admit Type: Inpatient  Room: [place] [address]                         Gender: Female  Attending MD: Farhan Pearson MD       _______________________________________________________________________________     Procedure:           ERCP  Indications:         Jaundice  Providers:           Farhan Pearson MD, ERICA MCDERMOTT (Fellow)  Medicines:           General Anesthesia  Complications:       No immediate complications. However when the patient     woke up from anaesthesia she complained of abdominal                        pain. She had been given Cipro during the procedure. A                        CT scan was obtained which showed a fluid collection                        consistent with bile around the second portion of the                        duodenum. IR was called to perform a percutaneous                        drainage. The pt and her daughter was informed of the                        complication.  Procedure:           Pre-Anesthesia Assessment:                       - Prior to the procedure, a History and Physical was                        performed, and patient medications and allergies were                        reviewed. The patient is competent. The risks and                    benefits of the procedure and the sedation options and                        risks were discussed with the patient. All questions                        were answered and informed consent was obtained. Patient   identification and proposed procedure were verified by                        the physician, the nurse, the anesthesiologist and the                        anesthetist in the endoscopy suite. Prophylactic                        Antibiotics: The patient requires prophylactic                        antibiotics for the planned performance of ERCP in                        obstructed bile duct. Prior Anticoagulants: The patient                        has taken no previous anticoagulant or antiplatelet                        agents. ASA Grade Assessment: III - A patient with                        severe systemic disease. After reviewing the risks and                        benefits, the patient was deemed in satisfactory                        condition to undergo the procedure. The anesthesia plan                        was to use general anesthesia. Immediately prior to                        administration of medications, the patient was                        re-assessed for adequacy to receive sedatives. The heart                        rate, respiratory rate, oxygen saturations, blood                        pressure, adequacy of pulmonary ventilation, and                        response to care were monitored throughout the                 procedure. The physical status of the patient was                        re-assessed after the procedure.                       After obtaining informed consent, the scope was passed                        under direct vision. Throughout the procedure, the                        patient's blood pressure, pulse, and oxygen saturations                        were monitored continuously. The ERCP was introduced                        through the mouth, and advanced to the duodenum and used                        to inject contrast into the bile duct.                                                                                   Findings:       The  film was normal. The esophagus was successfully intubated        under direct vision without detailed examination of the pharynx, larynx,        and associated structures, and upper GI tract. The upper GI tract was        grossly normal. A frondlike/villous mass was found at the major papilla.        The ampulla could not be found despite multiple attempts. The bile duct        could not be cannulated with the Hydratome. The desired access to the        proximal extent of the bile duct was not achieved due to the presence of        a mass. Thus, preparations were made fora rendezvous maneuver. An        echoendoscope was used for examination. The bile duct was punctured        using an appropriately sized needle under endosonographic and radiologic        guidance. The bile duct was injected with contrast, and a cholangiogram        was obtained under fluoroscopy. A guidewire was passed anterograde        through the needle and used successfully to traverse the obstruction.        The end of the guidewire was passed through the major papilla, however,        could not be advanced deeply into the duodenum due to obstruction at the        ampulla. An exchange over the wire was attempted, however the wire did        not stay in the bile duct. Direct cannulation with the duodenoscope and        the Hydratome was attempted but unsucessful.                                                                                   Impression:          - The major papilla appeared to have a mass.                       - Unsuccessful rondexvous cannulation.  Recommendation:      - Return patient to hospital [facility] for ongoing care.                       - IV antibiotics                       - NPO                       - IR consultation for percutaneous biliary drainage.                                 Attending Participation:       I was present and participated during the entire procedure, including        non-key portions.                                                                                     __________________  Farhan Pearson MD  2019 3:55:30 PM  This report has been signed electronically.  Number of Addenda: 0    Note Initiated On: 2019 8:08 AM    < end of copied text >  Surgical Pathology Report:   ACCESSION No:  [id number]    JENN GARCIA                          1        Surgical Final Report          Final Diagnosis    1. Duodenum, biopsy  - Duodenal mucosa with extensive gastric foveolar  metaplasia, suggestive of peptic injury    2. Pancreas, mass, fine needle biopsy  -  Adenocarcinoma, moderately to poorly differentiated. See  note  Note: There is a large aggregate of neuroendocrine cells,  possibly representing an islet, that appears to be to entrapped  in the tumor, and not in itself neoplastic.  The slide was reviewed in the Intradepartment GI Pathology  Conference.    Verified by: Halley Cerda M.D.  (Electronic Signature)  Reported on: 19 16:52 EDT, [address] [place] [address]  [id number]  _________________________________________________________________    Clinical History  Pre: Pancreatic mass  Post: Pancreatic mass    Specimen(s) Submitted  1- Duodenum biopsy  2- FNB Pancreatic mass    Gross Description  1. The specimen is received in formalin and the specimen  container is labeled: Duodenum biopsy.  It  consists of three  fragments of soft, tan-pink tissue ranging from 0.3 cm to 0.4 cm  in maximum dimension and a 0.4 cm in greatest dimension portion  of brown debris.  Entirely submitted.  One cassette.    2. The specimen is received in formalin and the specimen  container is labeled: FNB pancreatic mass.  It consists of a 1.3  x 0.6 x 0.1 cm aggregate of red-tan cylindrical cores.  Entirely  submitted.  One cassette.    In addition to other data that may appear on the specimen  containers, all labels have been inspected to confirm the  presence of the patient's name and date of birth.    Aric Davies 2019 18:48 (19 @ 10:30)

## 2019-08-20 NOTE — PHYSICAL THERAPY INITIAL EVALUATION ADULT - CRITERIA FOR SKILLED THERAPEUTIC INTERVENTIONS
anticipated discharge recommendation/therapy frequency/rehab potential/predicted duration of therapy intervention/impairments found

## 2019-08-20 NOTE — PROGRESS NOTE ADULT - ATTENDING COMMENTS
Patient seen and examined.  Agree with above.  -TTE with normal LV function  -further cardiac workup pending clinical course and surgical plan    Maximiliano Sarmiento MD

## 2019-08-20 NOTE — PROGRESS NOTE ADULT - SUBJECTIVE AND OBJECTIVE BOX
Team D Surgery Progress Note     SUBJECTIVE / 24H EVENTS  Patient seen and examined on morning rounds. No acute events overnight.  Underwent successful placement of a [id number] CBD stent and 10.2 internal/external biliary drainage catheter via segment 3 hepatic duct.  Reports feeling well this morning. Tolerating diet. Denies nausea/vomiting. Pain controlled.    OBJECTIVE:    VITAL SIGNS:  T(C): 36.6 (08-20-19 @ 05:22), Max: 37 (08-19-19 @ 15:17)  HR: 68 (08-20-19 @ 05:22) (60 - 96)  BP: 112/77 (08-20-19 @ 05:22) (112/65 - 145/75)  RR: 18 (08-20-19 @ 05:22) (18 - 22)  SpO2: 100% (08-20-19 @ 05:22) (94% - 100%)  Daily     Daily       PHYSICAL EXAM:  Gen: NAD.  Resp: Respirations unlabored.  Card: RRR.   GI: Soft. Nondistended. Upper abdominal tenderness to palpation around drain.  Ext: Moves all extremities spontaneously.      08-19-19 @ 07:01  -  08-20-19 @ 07:00  --------------------------------------------------------  IN:    sodium chloride 0.9%.: 75 mL  Total IN: 75 mL    OUT:    Drain: 40 mL  Total OUT: 40 mL    Total NET: 35 mL          LAB VALUES:  08-20    136  |  102  |  6<L>  ----------------------------<  106<H>  4.8   |  24  |  0.58    Ca    9.1      20 Aug 2019 07:30  Phos  3.8     08-19  Mg     1.8     08-19    TPro  6.2  /  Alb  3.1<L>  /  TBili  5.7<H>  /  DBili  x   /  AST  72<H>  /  ALT  72<H>  /  AlkPhos  519<H>  08-20                               10.0   6.63  )-----------( 314      ( 20 Aug 2019 07:30 )             30.1     LIVER FUNCTIONS - ( 20 Aug 2019 07:30 )  Alb: 3.1 g/dL / Pro: 6.2 g/dL / ALK PHOS: 519 u/L / ALT: 72 u/L / AST: 72 u/L / GGT: x           PT/INR - ( 19 Aug 2019 06:05 )   PT: 12.0 SEC;   INR: 1.08                      MICROBIOLOGY:    No new microbiology data for review.     RADIOLOGY:  PACS Image: Image(s) Available (08-19-19 @ 14:24)    No new radiographic images for review.    MEDICATIONS  (STANDING):  amLODIPine   Tablet 10 milliGRAM(s) Oral daily  ergocalciferol 72374 Unit(s) Oral <User Schedule>  multivitamin 1 Tablet(s) Oral daily  piperacillin/tazobactam IVPB.. 3.375 Gram(s) IV Intermittent every 8 hours  polyethylene glycol 3350 17 Gram(s) Oral daily  sodium chloride 0.9%. 1000 milliLiter(s) (75 mL/Hr) IV Continuous <Continuous>    MEDICATIONS  (PRN):  ALBUTerol    90 MICROgram(s) HFA Inhaler 2 Puff(s) Inhalation every 6 hours PRN Shortness of Breath and/or Wheezing  morphine  - Injectable 4 milliGRAM(s) IV Push every 6 hours PRN breakthroug severe pain  ondansetron Injectable 4 milliGRAM(s) IV Push every 6 hours PRN Nausea and/or Vomiting  oxyCODONE    IR 5 milliGRAM(s) Oral every 6 hours PRN Moderate Pain (4 - 6)

## 2019-08-20 NOTE — PROGRESS NOTE ADULT - ASSESSMENT
68f with 2 weeks of diarrhea and now with pain to the ruq with multiple stones rto the ruq, found to have a Pancreatic  head mass at OSH and obstructive jaundice, transferred for EUS. Pt, underwent EUS with biopsy on 8/12, which showed pancreatic adenocarinoma, she was also found to have a  CT chest concerning for metastatic disease, s/p IR biopsy on 8/15.  Pt. with uptrending    Tbili, now at 8.8  s/p GI procedure on 8/19 for stent- attempted with no succes- had  ext drain placed by ir  L arm iv pain- reloved

## 2019-08-21 LAB
ALBUMIN SERPL ELPH-MCNC: 2.9 G/DL — LOW (ref 3.3–5)
ALP SERPL-CCNC: 443 U/L — HIGH (ref 40–120)
ALT FLD-CCNC: 57 U/L — HIGH (ref 4–33)
ANION GAP SERPL CALC-SCNC: 12 MMO/L — SIGNIFICANT CHANGE UP (ref 7–14)
AST SERPL-CCNC: 43 U/L — HIGH (ref 4–32)
BILIRUB SERPL-MCNC: 4.5 MG/DL — HIGH (ref 0.2–1.2)
BUN SERPL-MCNC: 4 MG/DL — LOW (ref 7–23)
CALCIUM SERPL-MCNC: 8.5 MG/DL — SIGNIFICANT CHANGE UP (ref 8.4–10.5)
CHLORIDE SERPL-SCNC: 100 MMOL/L — SIGNIFICANT CHANGE UP (ref 98–107)
CO2 SERPL-SCNC: 23 MMOL/L — SIGNIFICANT CHANGE UP (ref 22–31)
CREAT SERPL-MCNC: 0.53 MG/DL — SIGNIFICANT CHANGE UP (ref 0.5–1.3)
GLUCOSE SERPL-MCNC: 97 MG/DL — SIGNIFICANT CHANGE UP (ref 70–99)
HCT VFR BLD CALC: 29 % — LOW (ref 34.5–45)
HGB BLD-MCNC: 9.9 G/DL — LOW (ref 11.5–15.5)
INR BLD: 1.18 — HIGH (ref 0.88–1.17)
MCHC RBC-ENTMCNC: 27.6 PG — SIGNIFICANT CHANGE UP (ref 27–34)
MCHC RBC-ENTMCNC: 34.1 % — SIGNIFICANT CHANGE UP (ref 32–36)
MCV RBC AUTO: 80.8 FL — SIGNIFICANT CHANGE UP (ref 80–100)
NRBC # FLD: 0 K/UL — SIGNIFICANT CHANGE UP (ref 0–0)
PLATELET # BLD AUTO: 315 K/UL — SIGNIFICANT CHANGE UP (ref 150–400)
PMV BLD: 10.2 FL — SIGNIFICANT CHANGE UP (ref 7–13)
POTASSIUM SERPL-MCNC: 4 MMOL/L — SIGNIFICANT CHANGE UP (ref 3.5–5.3)
POTASSIUM SERPL-SCNC: 4 MMOL/L — SIGNIFICANT CHANGE UP (ref 3.5–5.3)
PROT SERPL-MCNC: 5.9 G/DL — LOW (ref 6–8.3)
PROTHROM AB SERPL-ACNC: 13.2 SEC — HIGH (ref 9.8–13.1)
RBC # BLD: 3.59 M/UL — LOW (ref 3.8–5.2)
RBC # FLD: 18.3 % — HIGH (ref 10.3–14.5)
SODIUM SERPL-SCNC: 135 MMOL/L — SIGNIFICANT CHANGE UP (ref 135–145)
WBC # BLD: 5.51 K/UL — SIGNIFICANT CHANGE UP (ref 3.8–10.5)
WBC # FLD AUTO: 5.51 K/UL — SIGNIFICANT CHANGE UP (ref 3.8–10.5)

## 2019-08-21 PROCEDURE — 99233 SBSQ HOSP IP/OBS HIGH 50: CPT

## 2019-08-21 PROCEDURE — 99232 SBSQ HOSP IP/OBS MODERATE 35: CPT

## 2019-08-21 PROCEDURE — 99232 SBSQ HOSP IP/OBS MODERATE 35: CPT | Mod: GC

## 2019-08-21 RX ORDER — HEPARIN SODIUM 5000 [USP'U]/ML
5000 INJECTION INTRAVENOUS; SUBCUTANEOUS ONCE
Refills: 0 | Status: COMPLETED | OUTPATIENT
Start: 2019-08-21 | End: 2019-08-21

## 2019-08-21 RX ORDER — MORPHINE SULFATE 50 MG/1
2 CAPSULE, EXTENDED RELEASE ORAL EVERY 4 HOURS
Refills: 0 | Status: DISCONTINUED | OUTPATIENT
Start: 2019-08-21 | End: 2019-08-22

## 2019-08-21 RX ORDER — OXYCODONE HYDROCHLORIDE 5 MG/1
5 TABLET ORAL EVERY 6 HOURS
Refills: 0 | Status: DISCONTINUED | OUTPATIENT
Start: 2019-08-21 | End: 2019-08-22

## 2019-08-21 RX ADMIN — SODIUM CHLORIDE 75 MILLILITER(S): 9 INJECTION INTRAMUSCULAR; INTRAVENOUS; SUBCUTANEOUS at 06:06

## 2019-08-21 RX ADMIN — PIPERACILLIN AND TAZOBACTAM 25 GRAM(S): 4; .5 INJECTION, POWDER, LYOPHILIZED, FOR SOLUTION INTRAVENOUS at 06:06

## 2019-08-21 RX ADMIN — MORPHINE SULFATE 2 MILLIGRAM(S): 50 CAPSULE, EXTENDED RELEASE ORAL at 09:56

## 2019-08-21 RX ADMIN — MORPHINE SULFATE 2 MILLIGRAM(S): 50 CAPSULE, EXTENDED RELEASE ORAL at 10:11

## 2019-08-21 RX ADMIN — PIPERACILLIN AND TAZOBACTAM 25 GRAM(S): 4; .5 INJECTION, POWDER, LYOPHILIZED, FOR SOLUTION INTRAVENOUS at 22:26

## 2019-08-21 RX ADMIN — AMLODIPINE BESYLATE 10 MILLIGRAM(S): 2.5 TABLET ORAL at 06:09

## 2019-08-21 RX ADMIN — HEPARIN SODIUM 5000 UNIT(S): 5000 INJECTION INTRAVENOUS; SUBCUTANEOUS at 22:27

## 2019-08-21 RX ADMIN — PIPERACILLIN AND TAZOBACTAM 25 GRAM(S): 4; .5 INJECTION, POWDER, LYOPHILIZED, FOR SOLUTION INTRAVENOUS at 13:56

## 2019-08-21 NOTE — CHART NOTE - NSCHARTNOTEFT_GEN_A_CORE
Pt not npo today for cholangiogram and biliary drain internalization.     Please make pt npo after midnight for procedure tomorrow (8/22/2019).

## 2019-08-21 NOTE — PROGRESS NOTE ADULT - PROBLEM SELECTOR PLAN 2
likely in setting of pancreatic mass,   Eus 8/12- with pancreatic adenocarcinoma   - tentative plan for biliary stent on 8/19, given elevated bilirubin of 8.3- s/p shaquille drain tube -now 4.5  advance diet

## 2019-08-21 NOTE — PROGRESS NOTE ADULT - ASSESSMENT
68 year old woman with metastatic pancreatic adenocarcinoma    Plan:  1) OK TO dc  2) Med onc follow-up  3) Tumor board discussion  4) Internalize external PTC drain

## 2019-08-21 NOTE — PROGRESS NOTE ADULT - SUBJECTIVE AND OBJECTIVE BOX
PT seen and examined.  Pain much better, no nausea or vomiting. +GI fxn  Lung biopsy --> adenocarcinoma, likely pancreatic primary  Will follow-up with med onc next week    HPI:  68 year old female, with past history significant for HTN, Anemia, Smoking, PVD, Claudication of R-LE, and RLE stent placement, presented to the floor, as direct transfer from [facility] secondary to need for "EUS and possible biopsy of pancreatic mass."  Seen and evaluated at bedside; NAD.  Patient relates being contacted by her PMD and advised to present to the hospital ED for evaluation after findings of abnormal liver function tests.  Prior to then, patient notes persistent mid abdominal pain, without radiation, for the past ~ 2 to 3 weeks, and associated with nausea, but no vomiting.  Indicates weight loss of 47 pounds from 2019 to 2019.  This was in the setting of decreased oral intake; patient would have appetite for various foods, but would feel full on attempted eating.  Also relates persistent diarrhea, for which she was prescribed loperamide PRN.  Reports chronic intermittent chills, which was presumed to be due to previously diagnosed anemia.  No reports of fevers, sweating, headaches, dizziness, chest pain, shortness of breath, vomiting.    Vital signs upon arrival on the floor as follows: BP =  121/76, HR = 83, RR = 18, T = 36.9 C (98.4 F), O2 Sat = 97% on RA.  Diagnosed with Pancreatic mass, Obstructive cholestatic liver disease, Urinary tract infection without Hematuria, Hypokalemia, PVD, Essential Hypertension. (08 Aug 2019 20:28)      PAST MEDICAL & SURGICAL HISTORY:  Iron deficiency anemia  Claudication: ~ chiefly of RLE  Smoking history: ~ quit ~ 2017  HTN (hypertension)  History of colonoscopy: ~   History of intravascular stent placement: ~ RLE ([facility] ~ 2018)  History of  section      MEDICATIONS  (STANDING):  amLODIPine   Tablet 10 milliGRAM(s) Oral daily  ergocalciferol 83404 Unit(s) Oral <User Schedule>  multivitamin 1 Tablet(s) Oral daily  piperacillin/tazobactam IVPB.. 3.375 Gram(s) IV Intermittent every 8 hours  polyethylene glycol 3350 17 Gram(s) Oral daily  sodium chloride 0.9%. 1000 milliLiter(s) (75 mL/Hr) IV Continuous <Continuous>    MEDICATIONS  (PRN):  ALBUTerol    90 MICROgram(s) HFA Inhaler 2 Puff(s) Inhalation every 6 hours PRN Shortness of Breath and/or Wheezing  morphine  - Injectable 2 milliGRAM(s) IV Push every 4 hours PRN Severe Pain (7 - 10)  ondansetron Injectable 4 milliGRAM(s) IV Push every 6 hours PRN Nausea and/or Vomiting  oxyCODONE    IR 5 milliGRAM(s) Oral every 6 hours PRN Moderate Pain (4 - 6) and severe pain      Allergies    No Known Allergies    Intolerances        FAMILY HISTORY:  Family history of cancer: ~ mother (unknown type)  Family history of hypertension: ~ mother      Review of Systems    Constitutional, Eyes, ENT, Cardiovascular, Respiratory, Gastrointestinal, Genitourinary, Musculoskeletal, Integumentary, Neurological, Psychiatric, Endocrine, Heme/Lymph and Allergic/Immunologic review of systems are otherwise negative except as noted in HPI.     Vital Signs Last 24 Hrs  T(C): 36.9 (21 Aug 2019 06:00), Max: 37.2 (21 Aug 2019 02:00)  T(F): 98.4 (21 Aug 2019 06:00), Max: 99 (21 Aug 2019 02:00)  HR: 72 (21 Aug 2019 06:00) (72 - 81)  BP: 124/70 (21 Aug 2019 06:00) (120/67 - 126/67)  BP(mean): --  RR: 18 (21 Aug 2019 06:00) (18 - 18)  SpO2: 100% (21 Aug 2019 06:00) (99% - 100%)    Physical Exam  Constitutional: well developed, well nourished, in no acute distress and thin.   Eyes: PERRL, EOMI, no conjunctival infection, anicteric.   ENT: pharynx is unremarkable, moist mucus membrane, no oral lesions.   Neck: supple without JVD, no thyromegaly or masses appreciated.   Pulmonary: clear to auscultation bilaterally, no dullness, no wheezing.   Cardiac: RRR, normal S1S2, no murmurs, rubs, gallops.   Vascular: no JVD, no calf tenderness, venous stasis changes, varices.   Abdomen: normoactive bowel sounds, soft and nontender, no hepatosplenomegaly or masses appreciated.   Lymphatic: no peripheral adenopathy appreciated.   Musculoskeletal: full range of motion and no deformities appreciated.   Skin: normal appearance, no rash, nodules, vesicles, ulcers, erythema.   Neurology: grossly intact.   Psychiatric: affect appropriate.       LABS:  CBC Full  -  ( 21 Aug 2019 07:40 )  WBC Count : 5.51 K/uL  RBC Count : 3.59 M/uL  Hemoglobin : 9.9 g/dL  Hematocrit : 29.0 %  Platelet Count - Automated : 315 K/uL  Mean Cell Volume : 80.8 fL  Mean Cell Hemoglobin : 27.6 pg  Mean Cell Hemoglobin Concentration : 34.1 %  Auto Neutrophil # : x  Auto Lymphocyte # : x  Auto Monocyte # : x  Auto Eosinophil # : x  Auto Basophil # : x  Auto Neutrophil % : x  Auto Lymphocyte % : x  Auto Monocyte % : x  Auto Eosinophil % : x  Auto Basophil % : x        135  |  100  |  4<L>  ----------------------------<  97  4.0   |  23  |  0.53    Ca    8.5      21 Aug 2019 07:40    TPro  5.9<L>  /  Alb  2.9<L>  /  TBili  4.5<H>  /  DBili  x   /  AST  43<H>  /  ALT  57<H>  /  AlkPhos  443<H>      PT/INR - ( 21 Aug 2019 07:40 )   PT: 13.2 SEC;   INR: 1.18                RADIOLOGY & ADDITIONAL STUDIES:

## 2019-08-21 NOTE — PROGRESS NOTE ADULT - ASSESSMENT
68f with 2 weeks of diarrhea and now with pain to the ruq with multiple stones rto the ruq, found to have a Pancreatic  head mass at OSH and obstructive jaundice, transferred for EUS. Pt, underwent EUS with biopsy on 8/12, which showed pancreatic adenocarinoma, she was also found to have a  CT chest concerning for metastatic disease, s/p IR biopsy on 8/15.  Pt. with uptrending    Tbili, now at 8.8  s/p GI procedure on 8/19 for stent- attempted with no succes- had  ext drain placed by ir  L arm iv pain- reloved  Lung Bxp c/w Pancreatic CA on cytology  Pancreatic Cancer to head of pancreas with mets to lung- with improving Bili with placement of biliary drain  Hyperbili -impr- 4.5 total bili

## 2019-08-21 NOTE — PROGRESS NOTE ADULT - ASSESSMENT
68 year old female, with past history significant for HTN, Anemia, Smoking, PVD, Claudication of R-LE, and RLE stent placement, who presents for evaluation of pancreatic mass from [facility].    # Bile leak s/p CBD stent, internal-external catheter placement by IR  #pancreatic mass with obstructive jaundice - path consistent with pdac  # PVD with RLE stent  #UTI on abx    Recs:  - Continue IV antibiotics; will need 10 day course total, can convert to PO as outpatient  - advance diet as tolerated  - repeat CBC, CMP, INR  - monitor clinically for pain/hemodynamic status

## 2019-08-21 NOTE — PROGRESS NOTE ADULT - SUBJECTIVE AND OBJECTIVE BOX
Patient is a 68y old  Female who presents with a chief complaint of Pancreatic mass, Obstructive cholestatic liver disease, Urinary tract infection without Hematuria, Hypokalemia, PVD, Essential Hypertension. (21 Aug 2019 08:54)      SUBJECTIVE / OVERNIGHT EVENTS:  Patient notes pain at biliary drain site    MEDICATIONS  (STANDING):  amLODIPine   Tablet 10 milliGRAM(s) Oral daily  ergocalciferol 23374 Unit(s) Oral <User Schedule>  multivitamin 1 Tablet(s) Oral daily  piperacillin/tazobactam IVPB.. 3.375 Gram(s) IV Intermittent every 8 hours  polyethylene glycol 3350 17 Gram(s) Oral daily  sodium chloride 0.9%. 1000 milliLiter(s) (75 mL/Hr) IV Continuous <Continuous>    MEDICATIONS  (PRN):  ALBUTerol    90 MICROgram(s) HFA Inhaler 2 Puff(s) Inhalation every 6 hours PRN Shortness of Breath and/or Wheezing  ondansetron Injectable 4 milliGRAM(s) IV Push every 6 hours PRN Nausea and/or Vomiting  oxyCODONE    IR 5 milliGRAM(s) Oral every 6 hours PRN Moderate Pain (4 - 6) and severe pain      Vital Signs Last 24 Hrs  T(C): 36.9 (21 Aug 2019 06:00), Max: 37.2 (21 Aug 2019 02:00)  T(F): 98.4 (21 Aug 2019 06:00), Max: 99 (21 Aug 2019 02:00)  HR: 72 (21 Aug 2019 06:00) (66 - 81)  BP: 124/70 (21 Aug 2019 06:00) (120/67 - 133/81)  BP(mean): --  RR: 18 (21 Aug 2019 06:00) (18 - 18)  SpO2: 100% (21 Aug 2019 06:00) (99% - 100%)      PHYSICAL EXAM:  GENERAL: NAD, well-developed  HEAD:  Atraumatic, Normocephalic  EYES: EOMI, PERRLA, conjunctiva and sclera clear  NECK: Supple, No JVD  CHEST/LUNG: Clear to auscultation bilaterally; No wheeze  HEART: Regular rate and rhythm; No murmurs, rubs, or gallops  ABDOMEN: Soft, Nontender, Nondistended; Bowel sounds present  Biliary drain in place  EXTREMITIES:  2+ Peripheral Pulses, No clubbing, cyanosis, or edema  PSYCH: AAOx3  NEUROLOGY: non-focal  SKIN: No rashes or lesions    LABS:                        9.9    5.51  )-----------( 315      ( 21 Aug 2019 07:40 )             29.0     08-21    135  |  100  |  4<L>  ----------------------------<  97  4.0   |  23  |  0.53    Ca    8.5      21 Aug 2019 07:40    TPro  5.9<L>  /  Alb  2.9<L>  /  TBili  4.5<H>  /  DBili  x   /  AST  43<H>  /  ALT  57<H>  /  AlkPhos  443<H>  08-21    PT/INR - ( 21 Aug 2019 07:40 )   PT: 13.2 SEC;   INR: 1.18                    RADIOLOGY & ADDITIONAL TESTS:  < from: CT Abdomen and Pelvis w/ IV Cont (08.19.19 @ 14:24) >  IMPRESSION:   Complex fluid and hyperdense material in the region of the angela hepatis,   duodenum, and pancreas, suspicious for extravasated bile. Probable   associated hemorrhage and fluid.  Mild biliary ductal dilatation, decreased in degree of dilatation,   probably due to bile duct injury.  Known pancreatic mass is not well visualized due to surrounding   inflammation.           Care Discussed with Consultants/Other Providers:

## 2019-08-21 NOTE — PROGRESS NOTE ADULT - ATTENDING COMMENTS
advance diet? advance diet?  gi  "Continue IV antibiotics; will need 10 day course total, can convert to PO as outpatient  - advance diet as tolerated  - repeat CBC, CMP, INR  - monitor clinically for pain/hemodynamic status" advance diet?  gi  "Continue IV antibiotics; will need 10 day course total, can convert to PO as outpatient  - advance diet as tolerated  - repeat CBC, CMP, INR  - monitor clinically for pain/hemodynamic status"  Pancreatic Cancer to head of pancreas with mets to lung- with improving Bili with placement of biliary drain  Hyperbili -impr- 4.5 total bili  IR to internalize biliary drain today- then with see about diet - will restart with clears when ok by ir

## 2019-08-21 NOTE — PROGRESS NOTE ADULT - SUBJECTIVE AND OBJECTIVE BOX
SUBJECTIVE: no pain or SOB      MEDICATIONS  (STANDING):  amLODIPine   Tablet 10 milliGRAM(s) Oral daily  ergocalciferol 93858 Unit(s) Oral <User Schedule>  multivitamin 1 Tablet(s) Oral daily  piperacillin/tazobactam IVPB.. 3.375 Gram(s) IV Intermittent every 8 hours  polyethylene glycol 3350 17 Gram(s) Oral daily  sodium chloride 0.9%. 1000 milliLiter(s) (75 mL/Hr) IV Continuous <Continuous>    MEDICATIONS  (PRN):  ALBUTerol    90 MICROgram(s) HFA Inhaler 2 Puff(s) Inhalation every 6 hours PRN Shortness of Breath and/or Wheezing  morphine  - Injectable 2 milliGRAM(s) IV Push every 4 hours PRN Severe Pain (7 - 10)  ondansetron Injectable 4 milliGRAM(s) IV Push every 6 hours PRN Nausea and/or Vomiting  oxyCODONE    IR 5 milliGRAM(s) Oral every 6 hours PRN Moderate Pain (4 - 6) and severe pain      LABS:                     9.9    5.51  )-----------( 315      ( 21 Aug 2019 07:40 )             29.0   135  |  100  |  4<L>  ----------------------------<  97  4.0   |  23  |  0.53    Ca    8.5      21 Aug 2019 07:40    TPro  5.9<L>  /  Alb  2.9<L>  /  TBili  4.5<H>  /  DBili  x   /  AST  43<H>  /  ALT  57<H>  /  AlkPhos  443<H>  08-21    Creatinine Trend: 0.53<--, 0.58<--, 0.58<--, 0.59<--, 0.54<--, 0.72<--   PT/INR - ( 21 Aug 2019 07:40 )   PT: 13.2 SEC;   INR: 1.18       PHYSICAL EXAM  Vital Signs Last 24 Hrs  T(C): 36.9 (21 Aug 2019 06:00), Max: 37.2 (21 Aug 2019 02:00)  T(F): 98.4 (21 Aug 2019 06:00), Max: 99 (21 Aug 2019 02:00)  HR: 72 (21 Aug 2019 06:00) (66 - 81)  BP: 124/70 (21 Aug 2019 06:00) (120/67 - 133/81)  RR: 18 (21 Aug 2019 06:00) (18 - 18)  SpO2: 100% (21 Aug 2019 06:00) (99% - 100%)    Cardiovascular:  S1S2 RRR, No JVD  Respiratory: diminished at bases, normal effort  Gastrointestinal: Abdomen soft, ND, NT, +BS  Skin: Warm, dry, intact. No rash.  Musculoskeletal: Normal ROM, normal strength  Ext: No C/C/E B/L LE    DIAGNOSTIC DATA  EKG: NSR    < from: Transthoracic Echocardiogram (08.16.19 @ 15:34) >  CONCLUSIONS:  1. Calcified trileaflet aortic valve with normal opening.  2. Normal left ventricular internal dimensions and wall  thicknesses.  3. Overall preserved left ventricular systolic function.  Basal inferior and basal inferoseptal wall hypokinesis.  4. Normal right ventricular size and function.  ------------------------------------------------------------------------  Confirmed on  8/16/2019 - 17:06:36 by Jason Dee M.D. RPVI    < end of copied text >      Echo: Done as outpatient on 6/29/18 (full result placed in physical chart)  IMPRESSION: Normal LV size and systolic function. Estimated LVEF 60%. LV wall motion abnormality - hypokinesis of basal and inferoseptal segments as well as entire inferior wall. Remaining segments with normal wall motion. Grade 1 diastolic dysfunction. Normal RV size and function.    NST: Done in our office on 7/9/18 (full result placed in physical chart)  CONCLUSIONS: Normal Study .   Normal myocardial perfusion SPECT images.   Normal left ventricular size and function. Calculated EF is 63%.     ASSESSMENT AND PLAN:  Patient is a 68 year old female known to our office (Cardiologist - Dr. Cash) with PMHx of HTN, former smoker, anemia, COPD (with inhaler use per patient), PVD s/p RLE bypass in 2018 who had an echo with normal LV/RV function and a normal nuclear stress test in our office last year as preop for LE bypass now transferred from [facility]- for work up of pancreatic mass (path + adenoca) and lung biopsy.  s/p IR CBD stent and drain placement last PM    - No evidence of clinical HF or anginal symptoms  - Repeat echo noted/unchanged from prior  -Heme and surgery f/u noted, path c/w met panc ca to lungs  -no surgery being planned at this time  -OP chemo to be arrange  -no further cardiac work up planned

## 2019-08-21 NOTE — PROGRESS NOTE ADULT - ATTENDING COMMENTS
Patient seen and examined.  Agree with above.   TTE with normal LV function  No further inpatient cardiac workup needed at this time if no surgery being planned    Maximiliano Sarmiento MD

## 2019-08-21 NOTE — PROGRESS NOTE ADULT - PROBLEM SELECTOR PLAN 1
Pancreatic  head mass found at OSH, transferred for EUS- malignant duodenal head mass and pancreatic head mass - confirmed pancreatic adenocarcinoma   -CT chest concerning for metastatic disease - s/p biopsy on 8/15- confirmed mets  - surg-oncology- no role for surgery ????- ignacia ask for f/u

## 2019-08-21 NOTE — PROGRESS NOTE ADULT - SUBJECTIVE AND OBJECTIVE BOX
Chief Complaint:  Patient is a 68y old  Female who presents with a chief complaint of Pancreatic mass, Obstructive cholestatic liver disease, Urinary tract infection without Hematuria, Hypokalemia, PVD, Essential Hypertension. (20 Aug 2019 17:35)      Interval Events: No adverse events overnight.  Patient feeling better.    Allergies:  No Known Allergies      Hospital Medications:  ALBUTerol    90 MICROgram(s) HFA Inhaler 2 Puff(s) Inhalation every 6 hours PRN  amLODIPine   Tablet 10 milliGRAM(s) Oral daily  ergocalciferol 30869 Unit(s) Oral <User Schedule>  morphine  - Injectable 4 milliGRAM(s) IV Push every 6 hours PRN  multivitamin 1 Tablet(s) Oral daily  ondansetron Injectable 4 milliGRAM(s) IV Push every 6 hours PRN  oxyCODONE    IR 5 milliGRAM(s) Oral every 6 hours PRN  piperacillin/tazobactam IVPB.. 3.375 Gram(s) IV Intermittent every 8 hours  polyethylene glycol 3350 17 Gram(s) Oral daily  sodium chloride 0.9%. 1000 milliLiter(s) IV Continuous <Continuous>      PMHX/PSHX:  Iron deficiency anemia  Claudication  Anemia  Smoking history  HTN (hypertension)  History of colonoscopy  History of intravascular stent placement  History of  section      Family history:  Family history of cancer  Family history of hypertension      ROS:     General:  No wt loss, fevers, chills, night sweats, fatigue,   Eyes:  Good vision, no reported pain  ENT:  No sore throat, pain, runny nose, dysphagia  CV:  No pain, palpitations, hypo/hypertension  Resp:  No dyspnea, cough, tachypnea, wheezing  GI:  See HPI  :  No pain, bleeding, incontinence, nocturia  Muscle:  No pain, weakness  Neuro:  No weakness, tingling, memory problems  Psych:  No fatigue, insomnia, mood problems, depression  Endocrine:  No polyuria, polydipsia, cold/heat intolerance  Heme:  No petechiae, ecchymosis, easy bruisability  Skin:  No rash, edema      PHYSICAL EXAM:     GENERAL: NAD  HEENT:  NC/AT  CHEST:  Full & symmetric excursion, no increased effort  ABDOMEN:  Soft, non-tender, non-distended, +BS  EXTREMITIES:  no edema  SKIN:  No rash  NEURO:  Alert      Vital Signs:  Vital Signs Last 24 Hrs  T(C): 36.9 (21 Aug 2019 06:00), Max: 37.2 (21 Aug 2019 02:00)  T(F): 98.4 (21 Aug 2019 06:00), Max: 99 (21 Aug 2019 02:00)  HR: 72 (21 Aug 2019 06:00) (66 - 81)  BP: 124/70 (21 Aug 2019 06:00) (120/67 - 133/81)  BP(mean): --  RR: 18 (21 Aug 2019 06:00) (18 - 18)  SpO2: 100% (21 Aug 2019 06:00) (99% - 100%)  Daily     Daily     LABS:                        10.0   6.63  )-----------( 314      ( 20 Aug 2019 07:30 )             30.1         136  |  102  |  6<L>  ----------------------------<  106<H>  4.8   |  24  |  0.58    Ca    9.1      20 Aug 2019 07:30    TPro  6.2  /  Alb  3.1<L>  /  TBili  5.7<H>  /  DBili  x   /  AST  72<H>  /  ALT  72<H>  /  AlkPhos  519<H>      LIVER FUNCTIONS - ( 20 Aug 2019 07:30 )  Alb: 3.1 g/dL / Pro: 6.2 g/dL / ALK PHOS: 519 u/L / ALT: 72 u/L / AST: 72 u/L / GGT: x                   Imaging:  reviewed

## 2019-08-22 ENCOUNTER — TRANSCRIPTION ENCOUNTER (OUTPATIENT)
Age: 69
End: 2019-08-22

## 2019-08-22 LAB
ALBUMIN SERPL ELPH-MCNC: 2.8 G/DL — LOW (ref 3.3–5)
ALP SERPL-CCNC: 406 U/L — HIGH (ref 40–120)
ALT FLD-CCNC: 48 U/L — HIGH (ref 4–33)
ANION GAP SERPL CALC-SCNC: 10 MMO/L — SIGNIFICANT CHANGE UP (ref 7–14)
AST SERPL-CCNC: 37 U/L — HIGH (ref 4–32)
BILIRUB SERPL-MCNC: 4.1 MG/DL — HIGH (ref 0.2–1.2)
BUN SERPL-MCNC: 4 MG/DL — LOW (ref 7–23)
CALCIUM SERPL-MCNC: 8.7 MG/DL — SIGNIFICANT CHANGE UP (ref 8.4–10.5)
CHLORIDE SERPL-SCNC: 100 MMOL/L — SIGNIFICANT CHANGE UP (ref 98–107)
CO2 SERPL-SCNC: 24 MMOL/L — SIGNIFICANT CHANGE UP (ref 22–31)
CREAT SERPL-MCNC: 0.52 MG/DL — SIGNIFICANT CHANGE UP (ref 0.5–1.3)
GLUCOSE SERPL-MCNC: 88 MG/DL — SIGNIFICANT CHANGE UP (ref 70–99)
HCT VFR BLD CALC: 28.9 % — LOW (ref 34.5–45)
HGB BLD-MCNC: 9.4 G/DL — LOW (ref 11.5–15.5)
INR BLD: 1.11 — SIGNIFICANT CHANGE UP (ref 0.88–1.17)
MCHC RBC-ENTMCNC: 27 PG — SIGNIFICANT CHANGE UP (ref 27–34)
MCHC RBC-ENTMCNC: 32.5 % — SIGNIFICANT CHANGE UP (ref 32–36)
MCV RBC AUTO: 83 FL — SIGNIFICANT CHANGE UP (ref 80–100)
NRBC # FLD: 0.05 K/UL — SIGNIFICANT CHANGE UP (ref 0–0)
PLATELET # BLD AUTO: 346 K/UL — SIGNIFICANT CHANGE UP (ref 150–400)
PMV BLD: 10.6 FL — SIGNIFICANT CHANGE UP (ref 7–13)
POTASSIUM SERPL-MCNC: 4.2 MMOL/L — SIGNIFICANT CHANGE UP (ref 3.5–5.3)
POTASSIUM SERPL-SCNC: 4.2 MMOL/L — SIGNIFICANT CHANGE UP (ref 3.5–5.3)
PROT SERPL-MCNC: 6.1 G/DL — SIGNIFICANT CHANGE UP (ref 6–8.3)
PROTHROM AB SERPL-ACNC: 12.7 SEC — SIGNIFICANT CHANGE UP (ref 9.8–13.1)
RBC # BLD: 3.48 M/UL — LOW (ref 3.8–5.2)
RBC # FLD: 18.3 % — HIGH (ref 10.3–14.5)
SODIUM SERPL-SCNC: 134 MMOL/L — LOW (ref 135–145)
SPECIMEN SOURCE: SIGNIFICANT CHANGE UP
WBC # BLD: 5.83 K/UL — SIGNIFICANT CHANGE UP (ref 3.8–10.5)
WBC # FLD AUTO: 5.83 K/UL — SIGNIFICANT CHANGE UP (ref 3.8–10.5)

## 2019-08-22 PROCEDURE — 99232 SBSQ HOSP IP/OBS MODERATE 35: CPT | Mod: GC

## 2019-08-22 PROCEDURE — 47538 PERQ PLMT BILE DUCT STENT: CPT

## 2019-08-22 PROCEDURE — 99233 SBSQ HOSP IP/OBS HIGH 50: CPT

## 2019-08-22 RX ORDER — MORPHINE SULFATE 50 MG/1
2 CAPSULE, EXTENDED RELEASE ORAL EVERY 4 HOURS
Refills: 0 | Status: DISCONTINUED | OUTPATIENT
Start: 2019-08-22 | End: 2019-08-23

## 2019-08-22 RX ORDER — OXYCODONE HYDROCHLORIDE 5 MG/1
5 TABLET ORAL EVERY 6 HOURS
Refills: 0 | Status: DISCONTINUED | OUTPATIENT
Start: 2019-08-22 | End: 2019-08-23

## 2019-08-22 RX ADMIN — PIPERACILLIN AND TAZOBACTAM 25 GRAM(S): 4; .5 INJECTION, POWDER, LYOPHILIZED, FOR SOLUTION INTRAVENOUS at 13:39

## 2019-08-22 RX ADMIN — SODIUM CHLORIDE 75 MILLILITER(S): 9 INJECTION INTRAMUSCULAR; INTRAVENOUS; SUBCUTANEOUS at 13:39

## 2019-08-22 RX ADMIN — MORPHINE SULFATE 2 MILLIGRAM(S): 50 CAPSULE, EXTENDED RELEASE ORAL at 05:50

## 2019-08-22 RX ADMIN — SODIUM CHLORIDE 75 MILLILITER(S): 9 INJECTION INTRAMUSCULAR; INTRAVENOUS; SUBCUTANEOUS at 05:39

## 2019-08-22 RX ADMIN — MORPHINE SULFATE 2 MILLIGRAM(S): 50 CAPSULE, EXTENDED RELEASE ORAL at 05:37

## 2019-08-22 RX ADMIN — AMLODIPINE BESYLATE 10 MILLIGRAM(S): 2.5 TABLET ORAL at 05:37

## 2019-08-22 RX ADMIN — PIPERACILLIN AND TAZOBACTAM 25 GRAM(S): 4; .5 INJECTION, POWDER, LYOPHILIZED, FOR SOLUTION INTRAVENOUS at 21:45

## 2019-08-22 RX ADMIN — PIPERACILLIN AND TAZOBACTAM 25 GRAM(S): 4; .5 INJECTION, POWDER, LYOPHILIZED, FOR SOLUTION INTRAVENOUS at 05:37

## 2019-08-22 NOTE — PROGRESS NOTE ADULT - PROBLEM SELECTOR PLAN 2
likely in setting of pancreatic mass,   Eus 8/12- with pancreatic adenocarcinoma   - stent on 8/19, given elevated bilirubin of 8.3- s/p shaquille drain tube -now 4.1- int of stent today  advance diet

## 2019-08-22 NOTE — DISCHARGE NOTE PROVIDER - CARE PROVIDER_API CALL
Tong Epps)  Hematology; Medical Oncology  [address] [place] [address]  Phone: [phone]  Fax: [phone]  Follow Up Time: Tong Epps)  Hematology; Medical Oncology  [address] [place] [address]  Phone: [phone]  Fax: [phone]  Follow Up Time:     Farhan Pearson)  Gastroenterology; Internal Medicine  [address] [place] [address], [facility] [address]  [place] [address]  Phone: [phone]  Fax: [phone]  Follow Up Time:     Primary Care Provider,   Phone: (   )    -  Fax: (   )    -  Follow Up Time: 1 week

## 2019-08-22 NOTE — DISCHARGE NOTE PROVIDER - CARE PROVIDERS DIRECT ADDRESSES
,[email or web address]

## 2019-08-22 NOTE — DISCHARGE NOTE PROVIDER - NSDCFUADDAPPT_GEN_ALL_CORE_FT
Dr. Tong Epps: [facility], Monday 8/26/19, please call to confirm appointment time    Interventional Radiology, [facility]: Wednesday 8/28/19 at 2:30pm

## 2019-08-22 NOTE — PROGRESS NOTE ADULT - ATTENDING COMMENTS
Agree with above  No further inpatient cardiac workup needed at this time.     Maximiliano Sarmiento MD

## 2019-08-22 NOTE — DISCHARGE NOTE PROVIDER - HOSPITAL COURSE
68 year old female, with past history significant for HTN, Anemia, Smoking, PVD, Claudication of R-LE, and RLE stent placement, presented to the floor, as direct transfer from [facility]- secondary to need for "EUS and possible biopsy of pancreatic mass."  Seen and evaluated at bedside; NAD.  Patient relates being contacted by her PMD and advised to present to the hospital ED for evaluation after findings of abnormal liver function tests.  Prior to then, patient notes persistent mid abdominal pain, without radiation, for the past ~ 2 to 3 weeks, and associated with nausea, but no vomiting.  Indicates weight loss of 47 pounds from March 2019 to June 2019.  This was in the setting of decreased oral intake; patient would have appetite for various foods, but would feel full on attempted eating.  Also relates persistent diarrhea, for which she was prescribed loperamide PRN.  Reports chronic intermittent chills, which was presumed to be due to previously diagnosed anemia.  No reports of fevers, sweating, headaches, dizziness, chest pain, shortness of breath, vomiting.    HOSPITAL COURSE:    Transfer from  for EUS with GI    EUS was done  and Bxp showed Adenoca of Pancreas.    Onc-Sueg was consulted bit req Bxp of Lung Lesion.    Lung nodule bx was done by IR and also was c/w Adeno ca from Pancreas. Onc -surg s/o and Oncology was consulted- rec Ir help with dec of bilirubin    Ir dis external biliary drain- with dec bili from 8 to 4.1.    On 8/22/19 IR again did Biliary int stents and again asses her ext drain- with plan to cap Ext drain 8/22/19 PM and if still successfully draining will keep caped or remove for discharge with out patient f/u with Oncology at [facility] for treatment.

## 2019-08-22 NOTE — PROGRESS NOTE ADULT - SUBJECTIVE AND OBJECTIVE BOX
SUBJECTIVE: no pain or SOB      MEDICATIONS  (STANDING):  amLODIPine   Tablet 10 milliGRAM(s) Oral daily  ergocalciferol 42666 Unit(s) Oral <User Schedule>  multivitamin 1 Tablet(s) Oral daily  piperacillin/tazobactam IVPB.. 3.375 Gram(s) IV Intermittent every 8 hours  polyethylene glycol 3350 17 Gram(s) Oral daily  sodium chloride 0.9%. 1000 milliLiter(s) (75 mL/Hr) IV Continuous <Continuous>    MEDICATIONS  (PRN):  ALBUTerol    90 MICROgram(s) HFA Inhaler 2 Puff(s) Inhalation every 6 hours PRN Shortness of Breath and/or Wheezing  morphine  - Injectable 2 milliGRAM(s) IV Push every 4 hours PRN Severe Pain (7 - 10)  ondansetron Injectable 4 milliGRAM(s) IV Push every 6 hours PRN Nausea and/or Vomiting  oxyCODONE    IR 5 milliGRAM(s) Oral every 6 hours PRN Moderate Pain (4 - 6) and severe pain      LABS:                       9.4    5.83  )-----------( 346      ( 22 Aug 2019 05:37 )             28.9     134<L>  |  100  |  4<L>  ----------------------------<  88  4.2   |  24  |  0.52    Ca    8.7      22 Aug 2019 05:37    TPro  6.1  /  Alb  2.8<L>  /  TBili  4.1<H>  /  DBili  x   /  AST  37<H>  /  ALT  48<H>  /  AlkPhos  406<H>  08-22    Creatinine Trend: 0.52<--, 0.53<--, 0.58<--, 0.58<--, 0.59<--, 0.54<--   PT/INR - ( 22 Aug 2019 05:37 )   PT: 12.7 SEC;   INR: 1.11         PHYSICAL EXAM  Vital Signs Last 24 Hrs  T(C): 36.7 (22 Aug 2019 13:47), Max: 37.3 (21 Aug 2019 15:29)  T(F): 98 (22 Aug 2019 13:47), Max: 99.1 (21 Aug 2019 15:29)  HR: 59 (22 Aug 2019 13:47) (59 - 101)  BP: 115/67 (22 Aug 2019 13:47) (115/67 - 133/74)  RR: 20 (22 Aug 2019 13:47) (17 - 20)  SpO2: 99% (22 Aug 2019 13:47) (98% - 100%)    Cardiovascular:  S1S2 RRR, No JVD  Respiratory: diminished at bases, normal effort  Gastrointestinal: Abdomen soft, ND, NT, +BS  Skin: Warm, dry, intact. No rash.  Musculoskeletal: Normal ROM, normal strength  Ext: No C/C/E B/L LE    DIAGNOSTIC DATA  EKG: NSR    < from: Transthoracic Echocardiogram (08.16.19 @ 15:34) >  CONCLUSIONS:  1. Calcified trileaflet aortic valve with normal opening.  2. Normal left ventricular internal dimensions and wall  thicknesses.  3. Overall preserved left ventricular systolic function.  Basal inferior and basal inferoseptal wall hypokinesis.  4. Normal right ventricular size and function.  ------------------------------------------------------------------------  Confirmed on  8/16/2019 - 17:06:36 by Jason Dee M.D. RPVI    < end of copied text >      Echo: Done as outpatient on 6/29/18 (full result placed in physical chart)  IMPRESSION: Normal LV size and systolic function. Estimated LVEF 60%. LV wall motion abnormality - hypokinesis of basal and inferoseptal segments as well as entire inferior wall. Remaining segments with normal wall motion. Grade 1 diastolic dysfunction. Normal RV size and function.    NST: Done in our office on 7/9/18 (full result placed in physical chart)  CONCLUSIONS: Normal Study .   Normal myocardial perfusion SPECT images.   Normal left ventricular size and function. Calculated EF is 63%.     ASSESSMENT AND PLAN:  Patient is a 68 year old female known to our office (Cardiologist - Dr. Cash) with PMHx of HTN, former smoker, anemia, COPD (with inhaler use per patient), PVD s/p RLE bypass in 2018 who had an echo with normal LV/RV function and a normal nuclear stress test in our office last year as preop for LE bypass now transferred from [facility]- for work up of pancreatic mass (path + adenoca) and lung biopsy.  s/p IR CBD stent and drain placement last PM    - No evidence of clinical HF or anginal symptoms  - Repeat echo noted/unchanged from prior  -Heme and surgery f/u noted, path c/w met panc ca to lungs  -no surgery being planned at this time  -OP chemo to be arrange  -no further cardiac work up planned

## 2019-08-22 NOTE — PROGRESS NOTE ADULT - SUBJECTIVE AND OBJECTIVE BOX
Vascular & Interventional Radiology Post-Procedure Note    Pre-Procedure Diagnosis: Biliary Obstruction  Post-Procedure Diagnosis: Same as pre.  Indications for Procedure: Jaundice    : Noam MESA  Assistant(s): Tony MESA    Procedure Details/Findings: Succesful placement of a new 10mm x 60mm self expanding stent within the previously placed stent, balloon dilation with 4mm balloon and placement of an external drainage catheter. No biliary leak is identified.    Complications: None  Estimated Blood Loss: Minimal  Specimen: N/A  Contrast: See Report  Sedation: MAC  Patient Condition/Disposition: Stable. IRS then Floor    Plan:     Will cap the external drainage catheter in afternoon today or AM tomorrow. If patient passes capping trial will remove external drainage catheter (can be performed as outpatient).       Zhao Jimenez MD  PGY-6, Interventional Radiology

## 2019-08-22 NOTE — DISCHARGE NOTE PROVIDER - PROVIDER TOKENS
PROVIDER:[TOKEN:[[id number]]] PROVIDER:[TOKEN:[[id number]]],PROVIDER:[TOKEN:[[id number]]],FREE:[LAST:[Primary Care Provider],PHONE:[(   )    -],FAX:[(   )    -],FOLLOWUP:[1 week]]

## 2019-08-22 NOTE — DISCHARGE NOTE PROVIDER - NSDCFUSCHEDAPPT_GEN_ALL_CORE_FT
OCHOA CATES ; 08/26/2019 ; [facility]  OCHOA CATES ; 10/14/2019 ; [facility] [address] OCHOA CATES ; 08/26/2019 ; [facility]  OCHOA CATES ; 10/14/2019 ; [facility] [address] OCHOA CATES ; 08/26/2019 ; [facility]  OCHOA CATES ; 10/14/2019 ; [facility] [address] OCHOA CATES ; 08/26/2019 ; [facility]  OCHOA CATES ; 10/14/2019 ; [facility] [address] OCHOA CATES ; 08/26/2019 ; [facility]  OCHOA CATES ; 10/14/2019 ; [facility] [address]

## 2019-08-22 NOTE — PROGRESS NOTE ADULT - ASSESSMENT
68f with 2 weeks of diarrhea and now with pain to the ruq with multiple stones rto the ruq, found to have a Pancreatic  head mass at OSH and obstructive jaundice, transferred for EUS. Pt, underwent EUS with biopsy on 8/12, which showed pancreatic adenocarinoma, she was also found to have a  CT chest concerning for metastatic disease, s/p IR biopsy on 8/15.  UTI- completed iv ceftriaxone x3 days    Pt. with uptrending Tbili, now at 8.8 on adm-  s/p GI procedure on 8/19 for stent- attempted with no success had  ext drain placed by ir  L arm iv pain- reloved  Lung Bxp c/w Pancreatic CA on cytology  Pancreatic Cancer to head of pancreas with mets to lung- with improving Bili with placement of biliary drain  Hyperbili -impr- 4.1 total bili- improving- gi to do internalization of drain today

## 2019-08-22 NOTE — PROGRESS NOTE ADULT - ASSESSMENT
68 year old female, with past history significant for HTN, Anemia, Smoking, PVD, Claudication of R-LE, and RLE stent placement, who presents for evaluation of pancreatic mass from [facility].    # Bile leak s/p CBD stent, internal-external catheter placement by IR  #pancreatic mass with obstructive jaundice - path consistent with pdac  # PVD with RLE stent  #UTI on abx    Recs:  - Continue IV antibiotics while inpatient; will need 10 day course total, can convert to PO as outpatient  - appreciate IR assistance with drain placement  - repeat CBC, CMP, INR  - monitor clinically for pain/hemodynamic status  - ok for discharge from GI perspective

## 2019-08-22 NOTE — PROGRESS NOTE ADULT - SUBJECTIVE AND OBJECTIVE BOX
Patient is a 68y old  Female who presents with a chief complaint of Pancreatic mass, Obstructive cholestatic liver disease, Urinary tract infection without Hematuria, Hypokalemia, PVD, Essential Hypertension. (22 Aug 2019 11:17)      SUBJECTIVE / OVERNIGHT EVENTS:  int of ir drain today    MEDICATIONS  (STANDING):  amLODIPine   Tablet 10 milliGRAM(s) Oral daily  ergocalciferol 42490 Unit(s) Oral <User Schedule>  multivitamin 1 Tablet(s) Oral daily  piperacillin/tazobactam IVPB.. 3.375 Gram(s) IV Intermittent every 8 hours  polyethylene glycol 3350 17 Gram(s) Oral daily  sodium chloride 0.9%. 1000 milliLiter(s) (75 mL/Hr) IV Continuous <Continuous>    MEDICATIONS  (PRN):  ALBUTerol    90 MICROgram(s) HFA Inhaler 2 Puff(s) Inhalation every 6 hours PRN Shortness of Breath and/or Wheezing  morphine  - Injectable 2 milliGRAM(s) IV Push every 4 hours PRN Severe Pain (7 - 10)  ondansetron Injectable 4 milliGRAM(s) IV Push every 6 hours PRN Nausea and/or Vomiting  oxyCODONE    IR 5 milliGRAM(s) Oral every 6 hours PRN Moderate Pain (4 - 6) and severe pain        I&O's Summary    21 Aug 2019 07:01  -  22 Aug 2019 07:00  --------------------------------------------------------  IN: 0 mL / OUT: 620 mL / NET: -620 mL      Vital Signs Last 24 Hrs  T(C): 36.8 (22 Aug 2019 05:27), Max: 37.3 (21 Aug 2019 15:29)  T(F): 98.2 (22 Aug 2019 05:27), Max: 99.1 (21 Aug 2019 15:29)  HR: 80 (22 Aug 2019 05:27) (76 - 101)  BP: 133/74 (22 Aug 2019 05:27) (122/71 - 133/74)  BP(mean): --  RR: 18 (22 Aug 2019 05:27) (17 - 18)  SpO2: 98% (22 Aug 2019 05:27) (98% - 100%)  PHYSICAL EXAM:  GENERAL: NAD, well-developed  HEAD:  Atraumatic, Normocephalic  EYES: EOMI, PERRLA, conjunctiva and sclera clear  NECK: Supple, No JVD  CHEST/LUNG: Clear to auscultation bilaterally; No wheeze  HEART: Regular rate and rhythm; No murmurs, rubs, or gallops  ABDOMEN: Soft, Nontender, Nondistended; Bowel sounds present  EXTREMITIES:  2+ Peripheral Pulses, No clubbing, cyanosis, or edema  PSYCH: AAOx3  NEUROLOGY: non-focal  SKIN: No rashes or lesions- mildly jaundiced    LABS:                        9.4    5.83  )-----------( 346      ( 22 Aug 2019 05:37 )             28.9     08-22    134<L>  |  100  |  4<L>  ----------------------------<  88  4.2   |  24  |  0.52    Ca    8.7      22 Aug 2019 05:37    TPro  6.1  /  Alb  2.8<L>  /  TBili  4.1<H>  /  DBili  x   /  AST  37<H>  /  ALT  48<H>  /  AlkPhos  406<H>  08-22    PT/INR - ( 22 Aug 2019 05:37 )   PT: 12.7 SEC;   INR: 1.11                    RADIOLOGY & ADDITIONAL TESTS:    Imaging Personally Reviewed:    Consultant(s) Notes Reviewed:      Care Discussed with Consultants/Other Providers:

## 2019-08-22 NOTE — PROGRESS NOTE ADULT - SUBJECTIVE AND OBJECTIVE BOX
Chief Complaint:  Patient is a 68y old  Female who presents with a chief complaint of Pancreatic mass, Obstructive cholestatic liver disease, Urinary tract infection without Hematuria, Hypokalemia, PVD, Essential Hypertension. (21 Aug 2019 14:08)      Interval Events: Pt doing ok, denies complaints.  Getting drain internalization today by IR.    Allergies:  No Known Allergies      Hospital Medications:  ALBUTerol    90 MICROgram(s) HFA Inhaler 2 Puff(s) Inhalation every 6 hours PRN  amLODIPine   Tablet 10 milliGRAM(s) Oral daily  ergocalciferol 69341 Unit(s) Oral <User Schedule>  morphine  - Injectable 2 milliGRAM(s) IV Push every 4 hours PRN  multivitamin 1 Tablet(s) Oral daily  ondansetron Injectable 4 milliGRAM(s) IV Push every 6 hours PRN  oxyCODONE    IR 5 milliGRAM(s) Oral every 6 hours PRN  piperacillin/tazobactam IVPB.. 3.375 Gram(s) IV Intermittent every 8 hours  polyethylene glycol 3350 17 Gram(s) Oral daily  sodium chloride 0.9%. 1000 milliLiter(s) IV Continuous <Continuous>      PMHX/PSHX:  Iron deficiency anemia  Claudication  Anemia  Smoking history  HTN (hypertension)  History of colonoscopy  History of intravascular stent placement  History of  section      Family history:  Family history of cancer  Family history of hypertension      ROS:     General:  No wt loss, fevers, chills, night sweats, fatigue,   Eyes:  Good vision, no reported pain  ENT:  No sore throat, pain, runny nose, dysphagia  CV:  No pain, palpitations, hypo/hypertension  Resp:  No dyspnea, cough, tachypnea, wheezing  GI:  See HPI  :  No pain, bleeding, incontinence, nocturia  Muscle:  No pain, weakness  Neuro:  No weakness, tingling, memory problems  Psych:  No fatigue, insomnia, mood problems, depression  Endocrine:  No polyuria, polydipsia, cold/heat intolerance  Heme:  No petechiae, ecchymosis, easy bruisability  Skin:  No rash, edema      PHYSICAL EXAM:     GENERAL: NAD  HEENT:  NC/AT  CHEST:  Full & symmetric excursion, no increased effort  ABDOMEN:  Soft, TTP in RUQ, non-distended, +BS  EXTREMITIES:  no edema  SKIN:  No rash  NEURO:  Alert      Vital Signs:  Vital Signs Last 24 Hrs  T(C): 36.8 (22 Aug 2019 05:27), Max: 37.3 (21 Aug 2019 15:29)  T(F): 98.2 (22 Aug 2019 05:27), Max: 99.1 (21 Aug 2019 15:29)  HR: 80 (22 Aug 2019 05:27) (76 - 101)  BP: 133/74 (22 Aug 2019 05:27) (122/71 - 133/74)  BP(mean): --  RR: 18 (22 Aug 2019 05:27) (17 - 18)  SpO2: 98% (22 Aug 2019 05:27) (98% - 100%)  Daily     Daily     LABS:                        9.9    5.51  )-----------( 315      ( 21 Aug 2019 07:40 )             29.0     08-    135  |  100  |  4<L>  ----------------------------<  97  4.0   |  23  |  0.53    Ca    8.5      21 Aug 2019 07:40    TPro  5.9<L>  /  Alb  2.9<L>  /  TBili  4.5<H>  /  DBili  x   /  AST  43<H>  /  ALT  57<H>  /  AlkPhos  443<H>  08    LIVER FUNCTIONS - ( 21 Aug 2019 07:40 )  Alb: 2.9 g/dL / Pro: 5.9 g/dL / ALK PHOS: 443 u/L / ALT: 57 u/L / AST: 43 u/L / GGT: x           PT/INR - ( 21 Aug 2019 07:40 )   PT: 13.2 SEC;   INR: 1.18                  Imaging:  reviewed

## 2019-08-22 NOTE — DISCHARGE NOTE PROVIDER - NSDCCPCAREPLAN_GEN_ALL_CORE_FT
PRINCIPAL DISCHARGE DIAGNOSIS  Diagnosis: Adenocarcinoma of pancreas, stage 4  Assessment and Plan of Treatment: pancreatic head mass with mets to lungs      SECONDARY DISCHARGE DIAGNOSES  Diagnosis: Hyperbilirubinemia  Assessment and Plan of Treatment: improving with biliary stents- f/i bili with Oncology PRINCIPAL DISCHARGE DIAGNOSIS  Diagnosis: Adenocarcinoma of pancreas, stage 4  Assessment and Plan of Treatment: You underwent a biopsy of the pancreas and lung and diagnosed with metastatic pancreatic. You have an outpatient appointment scheduled for Monday with Dr. Epps at [facility]. Please call [phone] to confirm appointment time.      SECONDARY DISCHARGE DIAGNOSES  Diagnosis: Hyperbilirubinemia  Assessment and Plan of Treatment: You had biliary stent placed and it was capped on 8/23/19. Please follow up with Interventional Radiology on Wednesday 8/28/29 at 2:30pm. Please also follow up with gastroenterology as outpatient Dr. Pearson and call to schedule an appointment. PRINCIPAL DISCHARGE DIAGNOSIS  Diagnosis: Adenocarcinoma of pancreas, stage 4  Assessment and Plan of Treatment: You underwent a biopsy of the pancreas and lung and diagnosed with metastatic pancreatic. You have an outpatient appointment scheduled for Monday with Dr. Epps at [facility]. Please call [phone] to confirm appointment time. Please also continue prophylactic antibiotics (ciprofloxacin 500mg twice per day through 8/28/19.      SECONDARY DISCHARGE DIAGNOSES  Diagnosis: Hyperbilirubinemia  Assessment and Plan of Treatment: You had biliary stent placed and it was capped on 8/23/19. Please follow up with Interventional Radiology on Wednesday 8/28/29 at 2:30pm. Please also follow up with gastroenterology as outpatient Dr. Pearson and call to schedule an appointment.

## 2019-08-23 ENCOUNTER — TRANSCRIPTION ENCOUNTER (OUTPATIENT)
Age: 69
End: 2019-08-23

## 2019-08-23 VITALS
DIASTOLIC BLOOD PRESSURE: 80 MMHG | OXYGEN SATURATION: 100 % | HEART RATE: 98 BPM | RESPIRATION RATE: 18 BRPM | TEMPERATURE: 98 F | SYSTOLIC BLOOD PRESSURE: 126 MMHG

## 2019-08-23 DIAGNOSIS — K83.1 OBSTRUCTION OF BILE DUCT: ICD-10-CM

## 2019-08-23 LAB
ALBUMIN SERPL ELPH-MCNC: 3 G/DL — LOW (ref 3.3–5)
ALP SERPL-CCNC: 367 U/L — HIGH (ref 40–120)
ALT FLD-CCNC: 40 U/L — HIGH (ref 4–33)
ANION GAP SERPL CALC-SCNC: 13 MMO/L — SIGNIFICANT CHANGE UP (ref 7–14)
APTT BLD: 28 SEC — SIGNIFICANT CHANGE UP (ref 27.5–36.3)
AST SERPL-CCNC: 28 U/L — SIGNIFICANT CHANGE UP (ref 4–32)
BASOPHILS # BLD AUTO: 0.01 K/UL — SIGNIFICANT CHANGE UP (ref 0–0.2)
BASOPHILS NFR BLD AUTO: 0.1 % — SIGNIFICANT CHANGE UP (ref 0–2)
BILIRUB SERPL-MCNC: 3.7 MG/DL — HIGH (ref 0.2–1.2)
BUN SERPL-MCNC: 7 MG/DL — SIGNIFICANT CHANGE UP (ref 7–23)
CALCIUM SERPL-MCNC: 8.8 MG/DL — SIGNIFICANT CHANGE UP (ref 8.4–10.5)
CHLORIDE SERPL-SCNC: 99 MMOL/L — SIGNIFICANT CHANGE UP (ref 98–107)
CO2 SERPL-SCNC: 23 MMOL/L — SIGNIFICANT CHANGE UP (ref 22–31)
CREAT SERPL-MCNC: 0.58 MG/DL — SIGNIFICANT CHANGE UP (ref 0.5–1.3)
EOSINOPHIL # BLD AUTO: 0.05 K/UL — SIGNIFICANT CHANGE UP (ref 0–0.5)
EOSINOPHIL NFR BLD AUTO: 0.6 % — SIGNIFICANT CHANGE UP (ref 0–6)
GLUCOSE SERPL-MCNC: 120 MG/DL — HIGH (ref 70–99)
HCT VFR BLD CALC: 28 % — LOW (ref 34.5–45)
HGB BLD-MCNC: 9.3 G/DL — LOW (ref 11.5–15.5)
IMM GRANULOCYTES NFR BLD AUTO: 0.4 % — SIGNIFICANT CHANGE UP (ref 0–1.5)
INR BLD: 1.05 — SIGNIFICANT CHANGE UP (ref 0.88–1.17)
LYMPHOCYTES # BLD AUTO: 1.91 K/UL — SIGNIFICANT CHANGE UP (ref 1–3.3)
LYMPHOCYTES # BLD AUTO: 24 % — SIGNIFICANT CHANGE UP (ref 13–44)
MAGNESIUM SERPL-MCNC: 1.6 MG/DL — SIGNIFICANT CHANGE UP (ref 1.6–2.6)
MCHC RBC-ENTMCNC: 27.2 PG — SIGNIFICANT CHANGE UP (ref 27–34)
MCHC RBC-ENTMCNC: 33.2 % — SIGNIFICANT CHANGE UP (ref 32–36)
MCV RBC AUTO: 81.9 FL — SIGNIFICANT CHANGE UP (ref 80–100)
MONOCYTES # BLD AUTO: 0.56 K/UL — SIGNIFICANT CHANGE UP (ref 0–0.9)
MONOCYTES NFR BLD AUTO: 7 % — SIGNIFICANT CHANGE UP (ref 2–14)
NEUTROPHILS # BLD AUTO: 5.41 K/UL — SIGNIFICANT CHANGE UP (ref 1.8–7.4)
NEUTROPHILS NFR BLD AUTO: 67.9 % — SIGNIFICANT CHANGE UP (ref 43–77)
NRBC # FLD: 0 K/UL — SIGNIFICANT CHANGE UP (ref 0–0)
PHOSPHATE SERPL-MCNC: 2.8 MG/DL — SIGNIFICANT CHANGE UP (ref 2.5–4.5)
PLATELET # BLD AUTO: 331 K/UL — SIGNIFICANT CHANGE UP (ref 150–400)
PMV BLD: 9.6 FL — SIGNIFICANT CHANGE UP (ref 7–13)
POTASSIUM SERPL-MCNC: 3.7 MMOL/L — SIGNIFICANT CHANGE UP (ref 3.5–5.3)
POTASSIUM SERPL-SCNC: 3.7 MMOL/L — SIGNIFICANT CHANGE UP (ref 3.5–5.3)
PROT SERPL-MCNC: 6.2 G/DL — SIGNIFICANT CHANGE UP (ref 6–8.3)
PROTHROM AB SERPL-ACNC: 11.7 SEC — SIGNIFICANT CHANGE UP (ref 9.8–13.1)
RBC # BLD: 3.42 M/UL — LOW (ref 3.8–5.2)
RBC # FLD: 17.8 % — HIGH (ref 10.3–14.5)
SODIUM SERPL-SCNC: 135 MMOL/L — SIGNIFICANT CHANGE UP (ref 135–145)
WBC # BLD: 7.97 K/UL — SIGNIFICANT CHANGE UP (ref 3.8–10.5)
WBC # FLD AUTO: 7.97 K/UL — SIGNIFICANT CHANGE UP (ref 3.8–10.5)

## 2019-08-23 PROCEDURE — 99232 SBSQ HOSP IP/OBS MODERATE 35: CPT | Mod: GC

## 2019-08-23 PROCEDURE — 99239 HOSP IP/OBS DSCHRG MGMT >30: CPT

## 2019-08-23 RX ORDER — CIPROFLOXACIN LACTATE 400MG/40ML
1 VIAL (ML) INTRAVENOUS
Qty: 11 | Refills: 0
Start: 2019-08-23 | End: 2019-08-27

## 2019-08-23 RX ORDER — AMLODIPINE BESYLATE 2.5 MG/1
1 TABLET ORAL
Qty: 0 | Refills: 0 | DISCHARGE
Start: 2019-08-23

## 2019-08-23 RX ORDER — POLYETHYLENE GLYCOL 3350 17 G/17G
17 POWDER, FOR SOLUTION ORAL
Qty: 17 | Refills: 0
Start: 2019-08-23 | End: 2019-09-21

## 2019-08-23 RX ORDER — ALBUTEROL 90 UG/1
2 AEROSOL, METERED ORAL
Qty: 0 | Refills: 0 | DISCHARGE

## 2019-08-23 RX ORDER — ONDANSETRON 8 MG/1
2 TABLET, FILM COATED ORAL
Qty: 0 | Refills: 0 | DISCHARGE

## 2019-08-23 RX ORDER — MORPHINE SULFATE 50 MG/1
0 CAPSULE, EXTENDED RELEASE ORAL
Qty: 0 | Refills: 0 | DISCHARGE

## 2019-08-23 RX ORDER — ERGOCALCIFEROL 1.25 MG/1
1 CAPSULE ORAL
Qty: 5 | Refills: 0
Start: 2019-08-23 | End: 2019-09-26

## 2019-08-23 RX ORDER — ONDANSETRON 8 MG/1
1 TABLET, FILM COATED ORAL
Qty: 12 | Refills: 0
Start: 2019-08-23 | End: 2019-08-25

## 2019-08-23 RX ORDER — CEFTRIAXONE 500 MG/1
0 INJECTION, POWDER, FOR SOLUTION INTRAMUSCULAR; INTRAVENOUS
Qty: 0 | Refills: 0 | DISCHARGE

## 2019-08-23 RX ORDER — AMLODIPINE BESYLATE 2.5 MG/1
1 TABLET ORAL
Qty: 0 | Refills: 0 | DISCHARGE

## 2019-08-23 RX ORDER — LOPERAMIDE HCL 2 MG
1 TABLET ORAL
Qty: 0 | Refills: 0 | DISCHARGE

## 2019-08-23 RX ORDER — OXYCODONE HYDROCHLORIDE 5 MG/1
1 TABLET ORAL
Qty: 6 | Refills: 0
Start: 2019-08-23 | End: 2019-08-24

## 2019-08-23 RX ADMIN — PIPERACILLIN AND TAZOBACTAM 25 GRAM(S): 4; .5 INJECTION, POWDER, LYOPHILIZED, FOR SOLUTION INTRAVENOUS at 05:59

## 2019-08-23 RX ADMIN — AMLODIPINE BESYLATE 10 MILLIGRAM(S): 2.5 TABLET ORAL at 06:00

## 2019-08-23 RX ADMIN — ONDANSETRON 4 MILLIGRAM(S): 8 TABLET, FILM COATED ORAL at 13:24

## 2019-08-23 RX ADMIN — SODIUM CHLORIDE 75 MILLILITER(S): 9 INJECTION INTRAMUSCULAR; INTRAVENOUS; SUBCUTANEOUS at 05:59

## 2019-08-23 RX ADMIN — Medication 1 TABLET(S): at 11:48

## 2019-08-23 NOTE — PROGRESS NOTE ADULT - PROBLEM SELECTOR PLAN 3
continue home meds

## 2019-08-23 NOTE — PROGRESS NOTE ADULT - SUBJECTIVE AND OBJECTIVE BOX
Chief Complaint:  Patient is a 68y old  Female who presents with a chief complaint of Pancreatic mass, Obstructive cholestatic liver disease, Urinary tract infection without Hematuria, Hypokalemia, PVD, Essential Hypertension. (22 Aug 2019 15:46)      Interval Events: No adverse events overnight.  Likely home today.    Allergies:  No Known Allergies      Hospital Medications:  ALBUTerol    90 MICROgram(s) HFA Inhaler 2 Puff(s) Inhalation every 6 hours PRN  amLODIPine   Tablet 10 milliGRAM(s) Oral daily  ergocalciferol 90246 Unit(s) Oral <User Schedule>  morphine  - Injectable 2 milliGRAM(s) IV Push every 4 hours PRN  multivitamin 1 Tablet(s) Oral daily  ondansetron Injectable 4 milliGRAM(s) IV Push every 6 hours PRN  oxyCODONE    IR 5 milliGRAM(s) Oral every 6 hours PRN  piperacillin/tazobactam IVPB.. 3.375 Gram(s) IV Intermittent every 8 hours  polyethylene glycol 3350 17 Gram(s) Oral daily  sodium chloride 0.9%. 1000 milliLiter(s) IV Continuous <Continuous>      PMHX/PSHX:  Iron deficiency anemia  Claudication  Anemia  Smoking history  HTN (hypertension)  History of colonoscopy  History of intravascular stent placement  History of  section      Family history:  Family history of cancer  Family history of hypertension      ROS:     General:  No wt loss, fevers, chills, night sweats, fatigue,   Eyes:  Good vision, no reported pain  ENT:  No sore throat, pain, runny nose, dysphagia  CV:  No pain, palpitations, hypo/hypertension  Resp:  No dyspnea, cough, tachypnea, wheezing  GI:  See HPI  :  No pain, bleeding, incontinence, nocturia  Muscle:  No pain, weakness  Neuro:  No weakness, tingling, memory problems  Psych:  No fatigue, insomnia, mood problems, depression  Endocrine:  No polyuria, polydipsia, cold/heat intolerance  Heme:  No petechiae, ecchymosis, easy bruisability  Skin:  No rash, edema      PHYSICAL EXAM:     GENERAL: NAD  HEENT:  NC/AT  CHEST:  Full & symmetric excursion, no increased effort  ABDOMEN:  Soft, non-tender, non-distended, +BS  EXTREMITIES:  no edema  SKIN:  No rash  NEURO:  Alert    Vital Signs:  Vital Signs Last 24 Hrs  T(C): 36.7 (23 Aug 2019 05:57), Max: 36.7 (22 Aug 2019 13:47)  T(F): 98 (23 Aug 2019 05:57), Max: 98 (22 Aug 2019 13:47)  HR: 87 (23 Aug 2019 05:57) (59 - 87)  BP: 143/82 (23 Aug 2019 05:57) (100/61 - 143/82)  BP(mean): --  RR: 17 (23 Aug 2019 05:57) (17 - 20)  SpO2: 98% (23 Aug 2019 05:57) (97% - 99%)  Daily     Daily     LABS:                        9.3    7.97  )-----------( 331      ( 23 Aug 2019 04:30 )             28.0     0823    135  |  99  |  7   ----------------------------<  120<H>  3.7   |  23  |  0.58    Ca    8.8      23 Aug 2019 04:30  Phos  2.8       Mg     1.6         TPro  6.2  /  Alb  3.0<L>  /  TBili  3.7<H>  /  DBili  x   /  AST  28  /  ALT  40<H>  /  AlkPhos  367<H>  0823    LIVER FUNCTIONS - ( 23 Aug 2019 04:30 )  Alb: 3.0 g/dL / Pro: 6.2 g/dL / ALK PHOS: 367 u/L / ALT: 40 u/L / AST: 28 u/L / GGT: x           PT/INR - ( 23 Aug 2019 04:30 )   PT: 11.7 SEC;   INR: 1.05          PTT - ( 23 Aug 2019 04:30 )  PTT:28.0 SEC        Imaging:  reviewed

## 2019-08-23 NOTE — PROGRESS NOTE ADULT - PROBLEM/PLAN-3
Addended by: FREDRICK PARKER on: 9/28/2017 09:48 AM     Modules accepted: Orders    
DISPLAY PLAN FREE TEXT

## 2019-08-23 NOTE — PROGRESS NOTE ADULT - SUBJECTIVE AND OBJECTIVE BOX
Patient is a 68y old  Female who presents with a chief complaint of Pancreatic mass, Obstructive cholestatic liver disease, Urinary tract infection without Hematuria, Hypokalemia, PVD, Essential Hypertension. (23 Aug 2019 06:24)      SUBJECTIVE / OVERNIGHT EVENTS:    MEDICATIONS  (STANDING):  amLODIPine   Tablet 10 milliGRAM(s) Oral daily  ergocalciferol 80019 Unit(s) Oral <User Schedule>  multivitamin 1 Tablet(s) Oral daily  piperacillin/tazobactam IVPB.. 3.375 Gram(s) IV Intermittent every 8 hours  polyethylene glycol 3350 17 Gram(s) Oral daily  sodium chloride 0.9%. 1000 milliLiter(s) (75 mL/Hr) IV Continuous <Continuous>    MEDICATIONS  (PRN):  ALBUTerol    90 MICROgram(s) HFA Inhaler 2 Puff(s) Inhalation every 6 hours PRN Shortness of Breath and/or Wheezing  morphine  - Injectable 2 milliGRAM(s) IV Push every 4 hours PRN Severe Pain (7 - 10)  ondansetron Injectable 4 milliGRAM(s) IV Push every 6 hours PRN Nausea and/or Vomiting  oxyCODONE    IR 5 milliGRAM(s) Oral every 6 hours PRN Moderate Pain (4 - 6) and severe pain          22 Aug 2019 07:01  -  23 Aug 2019 07:00  --------------------------------------------------------  IN: 0 mL / OUT: 120 mL / NET: -120 mL    Vital Signs Last 24 Hrs  T(C): 36.7 (23 Aug 2019 05:57), Max: 36.7 (22 Aug 2019 13:47)  T(F): 98 (23 Aug 2019 05:57), Max: 98 (22 Aug 2019 13:47)  HR: 87 (23 Aug 2019 05:57) (59 - 87)  BP: 143/82 (23 Aug 2019 05:57) (100/61 - 143/82)  BP(mean): --  RR: 17 (23 Aug 2019 05:57) (17 - 20)  SpO2: 98% (23 Aug 2019 05:57) (97% - 99%)    PHYSICAL EXAM:  GENERAL: NAD, well-developed  HEAD:  Atraumatic, Normocephalic  EYES: EOMI, PERRLA, conjunctiva and sclera clear  NECK: Supple, No JVD  CHEST/LUNG: Clear to auscultation bilaterally; No wheeze  HEART: Regular rate and rhythm; No murmurs, rubs, or gallops  ABDOMEN: Soft, Nontender, Nondistended; Bowel sounds present  EXTREMITIES:  2+ Peripheral Pulses, No clubbing, cyanosis, or edema  PSYCH: AAOx3  NEUROLOGY: non-focal  SKIN: No rashes or lesions    LABS:                        9.3    7.97  )-----------( 331      ( 23 Aug 2019 04:30 )             28.0     08-23    135  |  99  |  7   ----------------------------<  120<H>  3.7   |  23  |  0.58    Ca    8.8      23 Aug 2019 04:30  Phos  2.8     08-23  Mg     1.6     08-23    TPro  6.2  /  Alb  3.0<L>  /  TBili  3.7<H>  /  DBili  x   /  AST  28  /  ALT  40<H>  /  AlkPhos  367<H>  08-23    PT/INR - ( 23 Aug 2019 04:30 )   PT: 11.7 SEC;   INR: 1.05          PTT - ( 23 Aug 2019 04:30 )  PTT:28.0 SEC          RADIOLOGY & ADDITIONAL TESTS:    Imaging Personally Reviewed:    Consultant(s) Notes Reviewed:      Care Discussed with Consultants/Other Providers: Patient is a 68y old  Female who presents with a chief complaint of Pancreatic mass, Obstructive cholestatic liver disease, Urinary tract infection without Hematuria, Hypokalemia, PVD, Essential Hypertension. (23 Aug 2019 06:24)      SUBJECTIVE / OVERNIGHT EVENTS:    MEDICATIONS  (STANDING):  amLODIPine   Tablet 10 milliGRAM(s) Oral daily  ergocalciferol 04081 Unit(s) Oral <User Schedule>  multivitamin 1 Tablet(s) Oral daily  piperacillin/tazobactam IVPB.. 3.375 Gram(s) IV Intermittent every 8 hours  polyethylene glycol 3350 17 Gram(s) Oral daily  sodium chloride 0.9%. 1000 milliLiter(s) (75 mL/Hr) IV Continuous <Continuous>    MEDICATIONS  (PRN):  ALBUTerol    90 MICROgram(s) HFA Inhaler 2 Puff(s) Inhalation every 6 hours PRN Shortness of Breath and/or Wheezing  morphine  - Injectable 2 milliGRAM(s) IV Push every 4 hours PRN Severe Pain (7 - 10)  ondansetron Injectable 4 milliGRAM(s) IV Push every 6 hours PRN Nausea and/or Vomiting  oxyCODONE    IR 5 milliGRAM(s) Oral every 6 hours PRN Moderate Pain (4 - 6) and severe pain          22 Aug 2019 07:01  -  23 Aug 2019 07:00  --------------------------------------------------------  IN: 0 mL / OUT: 120 mL / NET: -120 mL    Vital Signs Last 24 Hrs  T(C): 36.7 (23 Aug 2019 05:57), Max: 36.7 (22 Aug 2019 13:47)  T(F): 98 (23 Aug 2019 05:57), Max: 98 (22 Aug 2019 13:47)  HR: 87 (23 Aug 2019 05:57) (59 - 87)  BP: 143/82 (23 Aug 2019 05:57) (100/61 - 143/82)  BP(mean): --  RR: 17 (23 Aug 2019 05:57) (17 - 20)  SpO2: 98% (23 Aug 2019 05:57) (97% - 99%)    PHYSICAL EXAM:  GENERAL: NAD, well-developed  HEAD:  Atraumatic, Normocephalic  EYES: EOMI, PERRLA, conjunctiva and sclera clear  NECK: Supple, No JVD  CHEST/LUNG: Clear to auscultation bilaterally; No wheeze  HEART: Regular rate and rhythm; No murmurs, rubs, or gallops  ABDOMEN: Soft, Nontender, Nondistended; Bowel sounds present  biliary derain still in place  EXTREMITIES:  2+ Peripheral Pulses, No clubbing, cyanosis, or edema  PSYCH: AAOx3  NEUROLOGY: non-focal  SKIN: No rashes or lesions    LABS:                        9.3    7.97  )-----------( 331      ( 23 Aug 2019 04:30 )             28.0     08-23    135  |  99  |  7   ----------------------------<  120<H>  3.7   |  23  |  0.58    Ca    8.8      23 Aug 2019 04:30  Phos  2.8     08-23  Mg     1.6     08-23    TPro  6.2  /  Alb  3.0<L>  /  TBili  3.7<H>  /  DBili  x   /  AST  28  /  ALT  40<H>  /  AlkPhos  367<H>  08-23    PT/INR - ( 23 Aug 2019 04:30 )   PT: 11.7 SEC;   INR: 1.05          PTT - ( 23 Aug 2019 04:30 )  PTT:28.0 SEC          RADIOLOGY & ADDITIONAL TESTS:  < from: IR Procedure (08.19.19 @ 20:21) >  Internal/external biliary drain placement.  Common bile duct bare-metal stent placement.    Plan:  -Gravity drainage of the external biliary drain for 1-2 days.   -Patient to return to interventional radiology department for a   cholangiogram and possible stent revision.  Stent rev done 8/22- out patient f/u 8/28/19 with IR      Care Discussed with Consultants/Other Providers:

## 2019-08-23 NOTE — PROGRESS NOTE ADULT - ASSESSMENT
68 year old female, with past history significant for HTN, Anemia, Smoking, PVD, Claudication of R-LE, and RLE stent placement, who presents for evaluation of pancreatic mass from [facility].    # Bile leak s/p CBD stent, internal-external catheter placement by IR  #pancreatic mass with obstructive jaundice - path consistent with pdac  # PVD with RLE stent  #UTI on abx    Recs:  - Continue IV antibiotics while inpatient; will need 10 day course total, can convert to PO as outpatient  - appreciate IR assistance with drain placement and maintenance  - repeat CBC, CMP, INR  - diet as tolerated  - oncology followup

## 2019-08-23 NOTE — PROGRESS NOTE ADULT - PROBLEM SELECTOR PROBLEM 2
Obstructive cholestatic liver disease

## 2019-08-23 NOTE — DISCHARGE NOTE NURSING/CASE MANAGEMENT/SOCIAL WORK - NSDCFUADDAPPT_GEN_ALL_CORE_FT
Dr. Tong Epps: [facility], Monday 8/26/19, please call to confirm appointment time    Interventional Radiology, [facility]: Wednesday 8/28/19 at 2:30pm

## 2019-08-23 NOTE — PROGRESS NOTE ADULT - PROBLEM SELECTOR PROBLEM 7
Preventive measure

## 2019-08-23 NOTE — DISCHARGE NOTE NURSING/CASE MANAGEMENT/SOCIAL WORK - NSDCDPATPORTLINK_GEN_ALL_CORE
You can access the [facility] Patient Portal, offered by [facility], by registering with the following website: [email or web address]

## 2019-08-23 NOTE — PROGRESS NOTE ADULT - PROVIDER SPECIALTY LIST ADULT
Cardiology
Gastroenterology
Heme/Onc
Heme/Onc
Hospitalist
Intervent Radiology
Surgery
Cardiology
Gastroenterology
Hospitalist
Hospitalist

## 2019-08-23 NOTE — PROGRESS NOTE ADULT - PROBLEM SELECTOR PLAN 4
s/p 3 days of ceftriaxone, likely assymptomatic bactiuria.
- Rocephin empirically  - no currrent urine culture and ABX already started
s/p 3 days of ceftriaxone, likely assymptomatic bactiuria.
unclear if has UTI vs. colonizawtion but was started on therpay, will finish empiric treatment for 3 days.
s/p 3 days of ceftriaxone, likely assymptomatic bactiuria.

## 2019-08-23 NOTE — PROGRESS NOTE ADULT - PROBLEM SELECTOR PROBLEM 1
Pancreatic mass

## 2019-08-23 NOTE — PROGRESS NOTE ADULT - PROBLEM SELECTOR PLAN 2
likely in setting of pancreatic mass,   Eus 8/12- with pancreatic adenocarcinoma   - stent on 8/19, given elevated bilirubin of 8.3- s/p shaquille drain tube -now 3.7- int stent  8/22  advance diet

## 2019-08-23 NOTE — PROGRESS NOTE ADULT - PROBLEM SELECTOR PROBLEM 4
Urinary tract infection without hematuria, site unspecified

## 2019-08-23 NOTE — PROGRESS NOTE ADULT - PROBLEM SELECTOR PLAN 5
resolved continue to follow mag and phosph and potassium
resolved
resolved
resolved continue to follow mag and phosph and potassium
resolved
resolved continue to follow mag and phosph and potassium
resolved continue to follow mag and phosph and potassium

## 2019-08-23 NOTE — PROGRESS NOTE ADULT - PROBLEM SELECTOR PROBLEM 6
Peripheral vascular disease

## 2019-08-23 NOTE — PROGRESS NOTE ADULT - ASSESSMENT
68f with 2 weeks of diarrhea and now with pain to the ruq with multiple stones rto the ruq, found to have a Pancreatic  head mass at OSH and obstructive jaundice, transferred for EUS. Pt, underwent EUS with biopsy on 8/12, which showed pancreatic adenocarinoma, she was also found to have a  CT chest concerning for metastatic disease, s/p IR biopsy on 8/15.  UTI- completed iv ceftriaxone x3 days    Pt. with uptrending Tbili, now at 8.8 on adm-  s/p GI procedure on 8/19 for stent- attempted with no success had  ext drain placed by ir  L arm iv pain- reloved  Lung Bxp c/w Pancreatic CA on cytology  Pancreatic Cancer to head of pancreas with mets to lung- with improving Bili with placement of biliary drain  Hyperbili -impr- 3.7  total bili- improving- gi  internalized of drain 8/22 and ext drain---- 68f with 2 weeks of diarrhea and now with pain to the ruq with multiple stones rto the ruq, found to have a Pancreatic  head mass at OSH and obstructive jaundice, transferred for EUS. Pt, underwent EUS with biopsy on 8/12, which showed pancreatic adenocarinoma, she was also found to have a  CT chest concerning for metastatic disease, s/p IR biopsy on 8/15.  UTI- completed iv ceftriaxone x3 days    Pt. with uptrending Tbili, now at 8.8 on adm-  s/p GI procedure on 8/19 for stent- attempted with no success had  ext drain placed by ir  L arm iv pain- reloved  Lung Bxp c/w Pancreatic CA on cytology  Pancreatic Cancer to head of pancreas with mets to lung- with improving Bili with placement of biliary drain  Hyperbili -impr- 3.7  total bili- improving- gi  internalized of drain 8/22 and ext drain capped  zosyn changed to po cipro for home.

## 2019-08-23 NOTE — PROGRESS NOTE ADULT - PROBLEM SELECTOR PLAN 8
Hyponatremia, asymptomatic  spurious vs. SIADH    - CTM   -
Resolved
Hyponatremia, asymptomatic  spurious vs. SIADH    - CTM   -

## 2019-08-23 NOTE — PROGRESS NOTE ADULT - ATTENDING COMMENTS
Pancreatic Cancer to head of pancreas with mets to lung- with improving Bili with placement of biliary drain  Hyperbili -impr- 3.7  total bili- improving- gi  internalized of drain 8/22 and ext drain----  not a surgical candidate  out patient f/u with Oncology at [facility]    40 min to coordinate care Pancreatic Cancer to head of pancreas with mets to lung- with improving Bili with placement of biliary drain  Hyperbili -impr- 3.7  total bili- improving- gi  internalized of drain 8/22 and ext drain----  not a surgical candidate  Internal/external biliary drain placement.  Common bile duct bare-metal stent placement.  -Patient to return to interventional radiology department for a   cholangiogram and possible stent revision.  Stent rev done 8/22- out patient f/u 8/28/19 with IR    out patient f/u with Oncology at [facility] and IR and PCP  HCP is Avel Dick- HCP form completed    60 min to coordinate care Pancreatic Cancer to head of pancreas with mets to lung- with improving Bili with placement of biliary drain  Hyperbili -impr- 3.7  total bili- improving- gi  internalized of drain 8/22 and ext drain----  not a surgical candidate  Internal/external biliary drain placement.  Common bile duct bare-metal stent placement.  -Patient to return to interventional radiology department for a   cholangiogram and possible stent revision.  Stent rev done 8/22- out patient f/u 8/28/19 with IR    out patient f/u with Oncology at [facility]- Dr Acevedo on Monday 8/26 center and IR on Wensday 8/28 and PCP  HCP is Daughter Tessa wright Harley- HCP form completed    60 min to coordinate care

## 2019-08-23 NOTE — PROGRESS NOTE ADULT - SUBJECTIVE AND OBJECTIVE BOX
Interventional Radiology Follow- Up Note      68y Female s/p biliary stent placement and external biliary drainage. Seen at bedside at 0830AM  No complaints offered.    Vitals: T(F): 97.9 (08-23-19 @ 13:57), Max: 98.9 (08-23-19 @ 10:58)  HR: 98 (08-23-19 @ 13:57) (64 - 98)  BP: 126/80 (08-23-19 @ 13:57) (100/61 - 143/82)  RR: 18 (08-23-19 @ 13:57) (17 - 18)  SpO2: 100% (08-23-19 @ 13:57) (97% - 100%)  Wt(kg): --    LABS:                        9.3    7.97  )-----------( 331      ( 23 Aug 2019 04:30 )             28.0     08-23    135  |  99  |  7   ----------------------------<  120<H>  3.7   |  23  |  0.58    Ca    8.8      23 Aug 2019 04:30  Phos  2.8     08-23  Mg     1.6     08-23    TPro  6.2  /  Alb  3.0<L>  /  TBili  3.7<H>  /  DBili  x   /  AST  28  /  ALT  40<H>  /  AlkPhos  367<H>  08-23    PT/INR - ( 23 Aug 2019 04:30 )   PT: 11.7 SEC;   INR: 1.05          PTT - ( 23 Aug 2019 04:30 )  PTT:28.0 SEC  I&O's Detail    22 Aug 2019 07:01  -  23 Aug 2019 07:00  --------------------------------------------------------  IN:  Total IN: 0 mL    OUT:    Drain: 120 mL  Total OUT: 120 mL    Total NET: -120 mL            PHYSICAL EXAM:    General: Nontoxic, in NAD  Neuro:  Alert & oriented x 3  Abdomen: soft, NTND. LUQ drain site C,D,I    Impression: 68y Female s/p biliary stent and external drainage catheter placement.     Plan:  -Currently drain is capped.   -Patient to follow up as outpatient on 08/28/19  -Ok to D/C home from IR perspective    Zhao Jimenez MD  PGY-6, Interventional Radiology  Pager: [phone]

## 2019-08-23 NOTE — PROGRESS NOTE ADULT - PROBLEM SELECTOR PROBLEM 5
Hypokalemia

## 2019-08-23 NOTE — PROGRESS NOTE ADULT - SUBJECTIVE AND OBJECTIVE BOX
SUBJECTIVE: No chest pain or sob       ALBUTerol    90 MICROgram(s) HFA Inhaler 2 Puff(s) Inhalation every 6 hours PRN  amLODIPine   Tablet 10 milliGRAM(s) Oral daily  ergocalciferol 15521 Unit(s) Oral <User Schedule>  morphine  - Injectable 2 milliGRAM(s) IV Push every 4 hours PRN  multivitamin 1 Tablet(s) Oral daily  ondansetron Injectable 4 milliGRAM(s) IV Push every 6 hours PRN  oxyCODONE    IR 5 milliGRAM(s) Oral every 6 hours PRN  piperacillin/tazobactam IVPB.. 3.375 Gram(s) IV Intermittent every 8 hours  polyethylene glycol 3350 17 Gram(s) Oral daily  sodium chloride 0.9%. 1000 milliLiter(s) IV Continuous <Continuous>                            9.3    7.97  )-----------( 331      ( 23 Aug 2019 04:30 )             28.0       08-23    135  |  99  |  7   ----------------------------<  120<H>  3.7   |  23  |  0.58    Ca    8.8      23 Aug 2019 04:30  Phos  2.8     08-23  Mg     1.6     08-23    TPro  6.2  /  Alb  3.0<L>  /  TBili  3.7<H>  /  DBili  x   /  AST  28  /  ALT  40<H>  /  AlkPhos  367<H>  08-23            T(C): 37.2 (08-23-19 @ 10:58), Max: 37.2 (08-23-19 @ 10:58)  HR: 76 (08-23-19 @ 10:58) (59 - 87)  BP: 128/75 (08-23-19 @ 10:58) (100/61 - 143/82)  RR: 18 (08-23-19 @ 10:58) (17 - 20)  SpO2: 100% (08-23-19 @ 10:58) (97% - 100%)  Wt(kg): --    I&O's Summary    22 Aug 2019 07:01  -  23 Aug 2019 07:00  --------------------------------------------------------  IN: 0 mL / OUT: 120 mL / NET: -120 mL        Cardiovascular:  S1S2 RRR, No JVD  Respiratory: diminished at bases, normal effort  Gastrointestinal: Abdomen soft, ND, NT, +BS  Skin: Warm, dry, intact. No rash.  Musculoskeletal: Normal ROM, normal strength  Ext: No C/C/E B/L LE    DIAGNOSTIC DATA  EKG: NSR    < from: Transthoracic Echocardiogram (08.16.19 @ 15:34) >  CONCLUSIONS:  1. Calcified trileaflet aortic valve with normal opening.  2. Normal left ventricular internal dimensions and wall  thicknesses.  3. Overall preserved left ventricular systolic function.  Basal inferior and basal inferoseptal wall hypokinesis.  4. Normal right ventricular size and function.  ------------------------------------------------------------------------  Confirmed on  8/16/2019 - 17:06:36 by Jason Dee M.D. RPVI    < end of copied text >      Echo: Done as outpatient on 6/29/18 (full result placed in physical chart)  IMPRESSION: Normal LV size and systolic function. Estimated LVEF 60%. LV wall motion abnormality - hypokinesis of basal and inferoseptal segments as well as entire inferior wall. Remaining segments with normal wall motion. Grade 1 diastolic dysfunction. Normal RV size and function.    NST: Done in office on 7/9/18 (full result placed in physical chart)  CONCLUSIONS: Normal Study .   Normal myocardial perfusion SPECT images.   Normal left ventricular size and function. Calculated EF is 63%.     ASSESSMENT AND PLAN:  Patient is a 68 year old female known to our office (Cardiologist - Dr. Cash) with PMHx of HTN, former smoker, anemia, COPD (with inhaler use per patient), PVD s/p RLE bypass in 2018 who had an echo with normal LV/RV function and a normal nuclear stress test in our office last year as preop for LE bypass now transferred from [facility] for work up of pancreatic mass (path + adenoca) and lung biopsy.  s/p IR CBD stent and drain placement last PM    - No evidence of clinical heart failure or anginal symptoms  - Repeat echo with normal LV function   -Heme and surgery f/u noted, path c/w met panc ca to lungs  -no surgery being planned at this time  -OP chemo to be arranged  -no further cardiac work up planned    Maximiliano Sarmiento MD

## 2019-08-25 LAB — CULTURE - SURGICAL SITE: SIGNIFICANT CHANGE UP

## 2019-08-26 ENCOUNTER — RESULT REVIEW (OUTPATIENT)
Age: 69
End: 2019-08-26

## 2019-08-26 ENCOUNTER — LABORATORY RESULT (OUTPATIENT)
Age: 69
End: 2019-08-26

## 2019-08-26 ENCOUNTER — APPOINTMENT (OUTPATIENT)
Dept: HEMATOLOGY ONCOLOGY | Facility: CLINIC | Age: 69
End: 2019-08-26
Payer: COMMERCIAL

## 2019-08-26 VITALS
TEMPERATURE: 98.4 F | BODY MASS INDEX: 21.39 KG/M2 | OXYGEN SATURATION: 99 % | SYSTOLIC BLOOD PRESSURE: 120 MMHG | HEART RATE: 103 BPM | HEIGHT: 68.9 IN | WEIGHT: 144.4 LBS | DIASTOLIC BLOOD PRESSURE: 72 MMHG | RESPIRATION RATE: 14 BRPM

## 2019-08-26 LAB
APTT BLD: 30.5 SEC
BASOPHILS # BLD AUTO: 0 K/UL — SIGNIFICANT CHANGE UP (ref 0–0.2)
BASOPHILS NFR BLD AUTO: 0.4 % — SIGNIFICANT CHANGE UP (ref 0–2)
EOSINOPHIL # BLD AUTO: 0.1 K/UL — SIGNIFICANT CHANGE UP (ref 0–0.5)
EOSINOPHIL NFR BLD AUTO: 1.2 % — SIGNIFICANT CHANGE UP (ref 0–6)
HCT VFR BLD CALC: 35.8 % — SIGNIFICANT CHANGE UP (ref 34.5–45)
HGB BLD-MCNC: 11.2 G/DL — LOW (ref 11.5–15.5)
INR PPP: 1.2 RATIO
LYMPHOCYTES # BLD AUTO: 2.4 K/UL — SIGNIFICANT CHANGE UP (ref 1–3.3)
LYMPHOCYTES # BLD AUTO: 37.4 % — SIGNIFICANT CHANGE UP (ref 13–44)
MCHC RBC-ENTMCNC: 28.6 PG — SIGNIFICANT CHANGE UP (ref 27–34)
MCHC RBC-ENTMCNC: 31.2 G/DL — LOW (ref 32–36)
MCV RBC AUTO: 91.6 FL — SIGNIFICANT CHANGE UP (ref 80–100)
MONOCYTES # BLD AUTO: 0.4 K/UL — SIGNIFICANT CHANGE UP (ref 0–0.9)
MONOCYTES NFR BLD AUTO: 6.4 % — SIGNIFICANT CHANGE UP (ref 2–14)
NEUTROPHILS # BLD AUTO: 3.5 K/UL — SIGNIFICANT CHANGE UP (ref 1.8–7.4)
NEUTROPHILS NFR BLD AUTO: 54.6 % — SIGNIFICANT CHANGE UP (ref 43–77)
PLATELET # BLD AUTO: 398 K/UL — SIGNIFICANT CHANGE UP (ref 150–400)
PT BLD: 13.7 SEC
RBC # BLD: 3.91 M/UL — SIGNIFICANT CHANGE UP (ref 3.8–5.2)
RBC # FLD: 15.7 % — HIGH (ref 10.3–14.5)
WBC # BLD: 6.4 K/UL — SIGNIFICANT CHANGE UP (ref 3.8–10.5)
WBC # FLD AUTO: 6.4 K/UL — SIGNIFICANT CHANGE UP (ref 3.8–10.5)

## 2019-08-26 PROCEDURE — 99215 OFFICE O/P EST HI 40 MIN: CPT

## 2019-08-27 LAB
ALBUMIN SERPL ELPH-MCNC: 3.5 G/DL
ALP BLD-CCNC: 344 U/L
ALT SERPL-CCNC: 37 U/L
ANION GAP SERPL CALC-SCNC: 16 MMOL/L
AST SERPL-CCNC: 33 U/L
BILIRUB SERPL-MCNC: 3.1 MG/DL
BUN SERPL-MCNC: 7 MG/DL
CALCIUM SERPL-MCNC: 9.2 MG/DL
CANCER AG19-9 SERPL-ACNC: 72 U/ML
CHLORIDE SERPL-SCNC: 102 MMOL/L
CO2 SERPL-SCNC: 23 MMOL/L
CREAT SERPL-MCNC: 0.53 MG/DL
GLUCOSE SERPL-MCNC: 156 MG/DL
POTASSIUM SERPL-SCNC: 4 MMOL/L
PROT SERPL-MCNC: 6.8 G/DL
SODIUM SERPL-SCNC: 141 MMOL/L

## 2019-09-03 NOTE — CONSULT LETTER
[Dear  ___] : Dear  [unfilled], [Consult Letter:] : I had the pleasure of evaluating your patient, [unfilled]. [Please see my note below.] : Please see my note below. [Consult Closing:] : Thank you very much for allowing me to participate in the care of this patient.  If you have any questions, please do not hesitate to contact me. [Sincerely,] : Sincerely, [FreeTextEntry3] : Tong Epps MD, FACP \par  of Medicine \par Division of Hematology/Oncology\par [facility]\par [facility] \par [address]\[place] [address]\par Tel: [phone]\par Fax: [phone]\par \par \par

## 2019-09-03 NOTE — REASON FOR VISIT
[Initial Consultation] : an initial consultation [Family Member] : family member [FreeTextEntry2] : Pancreatic Adenocarcinoma

## 2019-09-03 NOTE — REVIEW OF SYSTEMS
[Recent Change In Weight] : ~T recent weight change [Difficulty Walking] : difficulty walking [Negative] : Heme/Lymph [Fever] : no fever [Chills] : no chills [Dysphagia] : no dysphagia [Fatigue] : no fatigue [Odynophagia] : no odynophagia [Chest Pain] : no chest pain [Lower Ext Edema] : no lower extremity edema [Palpitations] : no palpitations [SOB on Exertion] : no shortness of breath during exertion [Shortness Of Breath] : no shortness of breath [Abdominal Pain] : no abdominal pain [Constipation] : no constipation [Diarrhea] : no diarrhea [Dysuria] : no dysuria [Joint Pain] : no joint pain [Skin Rash] : no skin rash

## 2019-09-03 NOTE — HISTORY OF PRESENT ILLNESS
[Disease: _____________________] : Disease: [unfilled] [AJCC Stage: ____] : AJCC Stage: [unfilled] [FreeTextEntry1] : Newly diagnosed  [de-identified] : 68 year old female, with past history significant for HTN, Anemia, Smoking (1 pack x 2 weeks; smoked for 35 yrs; quit 2018), R leg PVD, Claudication of R-LE and RLE stent placement (2017; ambulatory with cane since this) who was found to have abnormal LFTs by PMD and send to ER (beginning of August 2019).  Prior to this, she would have 5-6 BM daily (June to July) weight loss of 47 pounds from March 2019 to June 2019, decreased oral intake, early satiety and persistent diarrhea.  She was admitted from 8/8-8/23/19 (transfer from  for EUS). She had EUS done 8/12/19 by Dr Farhan Pearson that showed duodenal mass with few peripancreatic LN enlarged, pancreatic head mass and CBD dilated to 15 mm.  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 IR placed external biliary drain due to elevated bilirubin and on 8/22/19 IR again did Biliary int stents and again assessed her ext drain- with plan to cap Ext drain 8/22/19 PM and if still successfully draining will keep caped or remove for discharge without patient f/u with Oncology at [facility] for treatment.  Patient presents to establish care on 8/26/19.  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  Last T bili 3.7 on 8/23/19.

## 2019-09-03 NOTE — PHYSICAL EXAM
[Restricted in physically strenuous activity but ambulatory and able to carry out work of a light or sedentary nature] : Status 1- Restricted in physically strenuous activity but ambulatory and able to carry out work of a light or sedentary nature, e.g., light house work, office work [Normal] : affect appropriate [de-identified] : biliary drain in epigastric region

## 2019-09-05 ENCOUNTER — OUTPATIENT (OUTPATIENT)
Dept: OUTPATIENT SERVICES | Facility: HOSPITAL | Age: 69
LOS: 1 days | End: 2019-09-05
Payer: MEDICARE

## 2019-09-05 ENCOUNTER — FORM ENCOUNTER (OUTPATIENT)
Age: 69
End: 2019-09-05

## 2019-09-05 DIAGNOSIS — K83.1 OBSTRUCTION OF BILE DUCT: ICD-10-CM

## 2019-09-05 DIAGNOSIS — Z95.828 PRESENCE OF OTHER VASCULAR IMPLANTS AND GRAFTS: Chronic | ICD-10-CM

## 2019-09-05 DIAGNOSIS — Z98.891 HISTORY OF UTERINE SCAR FROM PREVIOUS SURGERY: Chronic | ICD-10-CM

## 2019-09-05 DIAGNOSIS — Z98.890 OTHER SPECIFIED POSTPROCEDURAL STATES: Chronic | ICD-10-CM

## 2019-09-05 PROCEDURE — 47537 REMOVAL BILIARY DRG CATH: CPT

## 2019-09-06 ENCOUNTER — OUTPATIENT (OUTPATIENT)
Dept: OUTPATIENT SERVICES | Facility: HOSPITAL | Age: 69
LOS: 1 days | End: 2019-09-06
Payer: COMMERCIAL

## 2019-09-06 DIAGNOSIS — C25.9 MALIGNANT NEOPLASM OF PANCREAS, UNSPECIFIED: ICD-10-CM

## 2019-09-06 DIAGNOSIS — Z98.891 HISTORY OF UTERINE SCAR FROM PREVIOUS SURGERY: Chronic | ICD-10-CM

## 2019-09-06 DIAGNOSIS — Z98.890 OTHER SPECIFIED POSTPROCEDURAL STATES: Chronic | ICD-10-CM

## 2019-09-06 DIAGNOSIS — Z95.828 PRESENCE OF OTHER VASCULAR IMPLANTS AND GRAFTS: Chronic | ICD-10-CM

## 2019-09-06 PROCEDURE — 36561 INSERT TUNNELED CV CATH: CPT

## 2019-09-06 PROCEDURE — C1769: CPT

## 2019-09-06 PROCEDURE — C1894: CPT

## 2019-09-06 PROCEDURE — 76937 US GUIDE VASCULAR ACCESS: CPT | Mod: 26

## 2019-09-06 PROCEDURE — C1788: CPT

## 2019-09-06 PROCEDURE — 76937 US GUIDE VASCULAR ACCESS: CPT

## 2019-09-06 PROCEDURE — 36561 INSERT TUNNELED CV CATH: CPT | Mod: RT

## 2019-09-06 PROCEDURE — 77001 FLUOROGUIDE FOR VEIN DEVICE: CPT | Mod: 26

## 2019-09-06 PROCEDURE — 77001 FLUOROGUIDE FOR VEIN DEVICE: CPT

## 2019-09-11 ENCOUNTER — LABORATORY RESULT (OUTPATIENT)
Age: 69
End: 2019-09-11

## 2019-09-11 ENCOUNTER — APPOINTMENT (OUTPATIENT)
Dept: INFUSION THERAPY | Facility: HOSPITAL | Age: 69
End: 2019-09-11

## 2019-09-11 ENCOUNTER — RESULT REVIEW (OUTPATIENT)
Age: 69
End: 2019-09-11

## 2019-09-11 ENCOUNTER — APPOINTMENT (OUTPATIENT)
Dept: HEMATOLOGY ONCOLOGY | Facility: CLINIC | Age: 69
End: 2019-09-11
Payer: COMMERCIAL

## 2019-09-11 VITALS
DIASTOLIC BLOOD PRESSURE: 70 MMHG | RESPIRATION RATE: 14 BRPM | OXYGEN SATURATION: 99 % | TEMPERATURE: 98.2 F | SYSTOLIC BLOOD PRESSURE: 122 MMHG | HEART RATE: 88 BPM

## 2019-09-11 LAB
BASOPHILS # BLD AUTO: 0.1 K/UL — SIGNIFICANT CHANGE UP (ref 0–0.2)
BASOPHILS NFR BLD AUTO: 1.1 % — SIGNIFICANT CHANGE UP (ref 0–2)
EOSINOPHIL # BLD AUTO: 0.2 K/UL — SIGNIFICANT CHANGE UP (ref 0–0.5)
EOSINOPHIL NFR BLD AUTO: 2.9 % — SIGNIFICANT CHANGE UP (ref 0–6)
HCT VFR BLD CALC: 38.2 % — SIGNIFICANT CHANGE UP (ref 34.5–45)
HGB BLD-MCNC: 12.3 G/DL — SIGNIFICANT CHANGE UP (ref 11.5–15.5)
LYMPHOCYTES # BLD AUTO: 2.4 K/UL — SIGNIFICANT CHANGE UP (ref 1–3.3)
LYMPHOCYTES # BLD AUTO: 35.8 % — SIGNIFICANT CHANGE UP (ref 13–44)
MCHC RBC-ENTMCNC: 28.1 PG — SIGNIFICANT CHANGE UP (ref 27–34)
MCHC RBC-ENTMCNC: 32.2 G/DL — SIGNIFICANT CHANGE UP (ref 32–36)
MCV RBC AUTO: 87.2 FL — SIGNIFICANT CHANGE UP (ref 80–100)
MONOCYTES # BLD AUTO: 0.6 K/UL — SIGNIFICANT CHANGE UP (ref 0–0.9)
MONOCYTES NFR BLD AUTO: 9.2 % — SIGNIFICANT CHANGE UP (ref 2–14)
NEUTROPHILS # BLD AUTO: 3.4 K/UL — SIGNIFICANT CHANGE UP (ref 1.8–7.4)
NEUTROPHILS NFR BLD AUTO: 51 % — SIGNIFICANT CHANGE UP (ref 43–77)
PLATELET # BLD AUTO: 266 K/UL — SIGNIFICANT CHANGE UP (ref 150–400)
RBC # BLD: 4.39 M/UL — SIGNIFICANT CHANGE UP (ref 3.8–5.2)
RBC # FLD: 14.1 % — SIGNIFICANT CHANGE UP (ref 10.3–14.5)
WBC # BLD: 6.6 K/UL — SIGNIFICANT CHANGE UP (ref 3.8–10.5)
WBC # FLD AUTO: 6.6 K/UL — SIGNIFICANT CHANGE UP (ref 3.8–10.5)

## 2019-09-11 PROCEDURE — 99214 OFFICE O/P EST MOD 30 MIN: CPT

## 2019-09-11 RX ORDER — PROCHLORPERAZINE MALEATE 10 MG/1
10 TABLET ORAL EVERY 6 HOURS
Qty: 20 | Refills: 6 | Status: ACTIVE | COMMUNITY
Start: 2019-09-11 | End: 1900-01-01

## 2019-09-11 RX ORDER — ONDANSETRON 4 MG/1
4 TABLET ORAL EVERY 8 HOURS
Qty: 60 | Refills: 3 | Status: DISCONTINUED | COMMUNITY
Start: 2019-09-11 | End: 2019-09-11

## 2019-09-12 ENCOUNTER — INBOUND DOCUMENT (OUTPATIENT)
Age: 69
End: 2019-09-12

## 2019-09-12 DIAGNOSIS — Z51.11 ENCOUNTER FOR ANTINEOPLASTIC CHEMOTHERAPY: ICD-10-CM

## 2019-09-12 DIAGNOSIS — E86.0 DEHYDRATION: ICD-10-CM

## 2019-09-12 DIAGNOSIS — Z45.2 ENCOUNTER FOR ADJUSTMENT AND MANAGEMENT OF VASCULAR ACCESS DEVICE: ICD-10-CM

## 2019-09-12 DIAGNOSIS — R11.2 NAUSEA WITH VOMITING, UNSPECIFIED: ICD-10-CM

## 2019-09-12 DIAGNOSIS — C25.9 MALIGNANT NEOPLASM OF PANCREAS, UNSPECIFIED: ICD-10-CM

## 2019-09-12 DIAGNOSIS — Z43.4 ENCOUNTER FOR ATTENTION TO OTHER ARTIFICIAL OPENINGS OF DIGESTIVE TRACT: ICD-10-CM

## 2019-09-13 NOTE — REVIEW OF SYSTEMS
[Recent Change In Weight] : ~T recent weight change [Difficulty Walking] : difficulty walking [Negative] : Allergic/Immunologic [Abdominal Pain] : abdominal pain [Fever] : no fever [Chills] : no chills [Fatigue] : no fatigue [Dysphagia] : no dysphagia [Odynophagia] : no odynophagia [Chest Pain] : no chest pain [Palpitations] : no palpitations [Lower Ext Edema] : no lower extremity edema [Shortness Of Breath] : no shortness of breath [SOB on Exertion] : no shortness of breath during exertion [Constipation] : no constipation [Diarrhea] : no diarrhea [Dysuria] : no dysuria [Joint Pain] : no joint pain [Skin Rash] : no skin rash [FreeTextEntry7] : nausea

## 2019-09-13 NOTE — PHYSICAL EXAM
[Restricted in physically strenuous activity but ambulatory and able to carry out work of a light or sedentary nature] : Status 1- Restricted in physically strenuous activity but ambulatory and able to carry out work of a light or sedentary nature, e.g., light house work, office work [Normal] : grossly intact [de-identified] : R  [place]  [de-identified] : biliary drain removed

## 2019-09-13 NOTE — REASON FOR VISIT
[Follow-Up Visit] : a follow-up [Family Member] : family member [FreeTextEntry2] : Pancreatic Adenocarcinoma

## 2019-09-13 NOTE — HISTORY OF PRESENT ILLNESS
[Disease: _____________________] : Disease: [unfilled] [AJCC Stage: ____] : AJCC Stage: [unfilled] [Date: ____________] : Patient's last distress assessment performed on [unfilled]. [0 - No Distress] : Distress Level: 0 [de-identified] : 68 year old female, with past history significant for HTN, Anemia, Smoking (1 pack x 2 weeks; smoked for 35 yrs; quit 2018), R leg PVD, Claudication of R-LE and RLE stent placement (2017; ambulatory with cane since this) who was found to have abnormal LFTs by PMD and send to ER (beginning of August 2019).  Prior to this, she would have 5-6 BM daily (June to July) weight loss of 47 pounds from March 2019 to June 2019, decreased oral intake, early satiety and persistent diarrhea.  She was admitted from 8/8-8/23/19 (transfer from  for EUS). She had EUS done 8/12/19 by Dr Farhan Pearson that showed duodenal mass with few peripancreatic LN enlarged, pancreatic head mass and CBD dilated to 15 mm.  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 IR placed external biliary drain due to elevated bilirubin and on 8/22/19 IR again did Biliary int stents and again assessed her ext drain- with plan to cap Ext drain 8/22/19 PM and if still successfully draining will keep caped or remove for discharge without patient f/u with Oncology at [facility] for treatment.  Patient presents to establish care on 8/26/19.  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  She states she ran out of Respiratory Motionan Sunday and has been feeling epigastric pain, nausea and feeling of fullness.  She had her port placed as well.

## 2019-09-17 LAB — FUNGUS SPEC QL CULT: SIGNIFICANT CHANGE UP

## 2019-09-18 ENCOUNTER — APPOINTMENT (OUTPATIENT)
Dept: INFUSION THERAPY | Facility: HOSPITAL | Age: 69
End: 2019-09-18

## 2019-09-18 ENCOUNTER — RESULT REVIEW (OUTPATIENT)
Age: 69
End: 2019-09-18

## 2019-09-18 ENCOUNTER — LABORATORY RESULT (OUTPATIENT)
Age: 69
End: 2019-09-18

## 2019-09-18 ENCOUNTER — APPOINTMENT (OUTPATIENT)
Dept: HEMATOLOGY ONCOLOGY | Facility: CLINIC | Age: 69
End: 2019-09-18
Payer: COMMERCIAL

## 2019-09-18 VITALS
WEIGHT: 137.79 LBS | RESPIRATION RATE: 16 BRPM | SYSTOLIC BLOOD PRESSURE: 120 MMHG | HEART RATE: 88 BPM | TEMPERATURE: 98.5 F | BODY MASS INDEX: 20.41 KG/M2 | DIASTOLIC BLOOD PRESSURE: 70 MMHG | OXYGEN SATURATION: 99 %

## 2019-09-18 LAB
BASOPHILS # BLD AUTO: 0.1 K/UL — SIGNIFICANT CHANGE UP (ref 0–0.2)
BASOPHILS NFR BLD AUTO: 2.1 % — HIGH (ref 0–2)
EOSINOPHIL # BLD AUTO: 0.1 K/UL — SIGNIFICANT CHANGE UP (ref 0–0.5)
EOSINOPHIL NFR BLD AUTO: 1.4 % — SIGNIFICANT CHANGE UP (ref 0–6)
HCT VFR BLD CALC: 36.5 % — SIGNIFICANT CHANGE UP (ref 34.5–45)
HGB BLD-MCNC: 12.2 G/DL — SIGNIFICANT CHANGE UP (ref 11.5–15.5)
LYMPHOCYTES # BLD AUTO: 2 K/UL — SIGNIFICANT CHANGE UP (ref 1–3.3)
LYMPHOCYTES # BLD AUTO: 45.1 % — HIGH (ref 13–44)
MCHC RBC-ENTMCNC: 29.2 PG — SIGNIFICANT CHANGE UP (ref 27–34)
MCHC RBC-ENTMCNC: 33.5 G/DL — SIGNIFICANT CHANGE UP (ref 32–36)
MCV RBC AUTO: 87 FL — SIGNIFICANT CHANGE UP (ref 80–100)
MONOCYTES # BLD AUTO: 0.5 K/UL — SIGNIFICANT CHANGE UP (ref 0–0.9)
MONOCYTES NFR BLD AUTO: 11.3 % — SIGNIFICANT CHANGE UP (ref 2–14)
NEUTROPHILS # BLD AUTO: 1.8 K/UL — SIGNIFICANT CHANGE UP (ref 1.8–7.4)
NEUTROPHILS NFR BLD AUTO: 40.1 % — LOW (ref 43–77)
PLATELET # BLD AUTO: 166 K/UL — SIGNIFICANT CHANGE UP (ref 150–400)
RBC # BLD: 4.2 M/UL — SIGNIFICANT CHANGE UP (ref 3.8–5.2)
RBC # FLD: 13.6 % — SIGNIFICANT CHANGE UP (ref 10.3–14.5)
WBC # BLD: 4.4 K/UL — SIGNIFICANT CHANGE UP (ref 3.8–10.5)
WBC # FLD AUTO: 4.4 K/UL — SIGNIFICANT CHANGE UP (ref 3.8–10.5)

## 2019-09-18 PROCEDURE — 99214 OFFICE O/P EST MOD 30 MIN: CPT

## 2019-09-18 NOTE — HISTORY OF PRESENT ILLNESS
[Disease: _____________________] : Disease: [unfilled] [AJCC Stage: ____] : AJCC Stage: [unfilled] [de-identified] : negative for Gilbert's disease [de-identified] : 68 year old female, with past history significant for HTN, Anemia, Smoking (1 pack x 2 weeks; smoked for 35 yrs; quit 2018), R leg PVD, Claudication of R-LE and RLE stent placement (2017; ambulatory with cane since this) who was found to have abnormal LFTs by PMD and send to ER (beginning of August 2019).  Prior to this, she would have 5-6 BM daily (June to July) weight loss of 47 pounds from March 2019 to June 2019, decreased oral intake, early satiety and persistent diarrhea.  She was admitted from 8/8-8/23/19 (transfer from  for EUS). She had EUS done 8/12/19 by Dr Farhan Pearson that showed duodenal mass with few peripancreatic LN enlarged, pancreatic head mass and CBD dilated to 15 mm.  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 IR placed external biliary drain due to elevated bilirubin and on 8/22/19 IR again did Biliary int stents and again assessed her ext drain- with plan to cap Ext drain 8/22/19 PM and if still successfully draining will keep caped or remove for discharge without patient f/u with Oncology at [facility] for treatment.  Patient presents to establish care on 8/26/19.  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  She had pancultures done (negative to date).  She said she feels well overall and has not had any issues at home.  She also notes that her need for pain medications and nausea medications is lessened compared to last visit.  She endorses a good appetite but still has lost weight.

## 2019-09-18 NOTE — PHYSICAL EXAM
[Restricted in physically strenuous activity but ambulatory and able to carry out work of a light or sedentary nature] : Status 1- Restricted in physically strenuous activity but ambulatory and able to carry out work of a light or sedentary nature, e.g., light house work, office work [Normal] : affect appropriate [de-identified] : R  port

## 2019-09-18 NOTE — REVIEW OF SYSTEMS
[Recent Change In Weight] : ~T recent weight change [Abdominal Pain] : abdominal pain [Difficulty Walking] : difficulty walking [Negative] : Allergic/Immunologic [Chills] : no chills [Fever] : no fever [Dysphagia] : no dysphagia [Fatigue] : no fatigue [Odynophagia] : no odynophagia [Chest Pain] : no chest pain [Palpitations] : no palpitations [Lower Ext Edema] : no lower extremity edema [Shortness Of Breath] : no shortness of breath [SOB on Exertion] : no shortness of breath during exertion [Constipation] : no constipation [Diarrhea] : no diarrhea [Joint Pain] : no joint pain [Dysuria] : no dysuria [Skin Rash] : no skin rash [FreeTextEntry7] : nausea

## 2019-09-23 ENCOUNTER — OUTPATIENT (OUTPATIENT)
Dept: OUTPATIENT SERVICES | Facility: HOSPITAL | Age: 69
LOS: 1 days | Discharge: HOME | End: 2019-09-23

## 2019-09-23 DIAGNOSIS — C25.9 MALIGNANT NEOPLASM OF PANCREAS, UNSPECIFIED: ICD-10-CM

## 2019-09-23 DIAGNOSIS — Z98.891 HISTORY OF UTERINE SCAR FROM PREVIOUS SURGERY: Chronic | ICD-10-CM

## 2019-09-23 DIAGNOSIS — Z98.890 OTHER SPECIFIED POSTPROCEDURAL STATES: Chronic | ICD-10-CM

## 2019-09-23 DIAGNOSIS — Z95.828 PRESENCE OF OTHER VASCULAR IMPLANTS AND GRAFTS: Chronic | ICD-10-CM

## 2019-09-25 ENCOUNTER — LABORATORY RESULT (OUTPATIENT)
Age: 69
End: 2019-09-25

## 2019-09-25 ENCOUNTER — RESULT REVIEW (OUTPATIENT)
Age: 69
End: 2019-09-25

## 2019-09-25 ENCOUNTER — APPOINTMENT (OUTPATIENT)
Dept: INFUSION THERAPY | Facility: HOSPITAL | Age: 69
End: 2019-09-25

## 2019-09-25 LAB
BASOPHILS # BLD AUTO: 0 K/UL — SIGNIFICANT CHANGE UP (ref 0–0.2)
BASOPHILS NFR BLD AUTO: 1 % — SIGNIFICANT CHANGE UP (ref 0–2)
EOSINOPHIL # BLD AUTO: 0 K/UL — SIGNIFICANT CHANGE UP (ref 0–0.5)
HCT VFR BLD CALC: 33 % — LOW (ref 34.5–45)
HGB BLD-MCNC: 10.9 G/DL — LOW (ref 11.5–15.5)
LYMPHOCYTES # BLD AUTO: 3 K/UL — SIGNIFICANT CHANGE UP (ref 1–3.3)
LYMPHOCYTES # BLD AUTO: 63 % — HIGH (ref 13–44)
MCHC RBC-ENTMCNC: 28.6 PG — SIGNIFICANT CHANGE UP (ref 27–34)
MCHC RBC-ENTMCNC: 33.1 G/DL — SIGNIFICANT CHANGE UP (ref 32–36)
MCV RBC AUTO: 86.5 FL — SIGNIFICANT CHANGE UP (ref 80–100)
MONOCYTES # BLD AUTO: 0.2 K/UL — SIGNIFICANT CHANGE UP (ref 0–0.9)
MONOCYTES NFR BLD AUTO: 7 % — SIGNIFICANT CHANGE UP (ref 2–14)
NEUTROPHILS # BLD AUTO: 1.1 K/UL — LOW (ref 1.8–7.4)
NEUTROPHILS NFR BLD AUTO: 29 % — LOW (ref 43–77)
PLAT MORPH BLD: NORMAL — SIGNIFICANT CHANGE UP
PLATELET # BLD AUTO: 94 K/UL — LOW (ref 150–400)
RBC # BLD: 3.81 M/UL — SIGNIFICANT CHANGE UP (ref 3.8–5.2)
RBC # FLD: 14.5 % — SIGNIFICANT CHANGE UP (ref 10.3–14.5)
RBC BLD AUTO: SIGNIFICANT CHANGE UP
WBC # BLD: 4.4 K/UL — SIGNIFICANT CHANGE UP (ref 3.8–10.5)
WBC # FLD AUTO: 4.4 K/UL — SIGNIFICANT CHANGE UP (ref 3.8–10.5)

## 2019-09-26 DIAGNOSIS — Z51.11 ENCOUNTER FOR ANTINEOPLASTIC CHEMOTHERAPY: ICD-10-CM

## 2019-09-26 DIAGNOSIS — R11.2 NAUSEA WITH VOMITING, UNSPECIFIED: ICD-10-CM

## 2019-10-01 ENCOUNTER — INPATIENT (INPATIENT)
Facility: HOSPITAL | Age: 69
LOS: 12 days | Discharge: HOME HEALTH | End: 2019-10-14
Attending: INTERNAL MEDICINE | Admitting: INTERNAL MEDICINE
Payer: MEDICARE

## 2019-10-01 VITALS
TEMPERATURE: 99 F | DIASTOLIC BLOOD PRESSURE: 52 MMHG | HEART RATE: 127 BPM | OXYGEN SATURATION: 100 % | RESPIRATION RATE: 14 BRPM | SYSTOLIC BLOOD PRESSURE: 91 MMHG

## 2019-10-01 DIAGNOSIS — Z98.891 HISTORY OF UTERINE SCAR FROM PREVIOUS SURGERY: Chronic | ICD-10-CM

## 2019-10-01 DIAGNOSIS — Z98.890 OTHER SPECIFIED POSTPROCEDURAL STATES: Chronic | ICD-10-CM

## 2019-10-01 DIAGNOSIS — E87.6 HYPOKALEMIA: ICD-10-CM

## 2019-10-01 DIAGNOSIS — Z95.828 PRESENCE OF OTHER VASCULAR IMPLANTS AND GRAFTS: Chronic | ICD-10-CM

## 2019-10-01 LAB
APTT BLD: 23.1 SEC — LOW (ref 27.5–36.3)
BASE EXCESS BLDV CALC-SCNC: 4.8 MMOL/L — SIGNIFICANT CHANGE UP
BASOPHILS # BLD AUTO: 0 K/UL — SIGNIFICANT CHANGE UP (ref 0–0.2)
BASOPHILS NFR BLD AUTO: 0 % — SIGNIFICANT CHANGE UP (ref 0–2)
BLOOD GAS VENOUS - CREATININE: 0.57 MG/DL — SIGNIFICANT CHANGE UP (ref 0.5–1.3)
BLOOD GAS VENOUS - FIO2: 21 — SIGNIFICANT CHANGE UP
CHLORIDE BLDV-SCNC: 100 MMOL/L — SIGNIFICANT CHANGE UP (ref 96–108)
EOSINOPHIL # BLD AUTO: 0 K/UL — SIGNIFICANT CHANGE UP (ref 0–0.5)
EOSINOPHIL NFR BLD AUTO: 0 % — SIGNIFICANT CHANGE UP (ref 0–6)
GAS PNL BLDV: 135 MMOL/L — LOW (ref 136–146)
GLUCOSE BLDV-MCNC: 121 MG/DL — HIGH (ref 70–99)
HCO3 BLDV-SCNC: 28 MMOL/L — HIGH (ref 20–27)
HCT VFR BLD CALC: 29.7 % — LOW (ref 34.5–45)
HCT VFR BLDV CALC: 31.5 % — LOW (ref 34.5–45)
HGB BLD-MCNC: 9.8 G/DL — LOW (ref 11.5–15.5)
HGB BLDV-MCNC: 10.2 G/DL — LOW (ref 11.5–15.5)
IMM GRANULOCYTES NFR BLD AUTO: 1.1 % — SIGNIFICANT CHANGE UP (ref 0–1.5)
INR BLD: 1.14 — SIGNIFICANT CHANGE UP (ref 0.88–1.17)
LACTATE BLDV-MCNC: 1.6 MMOL/L — SIGNIFICANT CHANGE UP (ref 0.5–2)
LYMPHOCYTES # BLD AUTO: 0.52 K/UL — LOW (ref 1–3.3)
LYMPHOCYTES # BLD AUTO: 29.2 % — SIGNIFICANT CHANGE UP (ref 13–44)
MCHC RBC-ENTMCNC: 26.9 PG — LOW (ref 27–34)
MCHC RBC-ENTMCNC: 33 % — SIGNIFICANT CHANGE UP (ref 32–36)
MCV RBC AUTO: 81.6 FL — SIGNIFICANT CHANGE UP (ref 80–100)
MONOCYTES # BLD AUTO: 0.12 K/UL — SIGNIFICANT CHANGE UP (ref 0–0.9)
MONOCYTES NFR BLD AUTO: 6.7 % — SIGNIFICANT CHANGE UP (ref 2–14)
NEUTROPHILS # BLD AUTO: 1.12 K/UL — LOW (ref 1.8–7.4)
NEUTROPHILS NFR BLD AUTO: 63 % — SIGNIFICANT CHANGE UP (ref 43–77)
NRBC # FLD: 0 K/UL — SIGNIFICANT CHANGE UP (ref 0–0)
PCO2 BLDV: 41 MMHG — SIGNIFICANT CHANGE UP (ref 41–51)
PH BLDV: 7.46 PH — HIGH (ref 7.32–7.43)
PO2 BLDV: 35 MMHG — SIGNIFICANT CHANGE UP (ref 35–40)
POTASSIUM BLDV-SCNC: 3.2 MMOL/L — LOW (ref 3.4–4.5)
PROTHROM AB SERPL-ACNC: 13 SEC — SIGNIFICANT CHANGE UP (ref 9.8–13.1)
RBC # BLD: 3.64 M/UL — LOW (ref 3.8–5.2)
RBC # FLD: 13.8 % — SIGNIFICANT CHANGE UP (ref 10.3–14.5)
SAO2 % BLDV: 63.9 % — SIGNIFICANT CHANGE UP (ref 60–85)
WBC # BLD: 1.78 K/UL — LOW (ref 3.8–10.5)
WBC # FLD AUTO: 1.78 K/UL — LOW (ref 3.8–10.5)

## 2019-10-01 RX ORDER — ONDANSETRON 8 MG/1
4 TABLET, FILM COATED ORAL ONCE
Refills: 0 | Status: COMPLETED | OUTPATIENT
Start: 2019-10-01 | End: 2019-10-01

## 2019-10-01 RX ORDER — ACETAMINOPHEN 500 MG
650 TABLET ORAL ONCE
Refills: 0 | Status: COMPLETED | OUTPATIENT
Start: 2019-10-01 | End: 2019-10-01

## 2019-10-01 RX ORDER — PIPERACILLIN AND TAZOBACTAM 4; .5 G/20ML; G/20ML
3.38 INJECTION, POWDER, LYOPHILIZED, FOR SOLUTION INTRAVENOUS ONCE
Refills: 0 | Status: COMPLETED | OUTPATIENT
Start: 2019-10-01 | End: 2019-10-01

## 2019-10-01 RX ORDER — VANCOMYCIN HCL 1 G
1000 VIAL (EA) INTRAVENOUS ONCE
Refills: 0 | Status: COMPLETED | OUTPATIENT
Start: 2019-10-01 | End: 2019-10-02

## 2019-10-01 RX ORDER — SODIUM CHLORIDE 9 MG/ML
1600 INJECTION INTRAMUSCULAR; INTRAVENOUS; SUBCUTANEOUS ONCE
Refills: 0 | Status: COMPLETED | OUTPATIENT
Start: 2019-10-01 | End: 2019-10-01

## 2019-10-01 RX ADMIN — PIPERACILLIN AND TAZOBACTAM 200 GRAM(S): 4; .5 INJECTION, POWDER, LYOPHILIZED, FOR SOLUTION INTRAVENOUS at 23:41

## 2019-10-01 RX ADMIN — ONDANSETRON 4 MILLIGRAM(S): 8 TABLET, FILM COATED ORAL at 23:41

## 2019-10-01 RX ADMIN — SODIUM CHLORIDE 1600 MILLILITER(S): 9 INJECTION INTRAMUSCULAR; INTRAVENOUS; SUBCUTANEOUS at 23:42

## 2019-10-01 RX ADMIN — Medication 650 MILLIGRAM(S): at 23:42

## 2019-10-01 NOTE — ED ADULT NURSE NOTE - NSIMPLEMENTINTERV_GEN_ALL_ED
Implemented All Fall with Harm Risk Interventions:  [place] to call system. Call bell, personal items and telephone within reach. Instruct patient to call for assistance. Room bathroom lighting operational. Non-slip footwear when patient is off stretcher. Physically safe environment: no spills, clutter or unnecessary equipment. Stretcher in lowest position, wheels locked, appropriate side rails in place. Provide visual cue, wrist band, yellow gown, etc. Monitor gait and stability. Monitor for mental status changes and reorient to person, place, and time. Review medications for side effects contributing to fall risk. Reinforce activity limits and safety measures with patient and family. Provide visual clues: red socks.

## 2019-10-01 NOTE — ED ADULT NURSE NOTE - PAIN RATING/NUMBER SCALE (0-10): REST
Hi, could we call the patient and let her know her labs showed she had an A1c of >14 which means her sugars have consistently been running in the 300s and above.  The rest of her labs suggest her diabetes is type 2 diabetes, the more common type of diabetes in adults.  She missed her appointment with me this past Monday, but because of how high her sugar has been, I would recommend she have a follow up with me (or another endocrinologist) within the next few weeks.    Thanks so much,   Dr. Velasquez  
Spoke with patient on message per  patient also ask about metformin RX to be done before her appointment put patient Rx in sent to     
7

## 2019-10-01 NOTE — ED PROVIDER NOTE - PHYSICAL EXAMINATION
VITALS: reviewed  GEN: NAD  HEAD/EYES: NCAT, EOMI, anicteric sclerae  ENT: mucus membranes dry, oropharynx WNL, trachea midline, no JVD  RESP: lungs CTA with equal breath sounds bilaterally, chest wall nontender and atraumatic  CV: heart with reg rhythm S1, S2, no murmur; distal pulses intact and symmetric bilaterally  ABDOMEN:  soft, diffusely ttp, non distended, no palpable masses  : no CVAT  MSK: extremities atraumatic and nontender, no edema, no asymmetry.   SKIN: warm, dry, no rash, no bruising, no cyanosis. color appropriate for ethnicity  NEURO: alert, mentating appropriately, no facial asymmetry.   PSYCH: Affect appropriate

## 2019-10-01 NOTE — ED PROVIDER NOTE - OBJECTIVE STATEMENT
69 yo F former smoker, HTN, anemia, PVD, claudication RLE s/p stent, stage IV pancreatic cancer on chemotherapy, presenting with one day abdominal pain, nausea and two episodes non-bloody diarrhea. Patient endorses sharp abdominal pain, non radiating. Denies similar pain in past. No fevers, but endorses chills and generalized weakness. No urinary symptoms. No cp/sob.

## 2019-10-01 NOTE — ED PROVIDER NOTE - PROGRESS NOTE DETAILS
Tereso PGY3: patient endorses one day of tingling to bilateral toes. Dopplerable L pedal pulse. feet warm, sensation intact.

## 2019-10-01 NOTE — ED ADULT NURSE NOTE - PMH
Claudication  ~ chiefly of RLE  HTN (hypertension)    Iron deficiency anemia    Smoking history  ~ quit ~ 2017

## 2019-10-01 NOTE — ED PROVIDER NOTE - PSH
History of  section    History of colonoscopy  ~ 2015  History of intravascular stent placement  ~ RLE ([facility] ~ 2018)

## 2019-10-01 NOTE — ED ADULT NURSE NOTE - OBJECTIVE STATEMENT
Facilitator RN- Pt received to spot #22. AAOx4, c/o weakness x1 day acc with abdominal pain and diarrhea. Pt states she has pancreatic cancer and started on chemotherapy this past month. Abdomen tender to palpate, non distended. MD gerard performed. #20g IVSL placed to left arm, labs drawn and sent. Family member at bedside. Report given off to primary RN in area. no acute distress. Awaiting CT scan and further plan of care.

## 2019-10-01 NOTE — ED ADULT TRIAGE NOTE - CHIEF COMPLAINT QUOTE
pt from home brought in for increased weakness x 45 minutes. hx "pelvic cancer" on chemo x 1 month, last session last week. daughter also reports diarrhea x "couple days."

## 2019-10-01 NOTE — ED PROVIDER NOTE - ATTENDING CONTRIBUTION TO CARE
Patient is a 69 yo F with history of pancreatic CA on chemo and HTN with 1 day of abdominal pain and 2 episodes of nonbloody diarrhea. Pain is sharp in her lower abdomen. No vomiting. Patient had subjective fever was found 101.1 here rectally in the ED. No chest pain or shortness of breath. No urinary symptoms.    VS noted  Gen. chronically ill, cachectic  HEENT: EOMI, mmm  Lungs: CTAB/L no C/ W /R   CVS: RRR   Abd; Soft non tender, non distended   Ext: no edema  Skin: no rash  Neuro AAOx3 non focal clear speech  a/p: abdominal pain, febrile - plan for cultures, broad spectrum Abx with vanc/ zosyn, CT A/P. Patient to be admitted.   - Marisela MESA Patient is a 67 yo F with history of pancreatic CA on chemo, last Chemo on Wednesday, and HTN with 1 day of abdominal pain and 2 episodes of nonbloody diarrhea. Patient also passed out in bathroom today. Denies head trauma. Pain is sharp in her lower abdomen. No vomiting. Patient had subjective fever at home and was found 101.1 here rectally in the ED. No chest pain or shortness of breath. No urinary symptoms.    VS noted  Gen. chronically ill, cachectic  HEENT: EOMI, mmm  Lungs: CTAB/L no C/ W /R   CVS: RRR   Abd; Soft diffusely tender with voluntary guarding.   Ext: no edema  Skin: no rash  Neuro AAOx3 non focal clear speech  a/p: abdominal pain, febrile - plan for cultures, broad spectrum Abx with vanc/ zosyn for sepsis, CT A/P. Patient to be admitted.   - Marisela MESA

## 2019-10-02 ENCOUNTER — APPOINTMENT (OUTPATIENT)
Dept: INFUSION THERAPY | Facility: HOSPITAL | Age: 69
End: 2019-10-02

## 2019-10-02 DIAGNOSIS — Z90.710 ACQUIRED ABSENCE OF BOTH CERVIX AND UTERUS: Chronic | ICD-10-CM

## 2019-10-02 DIAGNOSIS — D61.818 OTHER PANCYTOPENIA: ICD-10-CM

## 2019-10-02 DIAGNOSIS — Z95.828 PRESENCE OF OTHER VASCULAR IMPLANTS AND GRAFTS: Chronic | ICD-10-CM

## 2019-10-02 DIAGNOSIS — K56.7 ILEUS, UNSPECIFIED: ICD-10-CM

## 2019-10-02 DIAGNOSIS — C25.9 MALIGNANT NEOPLASM OF PANCREAS, UNSPECIFIED: ICD-10-CM

## 2019-10-02 DIAGNOSIS — A41.9 SEPSIS, UNSPECIFIED ORGANISM: ICD-10-CM

## 2019-10-02 DIAGNOSIS — K52.9 NONINFECTIVE GASTROENTERITIS AND COLITIS, UNSPECIFIED: ICD-10-CM

## 2019-10-02 DIAGNOSIS — E43 UNSPECIFIED SEVERE PROTEIN-CALORIE MALNUTRITION: ICD-10-CM

## 2019-10-02 DIAGNOSIS — Z29.9 ENCOUNTER FOR PROPHYLACTIC MEASURES, UNSPECIFIED: ICD-10-CM

## 2019-10-02 DIAGNOSIS — I10 ESSENTIAL (PRIMARY) HYPERTENSION: ICD-10-CM

## 2019-10-02 LAB
ALBUMIN SERPL ELPH-MCNC: 2.5 G/DL — LOW (ref 3.3–5)
ALBUMIN SERPL ELPH-MCNC: 2.9 G/DL — LOW (ref 3.3–5)
ALP SERPL-CCNC: 174 U/L — HIGH (ref 40–120)
ALP SERPL-CCNC: 189 U/L — HIGH (ref 40–120)
ALT FLD-CCNC: 29 U/L — SIGNIFICANT CHANGE UP (ref 4–33)
ALT FLD-CCNC: 30 U/L — SIGNIFICANT CHANGE UP (ref 4–33)
ANION GAP SERPL CALC-SCNC: 11 MMO/L — SIGNIFICANT CHANGE UP (ref 7–14)
ANION GAP SERPL CALC-SCNC: 12 MMO/L — SIGNIFICANT CHANGE UP (ref 7–14)
ANISOCYTOSIS BLD QL: SLIGHT — SIGNIFICANT CHANGE UP
APPEARANCE UR: SIGNIFICANT CHANGE UP
AST SERPL-CCNC: 40 U/L — HIGH (ref 4–32)
AST SERPL-CCNC: 49 U/L — HIGH (ref 4–32)
BACTERIA # UR AUTO: NEGATIVE — SIGNIFICANT CHANGE UP
BASOPHILS # BLD AUTO: 0 K/UL — SIGNIFICANT CHANGE UP (ref 0–0.2)
BASOPHILS NFR BLD AUTO: 0 % — SIGNIFICANT CHANGE UP (ref 0–2)
BASOPHILS NFR SPEC: 0 % — SIGNIFICANT CHANGE UP (ref 0–2)
BILIRUB SERPL-MCNC: 0.8 MG/DL — SIGNIFICANT CHANGE UP (ref 0.2–1.2)
BILIRUB SERPL-MCNC: 0.9 MG/DL — SIGNIFICANT CHANGE UP (ref 0.2–1.2)
BILIRUB UR-MCNC: SIGNIFICANT CHANGE UP
BLASTS # FLD: 0 % — SIGNIFICANT CHANGE UP (ref 0–0)
BLOOD UR QL VISUAL: NEGATIVE — SIGNIFICANT CHANGE UP
BUN SERPL-MCNC: 7 MG/DL — SIGNIFICANT CHANGE UP (ref 7–23)
BUN SERPL-MCNC: 8 MG/DL — SIGNIFICANT CHANGE UP (ref 7–23)
CALCIUM SERPL-MCNC: 8 MG/DL — LOW (ref 8.4–10.5)
CALCIUM SERPL-MCNC: 8.4 MG/DL — SIGNIFICANT CHANGE UP (ref 8.4–10.5)
CHLORIDE SERPL-SCNC: 100 MMOL/L — SIGNIFICANT CHANGE UP (ref 98–107)
CHLORIDE SERPL-SCNC: 102 MMOL/L — SIGNIFICANT CHANGE UP (ref 98–107)
CO2 SERPL-SCNC: 26 MMOL/L — SIGNIFICANT CHANGE UP (ref 22–31)
CO2 SERPL-SCNC: 27 MMOL/L — SIGNIFICANT CHANGE UP (ref 22–31)
COLOR SPEC: SIGNIFICANT CHANGE UP
CREAT SERPL-MCNC: 0.58 MG/DL — SIGNIFICANT CHANGE UP (ref 0.5–1.3)
CREAT SERPL-MCNC: 0.69 MG/DL — SIGNIFICANT CHANGE UP (ref 0.5–1.3)
EOSINOPHIL # BLD AUTO: 0 K/UL — SIGNIFICANT CHANGE UP (ref 0–0.5)
EOSINOPHIL NFR BLD AUTO: 0 % — SIGNIFICANT CHANGE UP (ref 0–6)
EOSINOPHIL NFR FLD: 0 % — SIGNIFICANT CHANGE UP (ref 0–6)
GLUCOSE SERPL-MCNC: 127 MG/DL — HIGH (ref 70–99)
GLUCOSE SERPL-MCNC: 96 MG/DL — SIGNIFICANT CHANGE UP (ref 70–99)
GLUCOSE UR-MCNC: NEGATIVE — SIGNIFICANT CHANGE UP
HCT VFR BLD CALC: 31.3 % — LOW (ref 34.5–45)
HGB BLD-MCNC: 9.8 G/DL — LOW (ref 11.5–15.5)
HYALINE CASTS # UR AUTO: NEGATIVE — SIGNIFICANT CHANGE UP
HYPOCHROMIA BLD QL: SLIGHT — SIGNIFICANT CHANGE UP
IMM GRANULOCYTES NFR BLD AUTO: 0.7 % — SIGNIFICANT CHANGE UP (ref 0–1.5)
KETONES UR-MCNC: SIGNIFICANT CHANGE UP
LEUKOCYTE ESTERASE UR-ACNC: SIGNIFICANT CHANGE UP
LYMPHOCYTES # BLD AUTO: 1.24 K/UL — SIGNIFICANT CHANGE UP (ref 1–3.3)
LYMPHOCYTES # BLD AUTO: 41.8 % — SIGNIFICANT CHANGE UP (ref 13–44)
LYMPHOCYTES NFR SPEC AUTO: 17.6 % — SIGNIFICANT CHANGE UP (ref 13–44)
MACROCYTES BLD QL: SLIGHT — SIGNIFICANT CHANGE UP
MAGNESIUM SERPL-MCNC: 1.3 MG/DL — LOW (ref 1.6–2.6)
MANUAL SMEAR VERIFICATION: SIGNIFICANT CHANGE UP
MCHC RBC-ENTMCNC: 26.5 PG — LOW (ref 27–34)
MCHC RBC-ENTMCNC: 31.3 % — LOW (ref 32–36)
MCV RBC AUTO: 84.6 FL — SIGNIFICANT CHANGE UP (ref 80–100)
METAMYELOCYTES # FLD: 0 % — SIGNIFICANT CHANGE UP (ref 0–1)
MONOCYTES # BLD AUTO: 0.25 K/UL — SIGNIFICANT CHANGE UP (ref 0–0.9)
MONOCYTES NFR BLD AUTO: 8.4 % — SIGNIFICANT CHANGE UP (ref 2–14)
MONOCYTES NFR BLD: 7 % — SIGNIFICANT CHANGE UP (ref 2–9)
MYELOCYTES NFR BLD: 0 % — SIGNIFICANT CHANGE UP (ref 0–0)
NEUTROPHIL AB SER-ACNC: 60.5 % — SIGNIFICANT CHANGE UP (ref 43–77)
NEUTROPHILS # BLD AUTO: 1.46 K/UL — LOW (ref 1.8–7.4)
NEUTROPHILS NFR BLD AUTO: 49.1 % — SIGNIFICANT CHANGE UP (ref 43–77)
NEUTS BAND # BLD: 3.5 % — SIGNIFICANT CHANGE UP (ref 0–6)
NITRITE UR-MCNC: NEGATIVE — SIGNIFICANT CHANGE UP
NRBC # FLD: 0 K/UL — SIGNIFICANT CHANGE UP (ref 0–0)
OTHER - HEMATOLOGY %: 0 — SIGNIFICANT CHANGE UP
OVALOCYTES BLD QL SMEAR: SLIGHT — SIGNIFICANT CHANGE UP
PH UR: 6.5 — SIGNIFICANT CHANGE UP (ref 5–8)
PHOSPHATE SERPL-MCNC: 3.3 MG/DL — SIGNIFICANT CHANGE UP (ref 2.5–4.5)
PLATELET # BLD AUTO: 88 K/UL — LOW (ref 150–400)
PLATELET # BLD AUTO: 96 K/UL — LOW (ref 150–400)
PLATELET COUNT - ESTIMATE: SIGNIFICANT CHANGE UP
PMV BLD: 10.1 FL — SIGNIFICANT CHANGE UP (ref 7–13)
PMV BLD: 8.9 FL — SIGNIFICANT CHANGE UP (ref 7–13)
POIKILOCYTOSIS BLD QL AUTO: SLIGHT — SIGNIFICANT CHANGE UP
POTASSIUM SERPL-MCNC: 3.4 MMOL/L — LOW (ref 3.5–5.3)
POTASSIUM SERPL-MCNC: 4.2 MMOL/L — SIGNIFICANT CHANGE UP (ref 3.5–5.3)
POTASSIUM SERPL-SCNC: 3.4 MMOL/L — LOW (ref 3.5–5.3)
POTASSIUM SERPL-SCNC: 4.2 MMOL/L — SIGNIFICANT CHANGE UP (ref 3.5–5.3)
PROMYELOCYTES # FLD: 0 % — SIGNIFICANT CHANGE UP (ref 0–0)
PROT SERPL-MCNC: 5.9 G/DL — LOW (ref 6–8.3)
PROT SERPL-MCNC: 6.2 G/DL — SIGNIFICANT CHANGE UP (ref 6–8.3)
PROT UR-MCNC: 70 — SIGNIFICANT CHANGE UP
RBC # BLD: 3.7 M/UL — LOW (ref 3.8–5.2)
RBC # FLD: 14.1 % — SIGNIFICANT CHANGE UP (ref 10.3–14.5)
RBC CASTS # UR COMP ASSIST: HIGH (ref 0–?)
SCHISTOCYTES BLD QL AUTO: SLIGHT — SIGNIFICANT CHANGE UP
SODIUM SERPL-SCNC: 139 MMOL/L — SIGNIFICANT CHANGE UP (ref 135–145)
SODIUM SERPL-SCNC: 139 MMOL/L — SIGNIFICANT CHANGE UP (ref 135–145)
SP GR SPEC: 1.03 — SIGNIFICANT CHANGE UP (ref 1–1.04)
SPECIMEN SOURCE: SIGNIFICANT CHANGE UP
SPECIMEN SOURCE: SIGNIFICANT CHANGE UP
SQUAMOUS # UR AUTO: SIGNIFICANT CHANGE UP
UROBILINOGEN FLD QL: NORMAL — SIGNIFICANT CHANGE UP
VARIANT LYMPHS # BLD: 7.9 % — SIGNIFICANT CHANGE UP
WBC # BLD: 2.97 K/UL — LOW (ref 3.8–10.5)
WBC # FLD AUTO: 2.97 K/UL — LOW (ref 3.8–10.5)
WBC UR QL: >50 — HIGH (ref 0–?)

## 2019-10-02 PROCEDURE — 99223 1ST HOSP IP/OBS HIGH 75: CPT | Mod: GC

## 2019-10-02 PROCEDURE — 74177 CT ABD & PELVIS W/CONTRAST: CPT | Mod: 26

## 2019-10-02 PROCEDURE — 99223 1ST HOSP IP/OBS HIGH 75: CPT

## 2019-10-02 RX ORDER — VANCOMYCIN HCL 1 G
1000 VIAL (EA) INTRAVENOUS EVERY 12 HOURS
Refills: 0 | Status: DISCONTINUED | OUTPATIENT
Start: 2019-10-02 | End: 2019-10-04

## 2019-10-02 RX ORDER — MORPHINE SULFATE 50 MG/1
4 CAPSULE, EXTENDED RELEASE ORAL EVERY 4 HOURS
Refills: 0 | Status: DISCONTINUED | OUTPATIENT
Start: 2019-10-02 | End: 2019-10-07

## 2019-10-02 RX ORDER — VANCOMYCIN HCL 1 G
VIAL (EA) INTRAVENOUS
Refills: 0 | Status: DISCONTINUED | OUTPATIENT
Start: 2019-10-02 | End: 2019-10-04

## 2019-10-02 RX ORDER — PIPERACILLIN AND TAZOBACTAM 4; .5 G/20ML; G/20ML
3.38 INJECTION, POWDER, LYOPHILIZED, FOR SOLUTION INTRAVENOUS EVERY 8 HOURS
Refills: 0 | Status: DISCONTINUED | OUTPATIENT
Start: 2019-10-02 | End: 2019-10-04

## 2019-10-02 RX ORDER — FERROUS SULFATE 325(65) MG
0 TABLET ORAL
Qty: 0 | Refills: 0 | DISCHARGE

## 2019-10-02 RX ORDER — CILOSTAZOL 100 MG/1
1 TABLET ORAL
Qty: 0 | Refills: 0 | DISCHARGE

## 2019-10-02 RX ORDER — ENOXAPARIN SODIUM 100 MG/ML
40 INJECTION SUBCUTANEOUS DAILY
Refills: 0 | Status: DISCONTINUED | OUTPATIENT
Start: 2019-10-02 | End: 2019-10-07

## 2019-10-02 RX ORDER — VANCOMYCIN HCL 1 G
1000 VIAL (EA) INTRAVENOUS ONCE
Refills: 0 | Status: COMPLETED | OUTPATIENT
Start: 2019-10-02 | End: 2019-10-02

## 2019-10-02 RX ORDER — ALBUTEROL 90 UG/1
2 AEROSOL, METERED ORAL
Qty: 0 | Refills: 0 | DISCHARGE

## 2019-10-02 RX ORDER — MORPHINE SULFATE 50 MG/1
2 CAPSULE, EXTENDED RELEASE ORAL EVERY 4 HOURS
Refills: 0 | Status: DISCONTINUED | OUTPATIENT
Start: 2019-10-02 | End: 2019-10-07

## 2019-10-02 RX ORDER — SODIUM CHLORIDE 9 MG/ML
1000 INJECTION INTRAMUSCULAR; INTRAVENOUS; SUBCUTANEOUS ONCE
Refills: 0 | Status: COMPLETED | OUTPATIENT
Start: 2019-10-02 | End: 2019-10-02

## 2019-10-02 RX ORDER — SODIUM,POTASSIUM PHOSPHATES 278-250MG
1 POWDER IN PACKET (EA) ORAL ONCE
Refills: 0 | Status: COMPLETED | OUTPATIENT
Start: 2019-10-02 | End: 2019-10-02

## 2019-10-02 RX ORDER — ONDANSETRON 8 MG/1
4 TABLET, FILM COATED ORAL EVERY 6 HOURS
Refills: 0 | Status: DISCONTINUED | OUTPATIENT
Start: 2019-10-02 | End: 2019-10-07

## 2019-10-02 RX ADMIN — Medication 1 PACKET(S): at 02:10

## 2019-10-02 RX ADMIN — PIPERACILLIN AND TAZOBACTAM 25 GRAM(S): 4; .5 INJECTION, POWDER, LYOPHILIZED, FOR SOLUTION INTRAVENOUS at 15:59

## 2019-10-02 RX ADMIN — SODIUM CHLORIDE 1000 MILLILITER(S): 9 INJECTION INTRAMUSCULAR; INTRAVENOUS; SUBCUTANEOUS at 05:46

## 2019-10-02 RX ADMIN — MORPHINE SULFATE 4 MILLIGRAM(S): 50 CAPSULE, EXTENDED RELEASE ORAL at 17:26

## 2019-10-02 RX ADMIN — Medication 250 MILLIGRAM(S): at 23:00

## 2019-10-02 RX ADMIN — Medication 250 MILLIGRAM(S): at 00:58

## 2019-10-02 RX ADMIN — ONDANSETRON 4 MILLIGRAM(S): 8 TABLET, FILM COATED ORAL at 23:29

## 2019-10-02 RX ADMIN — MORPHINE SULFATE 4 MILLIGRAM(S): 50 CAPSULE, EXTENDED RELEASE ORAL at 15:00

## 2019-10-02 RX ADMIN — ONDANSETRON 4 MILLIGRAM(S): 8 TABLET, FILM COATED ORAL at 15:00

## 2019-10-02 RX ADMIN — Medication 250 MILLIGRAM(S): at 12:14

## 2019-10-02 NOTE — H&P ADULT - NSICDXPASTMEDICALHX_GEN_ALL_CORE_FT
PAST MEDICAL HISTORY:  HTN (hypertension)     Iron deficiency anemia     Pancreatic adenocarcinoma Stage IV    Peripheral vascular disease RLE s/p stenting; R femorofemoral bypass in 2018    Smoking history ~ quit ~ 2017

## 2019-10-02 NOTE — H&P ADULT - PROBLEM SELECTOR PLAN 3
Likely induced by recent chemotherapy Noted temporal muscle wasting on exam and hypoalbuminemia  - nutrition consult  - PT consult

## 2019-10-02 NOTE — H&P ADULT - NSICDXFAMILYHX_GEN_ALL_CORE_FT
FAMILY HISTORY:  Family history of cancer, ~ mother (unknown type)  Family history of hypertension, ~ mother FAMILY HISTORY:  Family history of cancer, ~ mother (unknown type)  Family history of hypertension, ~ mother  FH: hypertension

## 2019-10-02 NOTE — H&P ADULT - PROBLEM SELECTOR PLAN 5
Noted on CT abd/pelvis with associated findings c/w colitis Likely induced by recent chemotherapy +/- acute infection. ANC currently >500. No signs of active bleeding on exam  - monitor blood counts

## 2019-10-02 NOTE — CONSULT NOTE ADULT - ATTENDING COMMENTS
Pt seen examined and d/w fellow. History as above confirmed by pt at the bedside. H/O met pancreatic cancer with new onset N/V/abd pain as noted above. PE remarkable for mild abd distension, no active BS, soft but with LUQ tendernesss and some guarding / no rebound  A/P. Agree with above a/p in light of her history and exam. Cont abx, serial exams and low threshold for repeat rad eval if abd exam changes.

## 2019-10-02 NOTE — H&P ADULT - NSHPSOCIALHISTORY_GEN_ALL_CORE
Patient lives at home with her son. She ambulates with a cane. She is able to perform ADLs independently. Patient is a former cigarette user, quit 2018 and never drank EtOH or used illicit drugs.

## 2019-10-02 NOTE — CONSULT NOTE ADULT - ASSESSMENT
68F w/ Stage IV pancreatic adenocarcinoma (on Castro/Abraxane), presenting from home with abdominal pain.    # Abdominal pain:  - CT A/P done - showing colitis, no free air   - Patient is very tender to palpation in LUQ, however abdomen is soft and not distended  - would monitor abdominal exams, if worsening please check UPRIGHT CXR for concern for GI microperforations  - abx per primary team for colitis     # Pancreatic Adenocarcinoma:   - on Gemcitabine/Abraxane, started Sept 2019  - last dose on 9/26  - will follow-up with Dr. Tong Epps as an outpatient at [facility]    Mariposa Mendoza MD  Hematology/Oncology Fellow, PGY-5  pager: [phone]  After 5pm or on weekends, please page the on-call fellow.

## 2019-10-02 NOTE — H&P ADULT - PROBLEM SELECTOR PLAN 7
- IMPROVE score 3 --> lovenox 40mg daily for DVT ppx  - contacted pt's PMD office, Dr. Robles [phone] to notify him that patient has been hospitalized. Pt will need outpatient f/u upon discharge  - med reconciliation completed

## 2019-10-02 NOTE — H&P ADULT - PROBLEM SELECTOR PLAN 1
Possible  vs. GI source given UA findings concerning for UTI and CT abd/pelvis commenting on signs of colitis in patient who is severely immunocompromised Suspect GI source given presenting symptoms and CT findings. Positive UA findings but patient w/o any urinary complaints. Thus less likely GI source.  - f/u blood cx x2, urine cx  - obtain GI stool PCR, stool cx  - continue empiric vancomycin 1g q12h, vanc trough before 4 dose tomorrow AM and Zosyn 3.375mg q8h, pending results of infectious w/u  - if pt with persistent diarrhea, will then obtain C. diff PCR. However, pt not with diarrhea currently

## 2019-10-02 NOTE — CONSULT NOTE ADULT - SUBJECTIVE AND OBJECTIVE BOX
HPI:  Ms. Garcia is a 67 yo woman with medical history notable for Stage IV pancreatic adenocarcinoma c/b biliary obstruction undergoing active chemo, COPD, chronic anemia, HTN, PVD s/p RLE stenting and other co-morbidities presenting from home with complaints of weakness, abdominal pain, n/v.     Patient is currently undergoing chemo, last administered on . Patient initially developed abdominal discomfort with decreased appetite and oral intake on Monday, . Later on that day, she develop nausea with subsequent non-bloody emesis. When she tried to eat or take her nausea or pain medications at home, she would vomit. She also reports having diarrhea. No subjective fevers or chills. No chest pain, SOB, dysuria or changes in urinary frequency. Due to her symptoms, her son decided to take her to the hospital.    While in the ED, patient received empiric dosing of vancomycin, Zosyn, 2.6L of IV normal saline. (02 Oct 2019 10:44)      ONCOLOGIC HISTORY:   Disease: Pancreatic Adenocarcinoma    AJCC Stage: IV     negative for Gilbert's disease     Current Treatment Status:   Newly diagnosed - Gemcitabine/Abraxane started 19: Gemcitabine alone  : Gemcitabine/Abraxane       PAST MEDICAL & SURGICAL HISTORY:  Pancreatic adenocarcinoma: Stage IV  Peripheral vascular disease: RLE s/p stenting; R femorofemoral bypass in 2018  Iron deficiency anemia  Smoking history: ~ quit ~ 2017  HTN (hypertension)  Status post femorofemoral bypass surgery: Right lower extremity  S/P hysterectomy  History of intravascular stent placement: ~ RLE ([facility] ~ 2018)  History of  section      Allergies  No Known Allergies        MEDICATIONS  (STANDING):  enoxaparin Injectable 40 milliGRAM(s) SubCutaneous daily  piperacillin/tazobactam IVPB.. 3.375 Gram(s) IV Intermittent every 8 hours  vancomycin  IVPB 1000 milliGRAM(s) IV Intermittent every 12 hours  vancomycin  IVPB        MEDICATIONS  (PRN):  morphine  - Injectable 2 milliGRAM(s) IV Push every 4 hours PRN Moderate Pain (4 - 6)  morphine  - Injectable 4 milliGRAM(s) IV Push every 4 hours PRN Severe Pain (7 - 10)  ondansetron Injectable 4 milliGRAM(s) IV Push every 6 hours PRN Nausea      FAMILY HISTORY:  FH: hypertension  Family history of cancer: ~ mother (unknown type)  Family history of hypertension: ~ mother      SOCIAL HISTORY: No EtOH, no tobacco      REVIEW OF SYSTEMS:  CONSTITUTIONAL: +CHILLS  EYES/ENT: No visual changes;  No vertigo or throat pain   NECK: No pain or stiffness  RESPIRATORY: No cough, wheezing, hemoptysis; No shortness of breath  CARDIOVASCULAR: No chest pain or palpitations  GASTROINTESTINAL: +ABDOMINAL PAIN AND DIARRHEA,  No nausea, vomiting, or hematemesis, No melena or hematochezia.  GENITOURINARY: No dysuria, frequency or hematuria  NEUROLOGICAL: No numbness or weakness  SKIN: No itching, burning, rashes, or lesions   All other review of systems is negative unless indicated above.        T(F): 98.1 (10-02-19 @ 13:17), Max: 101.1 (10-01-19 @ 23:24)  HR: 85 (10-02-19 @ 14:57)  BP: 113/72 (10-02-19 @ 14:57)  RR: 16 (10-02-19 @ 14:57)  SpO2: 100% (10-02-19 @ 14:57)      GENERAL: NAD, well-developed  HEAD:  Atraumatic, Normocephalic  EYES: EOMI, conjunctiva and sclera clear  NECK: Supple  CHEST/LUNG: Clear to auscultation bilaterally; No wheezes or crackles   HEART: Regular rate and rhythm; No murmurs, rubs, or gallops  ABDOMEN: Soft, TTP in LUQ, no rebound tenderness; hypoactive bowel sounds   EXTREMITIES: No clubbing, cyanosis, or edema  NEUROLOGY: non-focal  SKIN: No rashes or lesions                          9.8    2.97  )-----------( 88       ( 02 Oct 2019 11:17 )             31.3       10-02    139  |  102  |  7   ----------------------------<  96  4.2   |  26  |  0.69    Ca    8.0<L>      02 Oct 2019 11:11  Phos  3.3     10-02  Mg     1.3     10-02    TPro  5.9<L>  /  Alb  2.5<L>  /  TBili  0.8  /  DBili  x   /  AST  40<H>  /  ALT  30  /  AlkPhos  174<H>  10-02      Phosphorus Level, Serum: 3.3 mg/dL (10-02 @ 11:11)  Magnesium, Serum: 1.3 mg/dL (10-02 @ 11:11)      PT/INR - ( 01 Oct 2019 23:20 )   PT: 13.0 SEC;   INR: 1.14          PTT - ( 01 Oct 2019 23:20 )  PTT:23.1 SEC      RADIOLOGY:  EXAM:  CT ABDOMEN AND PELVIS IC      PROCEDURE DATE:  Oct  2 2019     INTERPRETATION:  CLINICAL INFORMATION: Abdominal pain and diarrhea.    COMPARISON: CT abdomen pelvis 2019, 2019.    PROCEDURE: CT of the Abdomen and Pelvis was performed with intravenous contrast.   Intravenous contrast: 90 ml Omnipaque 350. 10 ml discarded.  Oral contrast: None.  Sagittal and coronal reformats were performed.    FINDINGS:    LOWER CHEST: Partially imaged right middle lobe nodule. Additional unchanged pulmonary nodule within the left lower lobe. Left lower lobe pulmonary cyst.    LIVER: Scattered subcentimeter hypodensities are too small to characterize.  BILE DUCTS: Pneumobilia secondary to CBD stent.  GALLBLADDER: Cholelithiasis. Gallbladder wall thickening.    SPLEEN: Within normal limits.  PANCREAS: Pancreatic head mass with patient's known pancreatic cancer. Abrupt cut off and dilation of the pancreatic duct. Peripancreatic fat stranding and fat infiltration. Increased number of subcentimeter short   axis and mildly enlarged peripancreatic and retroperitoneal nodes.  ADRENALS: Within normal limits.    KIDNEYS/URETERS: Within normal limits.  BLADDER: Within normal limits.  REPRODUCTIVE ORGANS: Hysterectomy.    BOWEL: Thickening of the cecum consistent with colitis. Fluid-filled loops of small bowel due to ileus. No bowel obstruction. Normal appendix.   PERITONEUM: No free air.  VESSELS: Atherosclerotic changes. Extensive atherosclerotic disease with chronic occlusion of the right common, external and internal iliac arteries. The femorofemoral bypass is not opacified.  RETROPERITONEUM/LYMPH NODES: Peripancreatic lymphadenopathy.    ABDOMINAL WALL: Anasarca.  BONES: Degenerative changes.    IMPRESSION:     Cecal colitis may reflect typhlitis if patient is neutropenic. Small bowel ileus with probable enteritis. No bowel obstruction or free air.    Pancreatic head mass consistent with patient's known pancreatic cancer status post CBD stent and pneumobilia.    Age-indeterminate femorofemoral bypass occlusion.    Findings discussed by Dr. Werner with Dr. Rider on 10/2/2019 at 4:30 AM with read back.        LEEROY WRENER M.D., RADIOLOGY RESIDENT  This document has been electronically signed.  LIZETTE SUBRAMANIAN M.D., ATTENDING RADIOLOGIST  This document has been electronically signed. Oct  2 2019  5:05AM

## 2019-10-02 NOTE — CONSULT NOTE ADULT - SUBJECTIVE AND OBJECTIVE BOX
VASCULAR SURGERY CONSULT NOTE    Chief Complaint: Abdominal pain  HPI: 68F presents with abdominal pain. Has HA, no CP, SOB. Abdominal pain improved since arriving to ED. States that she ambulates minimally at baseline. Followed by Dr. Lr as an outpatient, last seen in office July of this year. No acute changes in lower extremity pain over baseline. No N/V/F/C.    PAST MEDICAL & SURGICAL HISTORY:  Iron deficiency anemia  Claudication: ~ chiefly of RLE  Smoking history: ~ quit ~   HTN (hypertension)  History of colonoscopy: ~   History of intravascular stent placement: ~ RLE ([facility] ~ 2018)  History of  section    MEDICATIONS  (STANDING):    MEDICATIONS  (PRN):    Allergies    No Known Allergies    Intolerances        SOCIAL HISTORY   Smoking: Quit smoking in 2017 after 40 years  ETOH:  No  Drugs: No  Other:     FAMILY HISTORY:  Family history of cancer: ~ mother (unknown type)  Family history of hypertension: ~ mother    Vital Signs Last 24 Hrs  T(C): 37.1 (02 Oct 2019 05:48), Max: 38.4 (01 Oct 2019 23:24)  T(F): 98.8 (02 Oct 2019 05:48), Max: 101.1 (01 Oct 2019 23:24)  HR: 79 (02 Oct 2019 05:48) (79 - 127)  BP: 101/63 (02 Oct 2019 05:48) (91/52 - 116/82)  BP(mean): --  RR: 17 (02 Oct 2019 05:48) (14 - 18)  SpO2: 100% (02 Oct 2019 05:48) (96% - 100%)      Physical Exam    PHYSICAL EXAM:  General: NAD, Lying in bed comfortably  Neuro: AAO  HEENT: NC/AT, EOMI  Neck: Soft, supple  Cardio: Audible S1/S2, RRR, No M/R/G  Resp: Good effort, CTAB  GI/Abd: Soft, Diffuse TTP, no rebound/guarding, no masses palpated  Vascular: B/l radial pulses palpable, b/l palpable femoral pulses. b/l palpable DP pulses. No palpable abdominal pulsatile mass. No LE ulcerations  Skin: Intact, no breakdown  Lymphatic/Nodes: No palpable lymphadenopathy  Musculoskeletal: All 4 extremities moving spontaneously, no limitations    LABS                        9.8    1.78  )-----------( 96       ( 01 Oct 2019 23:20 )             29.7     10-    139  |  100  |  8   ----------------------------<  127<H>  3.4<L>   |  27  |  0.58    Ca    8.4      01 Oct 2019 23:20    TPro  6.2  /  Alb  2.9<L>  /  TBili  0.9  /  DBili  x   /  AST  49<H>  /  ALT  29  /  AlkPhos  189<H>  10    LIVER FUNCTIONS - ( 01 Oct 2019 23:20 )  Alb: 2.9 g/dL / Pro: 6.2 g/dL / ALK PHOS: 189 u/L / ALT: 29 u/L / AST: 49 u/L / GGT: x           PT/INR - ( 01 Oct 2019 23:20 )   PT: 13.0 SEC;   INR: 1.14          PTT - ( 01 Oct 2019 23:20 )  PTT:23.1 SEC  Urinalysis Basic - ( 02 Oct 2019 03:30 )    Color: DARK YELLOW / Appearance: Lt TURBID / S.035 / pH: 6.5  Gluc: NEGATIVE / Ketone: TRACE  / Bili: TRACE / Urobili: NORMAL   Blood: NEGATIVE / Protein: 70 / Nitrite: NEGATIVE   Leuk Esterase: LARGE / RBC: 6-10 / WBC >50   Sq Epi: FEW / Non Sq Epi: x / Bacteria: NEGATIVE    RADIOLOGY & ADDITIONAL STUDIES:    VESSELS: Atherosclerotic changes. Extensive atherosclerotic disease with   chronic occlusion of the right common, external and internal iliac   arteries. The femorofemoral bypass is not opacified.      Assessment/Plan: 68F with occlusion of fem-fem bypass visualized on CT scan, likely chronic in nature and not related to her abdominal pain. Multiple collatoral vessels identified with palpable distal pulses. No evidence of limb ischemia and the patient has no symptoms of acute vascular compromise to her lower extremities.    No acute vascular surgery intervention at this time given likely chronic nature of fem-fem occlusion.  Continue current home meds.  Pain control  Patient can follow-up as an outpatient with Dr. Gould  Discussed with the vascular surgery fellows

## 2019-10-02 NOTE — CHART NOTE - NSCHARTNOTEFT_GEN_A_CORE
pt has stage IV pancreatic cancer- Undergoing active chemo with gemcitabine. tai pt, consulted house onc.

## 2019-10-02 NOTE — H&P ADULT - HISTORY OF PRESENT ILLNESS
Ms. Garcia is a 67 yo woman with medical history notable for Stage IV pancreatic adenocarcinoma c/b biliary obstruction undergoing active chemo, COPD, chronic anemia, HTN, PVD s/p RLE stenting and other co-morbidities presenting from home with weakness         While in the ED, patient received empiric dosing of vancomycin, Zosyn, 2.6L of IV normal saline. Ms. Garcia is a 67 yo woman with medical history notable for Stage IV pancreatic adenocarcinoma c/b biliary obstruction undergoing active chemo, COPD, chronic anemia, HTN, PVD s/p RLE stenting and other co-morbidities presenting from home with cheif complaints of weakness, abdominal pain, n/v.     Patient is currently undergoing chemo, last administered on 9/25. Patient initially developed abdominal discomfort with decreased appetite and oral intake on Monday, 9/30. Later on that day, she develop nausea with subsequent non-bloody emesis. When she tried to eat or take her nausea or pain medications at home, she would vomit. She also reports having diarrhea. No subjective fevers or chills. No chest pain, SOB, dysuria or changes in urinary frequency. Due to her symptoms, her son decided to take her to the hospital.    While in the ED, patient received empiric dosing of vancomycin, Zosyn, 2.6L of IV normal saline. Ms. Garcia is a 67 yo woman with medical history notable for Stage IV pancreatic adenocarcinoma c/b biliary obstruction undergoing active chemo, COPD, chronic anemia, HTN, PVD s/p RLE stenting and other co-morbidities presenting from home with complaints of weakness, abdominal pain, n/v.     Patient is currently undergoing chemo, last administered on 9/25. Patient initially developed abdominal discomfort with decreased appetite and oral intake on Monday, 9/30. Later on that day, she develop nausea with subsequent non-bloody emesis. When she tried to eat or take her nausea or pain medications at home, she would vomit. She also reports having diarrhea. No subjective fevers or chills. No chest pain, SOB, dysuria or changes in urinary frequency. Due to her symptoms, her son decided to take her to the hospital.    While in the ED, patient received empiric dosing of vancomycin, Zosyn, 2.6L of IV normal saline.

## 2019-10-02 NOTE — H&P ADULT - NSICDXPASTSURGICALHX_GEN_ALL_CORE_FT
PAST SURGICAL HISTORY:  History of  section     History of intravascular stent placement ~ RLE ([facility] ~ 2018)    S/P hysterectomy     Status post femorofemoral bypass surgery Right lower extremity PAST SURGICAL HISTORY:  History of  section     History of intravascular stent placement ~ RLE ([facility] ~ 2018)    S/P hysterectomy     Status post femorofemoral bypass surgery Right lower extremity

## 2019-10-02 NOTE — H&P ADULT - PROBLEM SELECTOR PLAN 4
- hold home regimen of antihypertensive meds for now while pt meeting sepsis criteria Undergoing active chemo with gemcitabine, ??Abraxane   - oncology consult

## 2019-10-02 NOTE — PATIENT PROFILE ADULT - VISION (WITH CORRECTIVE LENSES IF THE PATIENT USUALLY WEARS THEM):
Partially impaired: cannot see medication labels or newsprint, but can see obstacles in path, and the surrounding layout; can count fingers at arm's length/Rx glasses

## 2019-10-02 NOTE — H&P ADULT - PROBLEM SELECTOR PLAN 6
BP at acceptable range  - hold home regimen of amlodipine for now while pt meeting sepsis criteria.   - monitor BP

## 2019-10-02 NOTE — H&P ADULT - PROBLEM SELECTOR PLAN 2
Undergoing active chemo with gemcitabine, ??Abraxane   - oncology consult Noted on CT abd/pelvis with associated findings c/w colitis. Obstruction r/o. Patient passing flatus but still with no appetite. +BS.   - NPO for now, advance diet as tolerated   - use opioids sparingly

## 2019-10-02 NOTE — H&P ADULT - NSHPOUTPATIENTPROVIDERS_GEN_ALL_CORE
Oncology: Dr. Tong Epps ([facility]) Oncology: Dr. Tong Epps ([facility])  PCP: Dr. Zackery Robles [phone]

## 2019-10-02 NOTE — H&P ADULT - NSHPPHYSICALEXAM_GEN_ALL_CORE
Vital Signs Last 24 Hrs  T(C): 37.1 (02 Oct 2019 05:48), Max: 38.4 (01 Oct 2019 23:24)  T(F): 98.8 (02 Oct 2019 05:48), Max: 101.1 (01 Oct 2019 23:24)  HR: 78 (02 Oct 2019 09:38) (78 - 127)  BP: 100/62 (02 Oct 2019 09:38) (91/52 - 116/82)  BP(mean): --  RR: 18 (02 Oct 2019 09:38) (14 - 18)  SpO2: 100% (02 Oct 2019 09:38) (96% - 100%)        PHYSICAL EXAM  GENERAL: NAD, well-developed  HEAD:  Atraumatic, Normocephalic  EYES: EOMI, PERRLA, conjunctiva and sclera clear  NECK: Supple, No JVD  CHEST/LUNG: Clear to auscultation bilaterally; No wheeze  HEART: Regular rate and rhythm; No murmurs, rubs, or gallops  ABDOMEN: Soft, Nontender, Nondistended; Bowel sounds present  EXTREMITIES:  2+ Peripheral Pulses, No clubbing, cyanosis, or edema  PSYCH: AAOx3  SKIN: No rashes or lesions Vital Signs Last 24 Hrs  T(C): 37.1 (02 Oct 2019 05:48), Max: 38.4 (01 Oct 2019 23:24)  T(F): 98.8 (02 Oct 2019 05:48), Max: 101.1 (01 Oct 2019 23:24)  HR: 78 (02 Oct 2019 09:38) (78 - 127)  BP: 100/62 (02 Oct 2019 09:38) (91/52 - 116/82)  BP(mean): --  RR: 18 (02 Oct 2019 09:38) (14 - 18)  SpO2: 100% (02 Oct 2019 09:38) (96% - 100%)        PHYSICAL EXAM  GENERAL: NAD, chronically ill but not toxic appearing  HEAD:  Bitemporal muscle wasting  EYES: EOMI, PERRLA, conjunctiva and sclera clear  CHEST/LUNG: Clear to auscultation bilaterally; No wheeze. R sided chest wall port  HEART: Regular rate and rhythm; No murmurs, rubs, or gallops  ABDOMEN: Soft, diffuse tenderness, Nondistended; Bowel sounds present. No rebound or guarding  EXTREMITIES:  2+ Peripheral Pulses, No clubbing, cyanosis, or edema  PSYCH: AAOx3

## 2019-10-02 NOTE — H&P ADULT - ASSESSMENT
Ms. Garcia is a 69 yo woman with medical history notable for Stage IV pancreatic adenocarcinoma c/b prior biliary obstruction undergoing active chemo, COPD, chronic anemia, HTN, PVD s/p RLE stenting and bypass and other co-morbidities presenting from home with complaints of weakness, abdominal pain, n/v, admitted meeting SIRS vs. sepsis criteria, found to have small bowel ileus, likely chemo-induced pancytopenia, and severe protein calorie malnutrition.

## 2019-10-02 NOTE — H&P ADULT - NSHPLABSRESULTS_GEN_ALL_CORE
LABS:                        9.8    1.78  )-----------( 96       ( 01 Oct 2019 23:20 )             29.7     10    139  |  100  |  8   ----------------------------<  127<H>  3.4<L>   |  27  |  0.58    Ca    8.4      01 Oct 2019 23:20    TPro  6.2  /  Alb  2.9<L>  /  TBili  0.9  /  DBili  x   /  AST  49<H>  /  ALT  29  /  AlkPhos  189<H>  10-01      PT/INR - ( 01 Oct 2019 23:20 )   PT: 13.0 SEC;   INR: 1.14          PTT - ( 01 Oct 2019 23:20 )  PTT:23.1 SEC      Urinalysis Basic - ( 02 Oct 2019 03:30 )    Color: DARK YELLOW / Appearance: Lt TURBID / S.035 / pH: 6.5  Gluc: NEGATIVE / Ketone: TRACE  / Bili: TRACE / Urobili: NORMAL   Blood: NEGATIVE / Protein: 70 / Nitrite: NEGATIVE   Leuk Esterase: LARGE / RBC: 6-10 / WBC >50   Sq Epi: FEW / Non Sq Epi: x / Bacteria: NEGATIVE      VBG 10-01 @ 23:25  pH: 7.46/pCO2: 41 /pO2: 35/HCO3: 28/lactate: 1.6      EKG:    RADIOLOGY & ADDITIONAL TESTS:      EXAM:  CT ABDOMEN AND PELVIS IC    PROCEDURE DATE:  Oct  2 2019     FINDINGS:  LOWER CHEST: Partially imaged right middle lobe nodule. Additional   unchanged pulmonary nodule within the left lower lobe. Left lower lobe   pulmonary cyst.    LIVER: Scattered subcentimeter hypodensities are too small to   characterize.  BILE DUCTS: Pneumobilia secondary to CBD stent.  GALLBLADDER: Cholelithiasis. Gallbladder wall thickening.    SPLEEN: Within normal limits.  PANCREAS: Pancreatic head mass with patient's known pancreatic cancer.   Abrupt cut off and dilation of the pancreatic duct. Peripancreatic fat   stranding and fat infiltration. Increased number of subcentimeter short   axis and mildly enlarged peripancreatic and retroperitoneal nodes.  ADRENALS: Within normal limits.    KIDNEYS/URETERS: Within normal limits.  BLADDER: Within normal limits.  REPRODUCTIVE ORGANS: Hysterectomy.    BOWEL: Thickening of the cecum consistent with colitis. Fluid-filled   loops of small bowel due to ileus. No bowel obstruction. Normal appendix.   PERITONEUM: No free air.  VESSELS: Atherosclerotic changes. Extensive atherosclerotic disease with   chronic occlusion of the right common, external and internal iliac   arteries. The femorofemoral bypass is not opacified.  RETROPERITONEUM/LYMPH NODES: Peripancreatic lymphadenopathy.    ABDOMINAL WALL: Anasarca.  BONES: Degenerative changes.    IMPRESSION:   Cecal colitis may reflect typhlitis if patient is neutropenic. Small   bowel ileus with probable enteritis. No bowel obstruction or free air.    Pancreatic head mass consistent with patient's known pancreatic cancer   status post CBD stent and pneumobilia.    Age-indeterminate femorofemoral bypass occlusion.

## 2019-10-03 ENCOUNTER — RX RENEWAL (OUTPATIENT)
Age: 69
End: 2019-10-03

## 2019-10-03 DIAGNOSIS — K52.9 NONINFECTIVE GASTROENTERITIS AND COLITIS, UNSPECIFIED: ICD-10-CM

## 2019-10-03 LAB
ALBUMIN SERPL ELPH-MCNC: 2.4 G/DL — LOW (ref 3.3–5)
ALP SERPL-CCNC: 155 U/L — HIGH (ref 40–120)
ALT FLD-CCNC: 26 U/L — SIGNIFICANT CHANGE UP (ref 4–33)
ANION GAP SERPL CALC-SCNC: 12 MMO/L — SIGNIFICANT CHANGE UP (ref 7–14)
AST SERPL-CCNC: 34 U/L — HIGH (ref 4–32)
BACTERIA UR CULT: SIGNIFICANT CHANGE UP
BASOPHILS # BLD AUTO: 0.01 K/UL — SIGNIFICANT CHANGE UP (ref 0–0.2)
BASOPHILS NFR BLD AUTO: 0.3 % — SIGNIFICANT CHANGE UP (ref 0–2)
BILIRUB SERPL-MCNC: 0.7 MG/DL — SIGNIFICANT CHANGE UP (ref 0.2–1.2)
BUN SERPL-MCNC: 7 MG/DL — SIGNIFICANT CHANGE UP (ref 7–23)
CALCIUM SERPL-MCNC: 8 MG/DL — LOW (ref 8.4–10.5)
CHLORIDE SERPL-SCNC: 98 MMOL/L — SIGNIFICANT CHANGE UP (ref 98–107)
CO2 SERPL-SCNC: 25 MMOL/L — SIGNIFICANT CHANGE UP (ref 22–31)
CREAT SERPL-MCNC: 0.76 MG/DL — SIGNIFICANT CHANGE UP (ref 0.5–1.3)
EOSINOPHIL # BLD AUTO: 0.01 K/UL — SIGNIFICANT CHANGE UP (ref 0–0.5)
EOSINOPHIL NFR BLD AUTO: 0.3 % — SIGNIFICANT CHANGE UP (ref 0–6)
GLUCOSE SERPL-MCNC: 101 MG/DL — HIGH (ref 70–99)
HCT VFR BLD CALC: 28.8 % — LOW (ref 34.5–45)
HGB BLD-MCNC: 9.3 G/DL — LOW (ref 11.5–15.5)
IMM GRANULOCYTES NFR BLD AUTO: 0.7 % — SIGNIFICANT CHANGE UP (ref 0–1.5)
LYMPHOCYTES # BLD AUTO: 1.29 K/UL — SIGNIFICANT CHANGE UP (ref 1–3.3)
LYMPHOCYTES # BLD AUTO: 42.4 % — SIGNIFICANT CHANGE UP (ref 13–44)
MAGNESIUM SERPL-MCNC: 1.3 MG/DL — LOW (ref 1.6–2.6)
MANUAL SMEAR VERIFICATION: SIGNIFICANT CHANGE UP
MCHC RBC-ENTMCNC: 26.6 PG — LOW (ref 27–34)
MCHC RBC-ENTMCNC: 32.3 % — SIGNIFICANT CHANGE UP (ref 32–36)
MCV RBC AUTO: 82.5 FL — SIGNIFICANT CHANGE UP (ref 80–100)
MONOCYTES # BLD AUTO: 0.28 K/UL — SIGNIFICANT CHANGE UP (ref 0–0.9)
MONOCYTES NFR BLD AUTO: 9.2 % — SIGNIFICANT CHANGE UP (ref 2–14)
NEUTROPHILS # BLD AUTO: 1.43 K/UL — LOW (ref 1.8–7.4)
NEUTROPHILS NFR BLD AUTO: 47.1 % — SIGNIFICANT CHANGE UP (ref 43–77)
NRBC # FLD: 0 K/UL — SIGNIFICANT CHANGE UP (ref 0–0)
PHOSPHATE SERPL-MCNC: 3.7 MG/DL — SIGNIFICANT CHANGE UP (ref 2.5–4.5)
PLATELET # BLD AUTO: 86 K/UL — LOW (ref 150–400)
PMV BLD: 10.3 FL — SIGNIFICANT CHANGE UP (ref 7–13)
POTASSIUM SERPL-MCNC: 3.9 MMOL/L — SIGNIFICANT CHANGE UP (ref 3.5–5.3)
POTASSIUM SERPL-SCNC: 3.9 MMOL/L — SIGNIFICANT CHANGE UP (ref 3.5–5.3)
PROT SERPL-MCNC: 5.5 G/DL — LOW (ref 6–8.3)
RBC # BLD: 3.49 M/UL — LOW (ref 3.8–5.2)
RBC # FLD: 14.2 % — SIGNIFICANT CHANGE UP (ref 10.3–14.5)
SODIUM SERPL-SCNC: 135 MMOL/L — SIGNIFICANT CHANGE UP (ref 135–145)
SPECIMEN SOURCE: SIGNIFICANT CHANGE UP
WBC # BLD: 3.04 K/UL — LOW (ref 3.8–10.5)
WBC # FLD AUTO: 3.04 K/UL — LOW (ref 3.8–10.5)

## 2019-10-03 PROCEDURE — 99233 SBSQ HOSP IP/OBS HIGH 50: CPT

## 2019-10-03 RX ORDER — MAGNESIUM SULFATE 500 MG/ML
2 VIAL (ML) INJECTION ONCE
Refills: 0 | Status: COMPLETED | OUTPATIENT
Start: 2019-10-03 | End: 2019-10-03

## 2019-10-03 RX ORDER — VANCOMYCIN HCL 1 G
750 VIAL (EA) INTRAVENOUS ONCE
Refills: 0 | Status: COMPLETED | OUTPATIENT
Start: 2019-10-04 | End: 2019-10-04

## 2019-10-03 RX ADMIN — PIPERACILLIN AND TAZOBACTAM 25 GRAM(S): 4; .5 INJECTION, POWDER, LYOPHILIZED, FOR SOLUTION INTRAVENOUS at 09:36

## 2019-10-03 RX ADMIN — PIPERACILLIN AND TAZOBACTAM 25 GRAM(S): 4; .5 INJECTION, POWDER, LYOPHILIZED, FOR SOLUTION INTRAVENOUS at 00:41

## 2019-10-03 RX ADMIN — PIPERACILLIN AND TAZOBACTAM 25 GRAM(S): 4; .5 INJECTION, POWDER, LYOPHILIZED, FOR SOLUTION INTRAVENOUS at 19:15

## 2019-10-03 RX ADMIN — ONDANSETRON 4 MILLIGRAM(S): 8 TABLET, FILM COATED ORAL at 15:20

## 2019-10-03 RX ADMIN — Medication 250 MILLIGRAM(S): at 15:20

## 2019-10-03 RX ADMIN — ENOXAPARIN SODIUM 40 MILLIGRAM(S): 100 INJECTION SUBCUTANEOUS at 15:25

## 2019-10-03 RX ADMIN — Medication 50 GRAM(S): at 18:09

## 2019-10-03 NOTE — DIETITIAN INITIAL EVALUATION ADULT. - PERTINENT LABORATORY DATA
10-03 Na135 mmol/L Glu 101 mg/dL<H> K+ 3.9 mmol/L Cr  0.76 mg/dL BUN 7 mg/dL 10-03 Phos 3.7 mg/dL 10-03 Alb 2.4 g/dL<L>

## 2019-10-03 NOTE — CHART NOTE - NSCHARTNOTEFT_GEN_A_CORE
NUTRITION SERVICES     Upon Nutritional Assessment by the Registered Dietitian your patient was determined to meet criteria/ has evidence of the following diagnosis/diagnoses:  [ ] Mild Protein Calorie Malnutrition   [ ] Moderate Protein Calorie Malnutrition   [X] Severe Protein Calorie Malnutrition   [ ] Unspecified Protein Calorie Malnutrition   [ ] Underweight / BMI <19  [ ] Morbid Obesity / BMI >40    Findings as based on:  •  Comprehensive nutritional assessment and consultation    Please refer to Initial Dietitian Evaluation via documents section of [employer] for further recommendations.    Prashant Mckeon, BLASN, CDN

## 2019-10-03 NOTE — PROGRESS NOTE ADULT - PROBLEM SELECTOR PLAN 8
Lovenox 40mg daily   -PMD office, Dr. Robles [phone] aware hospitalization. Pt will need outpatient f/u upon discharge

## 2019-10-03 NOTE — PROGRESS NOTE ADULT - PROBLEM SELECTOR PLAN 3
Gemcitabine/Abraxane, started Sept 2019, last dose on 9/26  -Outpt followup with Dr. Tong Epps at [facility]/onc recs noted

## 2019-10-03 NOTE — PHYSICAL THERAPY INITIAL EVALUATION ADULT - PERTINENT HX OF CURRENT PROBLEM, REHAB EVAL
This is a 68y F with PMHx stage IV Pancreatic Adenocarcinoma came with complaints of weakness, abdominal pain, n/v, admitted with  SIRS vs. sepsis criteria, found to have small bowel ileus, and severe protein calorie malnutrition.

## 2019-10-03 NOTE — PROGRESS NOTE ADULT - PROBLEM SELECTOR PLAN 4
Likely induced by recent chemotherapy +/- acute infection. ANC currently >500. No signs of active bleeding on exam  -Trend CBC

## 2019-10-03 NOTE — DIETITIAN INITIAL EVALUATION ADULT. - PHYSICAL APPEARANCE
other (specify)/debilitated Nutrition focused physical exam conducted - found signs of malnutrition [ ]absent [X]present   Subcutaneous fat loss: Mild [X] Orbital fat pads region. Moderate [X]Buccal fat region. Severe [X]Triceps region.  No edema or pressure injuries noted  Muscle wasting: Moderate [X]Temples region, [X]Interosseous region, [X] thigh region, [X]Shoulder region, [X]Scapula region. Severe[X]Clavicle region, [X]Calf region

## 2019-10-03 NOTE — PROGRESS NOTE ADULT - ASSESSMENT
68F w/PMH of Stage IV pancreatic adenocarcinoma c/b prior biliary obstruction undergoing active chemo, COPD, chronic anemia, HTN, PVD s/p RLE stenting and bypass and other co-morbidities presenting from home with complaints of weakness, abdominal pain, n/v, admitted meeting SIRS vs. sepsis criteria, found to cecal colitis and small bowel ileus w/probable enteritis on CT A/P, possibly chemo induced,  pancytopenia, and severe protein calorie malnutrition.

## 2019-10-03 NOTE — DIETITIAN INITIAL EVALUATION ADULT. - OTHER INFO
69 yo F Stage IV pancreatic adenocarcinoma c/b biliary obstruction undergoing active chemo,  COPD, iron deficiency anemia, HTN, PVD s/p RLE stenting and other co-morbidities presenting from home with complaints of weakness, abdominal pain, n/v. CT A/P showing colitis (10/2).  Denies any food allergies or intolerances. No nausea/vomiting/diarrhea/constipation or difficulty chewing and swallowing at this time. Last BM (10/2). Reports improvement in appetite x2 days, consuming 100% of meal this AM (Jello, cream of wheat, apple juice and hard boiled egg). PTA poor appetite/  PO intake due to abdominal discomfort, nausea & vomiting post meal consumption. Patient states UBW of 200lb approximately 6 months ago. PTA, patient typically drinks 3-4 boost shakes per day, avoids excessive intake of sodium, and does not have much of an appetite for solid foods. Educated her on adding caloric dense foods to boost shakes at home to help her achieve her energy needs and promote weight gain. [employer] weight recorded on 4/15/19 176 pounds. Current weight 135.8 pounds/ 61.6kg (as per bed scale), Past admission weight 146.3 pounds / 66.4kg  (8/8/19.)

## 2019-10-03 NOTE — DIETITIAN INITIAL EVALUATION ADULT. - PERTINENT MEDS FT
MEDICATIONS  (STANDING):  enoxaparin Injectable 40 milliGRAM(s) SubCutaneous daily  magnesium sulfate  IVPB 2 Gram(s) IV Intermittent once  piperacillin/tazobactam IVPB.. 3.375 Gram(s) IV Intermittent every 8 hours  vancomycin  IVPB 1000 milliGRAM(s) IV Intermittent every 12 hours  vancomycin  IVPB        MEDICATIONS  (PRN):  morphine  - Injectable 2 milliGRAM(s) IV Push every 4 hours PRN Moderate Pain (4 - 6)  morphine  - Injectable 4 milliGRAM(s) IV Push every 4 hours PRN Severe Pain (7 - 10)  ondansetron Injectable 4 milliGRAM(s) IV Push every 6 hours PRN Nausea

## 2019-10-03 NOTE — PROGRESS NOTE ADULT - SUBJECTIVE AND OBJECTIVE BOX
Adia Robles DO  Division of Hospital Medicine  Pager # [phone]    Patient is a 68y old  Female who presents with a chief complaint of Sepsis (02 Oct 2019 15:53)      INTERVAL HPI/OVERNIGHT EVENTS:    MEDICATIONS (STANDING):  enoxaparin Injectable 40 milliGRAM(s) SubCutaneous daily  magnesium sulfate  IVPB 2 Gram(s) IV Intermittent once  piperacillin/tazobactam IVPB.. 3.375 Gram(s) IV Intermittent every 8 hours  vancomycin  IVPB 1000 milliGRAM(s) IV Intermittent every 12 hours  vancomycin  IVPB        MEDICATIONS  (PRN):  morphine  - Injectable 2 milliGRAM(s) IV Push every 4 hours PRN  morphine  - Injectable 4 milliGRAM(s) IV Push every 4 hours PRN  ondansetron Injectable 4 milliGRAM(s) IV Push every 6 hours PRN      T(F): 97.9 (10-03-19 @ 13:52), Max: 97.9 (10-03-19 @ 13:52)  HR: 73 (10-03-19 @ 13:52) (53 - 85)  BP: 105/68 (10-03-19 @ 13:52) (105/68 - 117/61)  RR: 16 (10-03-19 @ 13:52) (16 - 17)  SpO2: 100% (10-03-19 @ 13:52) (100% - 100%)  Wt(kg): --  CAPILLARY BLOOD GLUCOSE        I&O's Summary    02 Oct 2019 07:01  -  03 Oct 2019 07:00  --------------------------------------------------------  IN: 710 mL / OUT: 0 mL / NET: 710 mL        PHYSICAL EXAM:  GENERAL: NAD, well-groomed, well-developed  HEAD:  Atraumatic, Normocephalic  EYES: EOMI, PERRLA, conjunctiva and sclera clear  ENMT: No tonsillar erythema, exudates, or enlargement; Moist mucous membranes  NECK: Supple, No JVD, Normal thyroid  NERVOUS SYSTEM:  Alert & Oriented X3, Good concentration; Motor Strength 5/5 B/L upper and lower extremities  CHEST/LUNG: Clear to auscultation bilaterally; No rales, rhonchi, wheezing, or rubs  HEART: Regular rate and rhythm; No murmurs, rubs, or gallops  ABDOMEN: Soft, Nontender, Nondistended; Bowel sounds present  EXTREMITIES:  2+ Peripheral Pulses, No clubbing, cyanosis, or edema  LYMPH: No lymphadenopathy noted  SKIN: No rashes or lesions    LABS:                        9.3    3.04  )-----------( 86       ( 03 Oct 2019 06:10 )             28.8     10    135  |  98  |  7   ----------------------------<  101<H>  3.9   |  25  |  0.76    Ca    8.0<L>      03 Oct 2019 06:10  Phos  3.7     10-03  Mg     1.3     10-03    TPro  5.5<L>  /  Alb  2.4<L>  /  TBili  0.7  /  DBili  x   /  AST  34<H>  /  ALT  26  /  AlkPhos  155<H>  10    PT/INR - ( 01 Oct 2019 23:20 )   PT: 13.0 SEC;   INR: 1.14          PTT - ( 01 Oct 2019 23:20 )  PTT:23.1 SEC  Urinalysis Basic - ( 02 Oct 2019 03:30 )    Color: DARK YELLOW / Appearance: Lt TURBID / S.035 / pH: 6.5  Gluc: NEGATIVE / Ketone: TRACE  / Bili: TRACE / Urobili: NORMAL   Blood: NEGATIVE / Protein: 70 / Nitrite: NEGATIVE   Leuk Esterase: LARGE / RBC: 6-10 / WBC >50   Sq Epi: FEW / Non Sq Epi: x / Bacteria: NEGATIVE          RADIOLOGY & ADDITIONAL TESTS:    Consultant notes reviewed [ ]     Plan discussed with Adia Robles DO  Division of Hospital Medicine  Pager # [phone]    Patient is a 68y old  Female who presents with a chief complaint of Sepsis (02 Oct 2019 15:53)      INTERVAL HPI/OVERNIGHT EVENTS: afebrile overnight, pt reports improvement in abdominal pain, able to tolerate meals     MEDICATIONS (STANDING):  enoxaparin Injectable 40 milliGRAM(s) SubCutaneous daily  magnesium sulfate  IVPB 2 Gram(s) IV Intermittent once  piperacillin/tazobactam IVPB.. 3.375 Gram(s) IV Intermittent every 8 hours  vancomycin  IVPB 1000 milliGRAM(s) IV Intermittent every 12 hours  vancomycin  IVPB        MEDICATIONS  (PRN):  morphine  - Injectable 2 milliGRAM(s) IV Push every 4 hours PRN  morphine  - Injectable 4 milliGRAM(s) IV Push every 4 hours PRN  ondansetron Injectable 4 milliGRAM(s) IV Push every 6 hours PRN      T(F): 97.9 (10-03-19 @ 13:52), Max: 97.9 (10-03-19 @ 13:52)  HR: 73 (10-03-19 @ 13:52) (53 - 85)  BP: 105/68 (10-03-19 @ 13:52) (105/68 - 117/61)  RR: 16 (10-03-19 @ 13:52) (16 - 17)  SpO2: 100% (10-03-19 @ 13:52) (100% - 100%)  Wt(kg): --  CAPILLARY BLOOD GLUCOSE        I&O's Summary    02 Oct 2019 07:01  -  03 Oct 2019 07:00  --------------------------------------------------------  IN: 710 mL / OUT: 0 mL / NET: 710 mL        PHYSICAL EXAM:  GENERAL: NAD, frail, elderly   EYES: EOMI, PERRLA, conjunctiva and sclera clear  ENMT: MMM  NERVOUS SYSTEM:  Alert & Oriented X3, nonfocal exam   CHEST/LUNG: Clear to auscultation bilaterally; No rales, rhonchi, wheezing, or rubs  HEART: Regular rate and rhythm; No murmurs, rubs, or gallops  ABDOMEN: Soft, R-sided TTP, Nondistended; Bowel sounds present  EXTREMITIES:  no edema, thin  SKIN: No rashes or lesions    LABS:                        9.3    3.04  )-----------( 86       ( 03 Oct 2019 06:10 )             28.8     10    135  |  98  |  7   ----------------------------<  101<H>  3.9   |  25  |  0.76    Ca    8.0<L>      03 Oct 2019 06:10  Phos  3.7     10-03  Mg     1.3     10-03    TPro  5.5<L>  /  Alb  2.4<L>  /  TBili  0.7  /  DBili  x   /  AST  34<H>  /  ALT  26  /  AlkPhos  155<H>  10    PT/INR - ( 01 Oct 2019 23:20 )   PT: 13.0 SEC;   INR: 1.14          PTT - ( 01 Oct 2019 23:20 )  PTT:23.1 SEC  Urinalysis Basic - ( 02 Oct 2019 03:30 )    Color: DARK YELLOW / Appearance: Lt TURBID / S.035 / pH: 6.5  Gluc: NEGATIVE / Ketone: TRACE  / Bili: TRACE / Urobili: NORMAL   Blood: NEGATIVE / Protein: 70 / Nitrite: NEGATIVE   Leuk Esterase: LARGE / RBC: 6-10 / WBC >50   Sq Epi: FEW / Non Sq Epi: x / Bacteria: NEGATIVE          RADIOLOGY & ADDITIONAL TESTS:    Consultant notes reviewed [x]     Plan discussed with REGINA Solomon and pt.

## 2019-10-03 NOTE — DIETITIAN INITIAL EVALUATION ADULT. - ADD RECOMMEND
1. Provide Ensure Enlive 3 times a day ( provides 240mls 3x daily (1050kcal, 60g protein) ). 2. Monitor weights, labs, BM's, skin integrity, p.o. intake. 3. Please Encourage po intake, assist with meals and menu selections, provide alternatives PRN.

## 2019-10-03 NOTE — PROGRESS NOTE ADULT - PROBLEM SELECTOR PLAN 1
CT w/cecal colitis and small bowel ileus w/probable enteritis. Improving on abx  -C/w Vanc/Zosyn, will deescalate to likely Cipro/flagyl tomorrow if pt clinically continues to improve.   -Blood Cx NGTD  -Can send GI stool PCR, stool cx, however pt reports normal BM today

## 2019-10-03 NOTE — DIETITIAN INITIAL EVALUATION ADULT. - SIGNS/SYMPTOMS
22.7% weight loss in 6 months, Severe muscle wasting, Severe fat loss, < 75% of nutrition needs >1mo

## 2019-10-04 ENCOUNTER — APPOINTMENT (OUTPATIENT)
Dept: HEMATOLOGY ONCOLOGY | Facility: CLINIC | Age: 69
End: 2019-10-04

## 2019-10-04 DIAGNOSIS — N17.9 ACUTE KIDNEY FAILURE, UNSPECIFIED: ICD-10-CM

## 2019-10-04 LAB
ALBUMIN SERPL ELPH-MCNC: 2.2 G/DL — LOW (ref 3.3–5)
ALP SERPL-CCNC: 155 U/L — HIGH (ref 40–120)
ALT FLD-CCNC: 23 U/L — SIGNIFICANT CHANGE UP (ref 4–33)
ANION GAP SERPL CALC-SCNC: 13 MMO/L — SIGNIFICANT CHANGE UP (ref 7–14)
ANION GAP SERPL CALC-SCNC: 14 MMO/L — SIGNIFICANT CHANGE UP (ref 7–14)
AST SERPL-CCNC: 30 U/L — SIGNIFICANT CHANGE UP (ref 4–32)
BILIRUB SERPL-MCNC: 0.7 MG/DL — SIGNIFICANT CHANGE UP (ref 0.2–1.2)
BUN SERPL-MCNC: 11 MG/DL — SIGNIFICANT CHANGE UP (ref 7–23)
BUN SERPL-MCNC: 9 MG/DL — SIGNIFICANT CHANGE UP (ref 7–23)
CALCIUM SERPL-MCNC: 7.9 MG/DL — LOW (ref 8.4–10.5)
CALCIUM SERPL-MCNC: 8.1 MG/DL — LOW (ref 8.4–10.5)
CHLORIDE SERPL-SCNC: 97 MMOL/L — LOW (ref 98–107)
CHLORIDE SERPL-SCNC: 98 MMOL/L — SIGNIFICANT CHANGE UP (ref 98–107)
CO2 SERPL-SCNC: 23 MMOL/L — SIGNIFICANT CHANGE UP (ref 22–31)
CO2 SERPL-SCNC: 24 MMOL/L — SIGNIFICANT CHANGE UP (ref 22–31)
CREAT SERPL-MCNC: 1.49 MG/DL — HIGH (ref 0.5–1.3)
CREAT SERPL-MCNC: 2.3 MG/DL — HIGH (ref 0.5–1.3)
GLUCOSE SERPL-MCNC: 104 MG/DL — HIGH (ref 70–99)
GLUCOSE SERPL-MCNC: 97 MG/DL — SIGNIFICANT CHANGE UP (ref 70–99)
HCT VFR BLD CALC: 29.7 % — LOW (ref 34.5–45)
HGB BLD-MCNC: 9.7 G/DL — LOW (ref 11.5–15.5)
MAGNESIUM SERPL-MCNC: 1.7 MG/DL — SIGNIFICANT CHANGE UP (ref 1.6–2.6)
MCHC RBC-ENTMCNC: 26.6 PG — LOW (ref 27–34)
MCHC RBC-ENTMCNC: 32.7 % — SIGNIFICANT CHANGE UP (ref 32–36)
MCV RBC AUTO: 81.6 FL — SIGNIFICANT CHANGE UP (ref 80–100)
NRBC # FLD: 0 K/UL — SIGNIFICANT CHANGE UP (ref 0–0)
PHOSPHATE SERPL-MCNC: 3.2 MG/DL — SIGNIFICANT CHANGE UP (ref 2.5–4.5)
PLATELET # BLD AUTO: 103 K/UL — LOW (ref 150–400)
PMV BLD: 10.1 FL — SIGNIFICANT CHANGE UP (ref 7–13)
POTASSIUM SERPL-MCNC: 3.2 MMOL/L — LOW (ref 3.5–5.3)
POTASSIUM SERPL-MCNC: 3.3 MMOL/L — LOW (ref 3.5–5.3)
POTASSIUM SERPL-SCNC: 3.2 MMOL/L — LOW (ref 3.5–5.3)
POTASSIUM SERPL-SCNC: 3.3 MMOL/L — LOW (ref 3.5–5.3)
PROT SERPL-MCNC: 5.2 G/DL — LOW (ref 6–8.3)
RBC # BLD: 3.64 M/UL — LOW (ref 3.8–5.2)
RBC # FLD: 14.1 % — SIGNIFICANT CHANGE UP (ref 10.3–14.5)
SODIUM SERPL-SCNC: 134 MMOL/L — LOW (ref 135–145)
SODIUM SERPL-SCNC: 135 MMOL/L — SIGNIFICANT CHANGE UP (ref 135–145)
VANCOMYCIN FLD-MCNC: 22.2 UG/ML — SIGNIFICANT CHANGE UP
WBC # BLD: 3.88 K/UL — SIGNIFICANT CHANGE UP (ref 3.8–10.5)
WBC # FLD AUTO: 3.88 K/UL — SIGNIFICANT CHANGE UP (ref 3.8–10.5)

## 2019-10-04 PROCEDURE — 99233 SBSQ HOSP IP/OBS HIGH 50: CPT

## 2019-10-04 PROCEDURE — 99232 SBSQ HOSP IP/OBS MODERATE 35: CPT | Mod: GC

## 2019-10-04 RX ORDER — POTASSIUM CHLORIDE 20 MEQ
40 PACKET (EA) ORAL ONCE
Refills: 0 | Status: COMPLETED | OUTPATIENT
Start: 2019-10-04 | End: 2019-10-04

## 2019-10-04 RX ORDER — METOCLOPRAMIDE HCL 10 MG
10 TABLET ORAL ONCE
Refills: 0 | Status: COMPLETED | OUTPATIENT
Start: 2019-10-04 | End: 2019-10-04

## 2019-10-04 RX ORDER — PIPERACILLIN AND TAZOBACTAM 4; .5 G/20ML; G/20ML
2.25 INJECTION, POWDER, LYOPHILIZED, FOR SOLUTION INTRAVENOUS EVERY 6 HOURS
Refills: 0 | Status: DISCONTINUED | OUTPATIENT
Start: 2019-10-04 | End: 2019-10-04

## 2019-10-04 RX ORDER — PIPERACILLIN AND TAZOBACTAM 4; .5 G/20ML; G/20ML
3.38 INJECTION, POWDER, LYOPHILIZED, FOR SOLUTION INTRAVENOUS EVERY 12 HOURS
Refills: 0 | Status: DISCONTINUED | OUTPATIENT
Start: 2019-10-04 | End: 2019-10-06

## 2019-10-04 RX ORDER — SODIUM CHLORIDE 9 MG/ML
1000 INJECTION INTRAMUSCULAR; INTRAVENOUS; SUBCUTANEOUS
Refills: 0 | Status: DISCONTINUED | OUTPATIENT
Start: 2019-10-04 | End: 2019-10-05

## 2019-10-04 RX ORDER — POTASSIUM CHLORIDE 20 MEQ
40 PACKET (EA) ORAL ONCE
Refills: 0 | Status: COMPLETED | OUTPATIENT
Start: 2019-10-04 | End: 2019-10-05

## 2019-10-04 RX ADMIN — ONDANSETRON 4 MILLIGRAM(S): 8 TABLET, FILM COATED ORAL at 04:35

## 2019-10-04 RX ADMIN — ONDANSETRON 4 MILLIGRAM(S): 8 TABLET, FILM COATED ORAL at 11:40

## 2019-10-04 RX ADMIN — SODIUM CHLORIDE 75 MILLILITER(S): 9 INJECTION INTRAMUSCULAR; INTRAVENOUS; SUBCUTANEOUS at 16:28

## 2019-10-04 RX ADMIN — PIPERACILLIN AND TAZOBACTAM 25 GRAM(S): 4; .5 INJECTION, POWDER, LYOPHILIZED, FOR SOLUTION INTRAVENOUS at 23:26

## 2019-10-04 RX ADMIN — PIPERACILLIN AND TAZOBACTAM 25 GRAM(S): 4; .5 INJECTION, POWDER, LYOPHILIZED, FOR SOLUTION INTRAVENOUS at 11:54

## 2019-10-04 RX ADMIN — Medication 250 MILLIGRAM(S): at 03:03

## 2019-10-04 RX ADMIN — PIPERACILLIN AND TAZOBACTAM 25 GRAM(S): 4; .5 INJECTION, POWDER, LYOPHILIZED, FOR SOLUTION INTRAVENOUS at 04:07

## 2019-10-04 RX ADMIN — SODIUM CHLORIDE 75 MILLILITER(S): 9 INJECTION INTRAMUSCULAR; INTRAVENOUS; SUBCUTANEOUS at 15:21

## 2019-10-04 NOTE — PROGRESS NOTE ADULT - PROBLEM SELECTOR PLAN 1
Pt with jump in Cr today 1.46 from 0.76, possibly from Vanc toxicity. Pt received 3 doses of Vanc 1g, was due for trough overnight, however pt refused and was dosed 750mg instead per nursing.   -Vanc level ordered  -Repeat BMP pending  -Ulytes pending  -Bladder scan to r/o obstruction  -IVF NS 75cc/hr  -If Cr continues to uptrend, will order Renal US and consult nephrology.   -Avoid nephrotoxic agents

## 2019-10-04 NOTE — PROGRESS NOTE ADULT - PROBLEM SELECTOR PLAN 4
Gemcitabine/Abraxane, started Sept 2019, last dose on 9/26  -Outpt followup with Dr. Tong Epps at [facility]/onc recs noted

## 2019-10-04 NOTE — PROGRESS NOTE ADULT - PROBLEM SELECTOR PLAN 9
Lovenox 40mg daily   -PMD office, Dr. Robles [phone] aware hospitalization. Pt will need outpatient f/u upon discharge

## 2019-10-04 NOTE — PROGRESS NOTE ADULT - SUBJECTIVE AND OBJECTIVE BOX
Adia Robles DO  Division of Hospital Medicine  Pager # [phone]    Patient is a 68y old  Female who presents with a chief complaint of Sepsis (03 Oct 2019 14:47)      INTERVAL HPI/OVERNIGHT EVENTS:    MEDICATIONS (STANDING):  enoxaparin Injectable 40 milliGRAM(s) SubCutaneous daily  piperacillin/tazobactam IVPB.. 3.375 Gram(s) IV Intermittent every 8 hours  potassium chloride    Tablet ER 40 milliEquivalent(s) Oral once    MEDICATIONS  (PRN):  morphine  - Injectable 2 milliGRAM(s) IV Push every 4 hours PRN  morphine  - Injectable 4 milliGRAM(s) IV Push every 4 hours PRN  ondansetron Injectable 4 milliGRAM(s) IV Push every 6 hours PRN        T(F): 98 (10-04-19 @ 08:59), Max: 98.2 (10-03-19 @ 21:33)  HR: 77 (10-04-19 @ 08:59) (69 - 77)  BP: 138/85 (10-04-19 @ 08:59) (105/68 - 138/85)  RR: 16 (10-04-19 @ 08:59) (16 - 17)  SpO2: 99% (10-04-19 @ 08:59) (99% - 100%)  Wt(kg): --  CAPILLARY BLOOD GLUCOSE        I&O's Summary      PHYSICAL EXAM:  GENERAL: NAD, well-groomed, well-developed  HEAD:  Atraumatic, Normocephalic  EYES: EOMI, PERRLA, conjunctiva and sclera clear  ENMT: No tonsillar erythema, exudates, or enlargement; Moist mucous membranes  NECK: Supple, No JVD, Normal thyroid  NERVOUS SYSTEM:  Alert & Oriented X3, Good concentration; Motor Strength 5/5 B/L upper and lower extremities  CHEST/LUNG: Clear to auscultation bilaterally; No rales, rhonchi, wheezing, or rubs  HEART: Regular rate and rhythm; No murmurs, rubs, or gallops  ABDOMEN: Soft, Nontender, Nondistended; Bowel sounds present  EXTREMITIES:  2+ Peripheral Pulses, No clubbing, cyanosis, or edema  LYMPH: No lymphadenopathy noted  SKIN: No rashes or lesions    LABS:                        9.7    3.88  )-----------( 103      ( 04 Oct 2019 05:40 )             29.7     10-04    134<L>  |  97<L>  |  9   ----------------------------<  104<H>  3.2<L>   |  23  |  1.49<H>    Ca    7.9<L>      04 Oct 2019 05:40  Phos  3.2     10-04  Mg     1.7     10-04    TPro  5.2<L>  /  Alb  2.2<L>  /  TBili  0.7  /  DBili  x   /  AST  30  /  ALT  23  /  AlkPhos  155<H>  10-04            RADIOLOGY & ADDITIONAL TESTS:    Consultant notes reviewed [ ]     Plan discussed with Adia Robles DO  Division of Hospital Medicine  Pager # [phone]    Patient is a 68y old  Female who presents with a chief complaint of Sepsis (03 Oct 2019 14:47)      INTERVAL HPI/OVERNIGHT EVENTS: Pt reports persistent nausea after eating sweet potato last night, no vomiting. Last BM was normal yesterday. Denies worsening abdominal pain or distention, passing gas.     MEDICATIONS (STANDING):  enoxaparin Injectable 40 milliGRAM(s) SubCutaneous daily  piperacillin/tazobactam IVPB.. 3.375 Gram(s) IV Intermittent every 8 hours  potassium chloride    Tablet ER 40 milliEquivalent(s) Oral once    MEDICATIONS  (PRN):  morphine  - Injectable 2 milliGRAM(s) IV Push every 4 hours PRN  morphine  - Injectable 4 milliGRAM(s) IV Push every 4 hours PRN  ondansetron Injectable 4 milliGRAM(s) IV Push every 6 hours PRN        T(F): 98 (10-04-19 @ 08:59), Max: 98.2 (10-03-19 @ 21:33)  HR: 77 (10-04-19 @ 08:59) (69 - 77)  BP: 138/85 (10-04-19 @ 08:59) (105/68 - 138/85)  RR: 16 (10-04-19 @ 08:59) (16 - 17)  SpO2: 99% (10-04-19 @ 08:59) (99% - 100%)  Wt(kg): --  CAPILLARY BLOOD GLUCOSE        I&O's Summary      PHYSICAL EXAM:  GENERAL: NAD, frail   EYES: EOMI, conjunctiva and sclera clear  ENMT: MMM  NERVOUS SYSTEM:  Alert & Oriented X3, nonfocal exam   CHEST/LUNG: CTAB, no wheezing   HEART: Regular rate and rhythm; No murmurs, rubs, or gallops  ABDOMEN: Soft, R-sided TTP, Nondistended; Bowel sounds present  EXTREMITIES:  no edema, thin    LABS:                        9.7    3.88  )-----------( 103      ( 04 Oct 2019 05:40 )             29.7     10-04    134<L>  |  97<L>  |  9   ----------------------------<  104<H>  3.2<L>   |  23  |  1.49<H>    Ca    7.9<L>      04 Oct 2019 05:40  Phos  3.2     10-04  Mg     1.7     10-04    TPro  5.2<L>  /  Alb  2.2<L>  /  TBili  0.7  /  DBili  x   /  AST  30  /  ALT  23  /  AlkPhos  155<H>  10-04            RADIOLOGY & ADDITIONAL TESTS:    Consultant notes reviewed [x]     Plan discussed with REGINA Perez and pt.

## 2019-10-04 NOTE — PROGRESS NOTE ADULT - PROBLEM SELECTOR PLAN 2
CT w/cecal colitis and small bowel ileus w/probable enteritis. Improving on abx  -C/w Vanc/Zosyn, will deescalate to likely Cipro/flagyl tomorrow if pt clinically continues to improve.   -Blood Cx NGTD  -Can send GI stool PCR, stool cx, however pt reports normal BM today CT w/cecal colitis and small bowel ileus w/probable enteritis, likely due to recent chemotherapy. no neutropenia   -s/p Vanc/Zosyn   -Deescalate to Ceftriaxone 1g daily and Flagyl 500mg q8 for total of 7 days   -Abx dcd on 10/4 as pt clinically improving and cultures negative so far. Colitis likely 2/2 chemotherapy   -Blood Cx NGTD x48 hours  -Can send GI stool PCR, stool cx if pt has diarrhea, pt reports resolution since admission CT w/cecal colitis and small bowel ileus w/probable enteritis, likely due to recent chemotherapy. no neutropenia   -C/w Zosyn 2.25 q6 as CrCl ~37  -Hold Vancomycin, will need to dose by level which is pending   -Blood Cx NGTD x48 hours  -Can send GI stool PCR, stool cx if pt has diarrhea, pt reports resolution since admission

## 2019-10-04 NOTE — PROGRESS NOTE ADULT - PROBLEM SELECTOR PLAN 6
Improved  -Tolerating PO and had BM this morning Improved  -Serial abdominal exams  -Monitor for BMs

## 2019-10-04 NOTE — PROGRESS NOTE ADULT - PROBLEM SELECTOR PLAN 5
Likely induced by recent chemotherapy +/- acute infection. ANC currently >500. No signs of active bleeding on exam  -Trend CBC Likely induced by recent chemotherapy +/- acute infection. ANC currently >500. No signs of active bleeding on exam  -Improving, trend cbc

## 2019-10-04 NOTE — H&P ADULT - DOES THIS PATIENT HAVE A HISTORY OF OR HAS BEEN DX WITH HEART FAILURE?
-- Message is from the [facility]--    Patient is requesting a medication refill - medication is on active medication list    Patient is currently OUT of the requested medication.    Was Medication Pended?  Yes.    Rx Name and Dose:  bisoprolol-hydrochlorothiazide (ZIAC) 5-6.25 MG per tablet    Duration: 90 days    Pharmacy  [id number] - [address] [facility]    Patient confirmed the above pharmacy as correct?  Yes    Caller Information       Type Contact Phone    10/04/2019 01:59 PM Phone (Incoming) Elizabeth Maldonado (Self) [phone] (H)          Alternative phone number: none given    Turnaround time given to caller:   \"This message will be sent to [state Provider's name]. The clinical team will fulfill your request as soon as they review your message.\"   no

## 2019-10-05 LAB
ALBUMIN SERPL ELPH-MCNC: 2.3 G/DL — LOW (ref 3.3–5)
ALP SERPL-CCNC: 168 U/L — HIGH (ref 40–120)
ALT FLD-CCNC: 20 U/L — SIGNIFICANT CHANGE UP (ref 4–33)
ANION GAP SERPL CALC-SCNC: 12 MMO/L — SIGNIFICANT CHANGE UP (ref 7–14)
APPEARANCE UR: SIGNIFICANT CHANGE UP
AST SERPL-CCNC: 26 U/L — SIGNIFICANT CHANGE UP (ref 4–32)
BACTERIA # UR AUTO: NEGATIVE — SIGNIFICANT CHANGE UP
BASOPHILS # BLD AUTO: 0.01 K/UL — SIGNIFICANT CHANGE UP (ref 0–0.2)
BASOPHILS NFR BLD AUTO: 0.2 % — SIGNIFICANT CHANGE UP (ref 0–2)
BILIRUB SERPL-MCNC: 0.7 MG/DL — SIGNIFICANT CHANGE UP (ref 0.2–1.2)
BILIRUB UR-MCNC: NEGATIVE — SIGNIFICANT CHANGE UP
BLOOD UR QL VISUAL: NEGATIVE — SIGNIFICANT CHANGE UP
BUN SERPL-MCNC: 12 MG/DL — SIGNIFICANT CHANGE UP (ref 7–23)
CALCIUM SERPL-MCNC: 8 MG/DL — LOW (ref 8.4–10.5)
CHLORIDE SERPL-SCNC: 103 MMOL/L — SIGNIFICANT CHANGE UP (ref 98–107)
CO2 SERPL-SCNC: 23 MMOL/L — SIGNIFICANT CHANGE UP (ref 22–31)
COLOR SPEC: SIGNIFICANT CHANGE UP
CREAT ?TM UR-MCNC: 43.4 MG/DL — SIGNIFICANT CHANGE UP
CREAT SERPL-MCNC: 2.82 MG/DL — HIGH (ref 0.5–1.3)
EOSINOPHIL # BLD AUTO: 0.01 K/UL — SIGNIFICANT CHANGE UP (ref 0–0.5)
EOSINOPHIL NFR BLD AUTO: 0.2 % — SIGNIFICANT CHANGE UP (ref 0–6)
GLUCOSE SERPL-MCNC: 92 MG/DL — SIGNIFICANT CHANGE UP (ref 70–99)
GLUCOSE UR-MCNC: NEGATIVE — SIGNIFICANT CHANGE UP
HCT VFR BLD CALC: 30 % — LOW (ref 34.5–45)
HGB BLD-MCNC: 10.1 G/DL — LOW (ref 11.5–15.5)
HYALINE CASTS # UR AUTO: NEGATIVE — SIGNIFICANT CHANGE UP
IMM GRANULOCYTES NFR BLD AUTO: 0.9 % — SIGNIFICANT CHANGE UP (ref 0–1.5)
KETONES UR-MCNC: NEGATIVE — SIGNIFICANT CHANGE UP
LEUKOCYTE ESTERASE UR-ACNC: SIGNIFICANT CHANGE UP
LYMPHOCYTES # BLD AUTO: 1.59 K/UL — SIGNIFICANT CHANGE UP (ref 1–3.3)
LYMPHOCYTES # BLD AUTO: 35.2 % — SIGNIFICANT CHANGE UP (ref 13–44)
MAGNESIUM SERPL-MCNC: 1.8 MG/DL — SIGNIFICANT CHANGE UP (ref 1.6–2.6)
MCHC RBC-ENTMCNC: 27.5 PG — SIGNIFICANT CHANGE UP (ref 27–34)
MCHC RBC-ENTMCNC: 33.7 % — SIGNIFICANT CHANGE UP (ref 32–36)
MCV RBC AUTO: 81.7 FL — SIGNIFICANT CHANGE UP (ref 80–100)
MONOCYTES # BLD AUTO: 0.48 K/UL — SIGNIFICANT CHANGE UP (ref 0–0.9)
MONOCYTES NFR BLD AUTO: 10.6 % — SIGNIFICANT CHANGE UP (ref 2–14)
NEUTROPHILS # BLD AUTO: 2.39 K/UL — SIGNIFICANT CHANGE UP (ref 1.8–7.4)
NEUTROPHILS NFR BLD AUTO: 52.9 % — SIGNIFICANT CHANGE UP (ref 43–77)
NITRITE UR-MCNC: NEGATIVE — SIGNIFICANT CHANGE UP
NRBC # FLD: 0 K/UL — SIGNIFICANT CHANGE UP (ref 0–0)
OSMOLALITY UR: 122 MOSMO/KG — SIGNIFICANT CHANGE UP (ref 50–1200)
PH UR: 6.5 — SIGNIFICANT CHANGE UP (ref 5–8)
PHOSPHATE SERPL-MCNC: 3.7 MG/DL — SIGNIFICANT CHANGE UP (ref 2.5–4.5)
PLATELET # BLD AUTO: 186 K/UL — SIGNIFICANT CHANGE UP (ref 150–400)
PMV BLD: 11.2 FL — SIGNIFICANT CHANGE UP (ref 7–13)
POTASSIUM SERPL-MCNC: 4.1 MMOL/L — SIGNIFICANT CHANGE UP (ref 3.5–5.3)
POTASSIUM SERPL-SCNC: 4.1 MMOL/L — SIGNIFICANT CHANGE UP (ref 3.5–5.3)
PROT SERPL-MCNC: 5.3 G/DL — LOW (ref 6–8.3)
PROT UR-MCNC: 50 — SIGNIFICANT CHANGE UP
RBC # BLD: 3.67 M/UL — LOW (ref 3.8–5.2)
RBC # FLD: 14.4 % — SIGNIFICANT CHANGE UP (ref 10.3–14.5)
RBC CASTS # UR COMP ASSIST: HIGH (ref 0–?)
SODIUM SERPL-SCNC: 138 MMOL/L — SIGNIFICANT CHANGE UP (ref 135–145)
SODIUM UR-SCNC: 34 MMOL/L — SIGNIFICANT CHANGE UP
SP GR SPEC: 1.01 — SIGNIFICANT CHANGE UP (ref 1–1.04)
SQUAMOUS # UR AUTO: SIGNIFICANT CHANGE UP
UROBILINOGEN FLD QL: NORMAL — SIGNIFICANT CHANGE UP
VANCOMYCIN FLD-MCNC: 20 UG/ML — SIGNIFICANT CHANGE UP
WBC # BLD: 4.52 K/UL — SIGNIFICANT CHANGE UP (ref 3.8–10.5)
WBC # FLD AUTO: 4.52 K/UL — SIGNIFICANT CHANGE UP (ref 3.8–10.5)
WBC UR QL: HIGH (ref 0–?)

## 2019-10-05 PROCEDURE — 99233 SBSQ HOSP IP/OBS HIGH 50: CPT

## 2019-10-05 PROCEDURE — 76770 US EXAM ABDO BACK WALL COMP: CPT | Mod: 26

## 2019-10-05 PROCEDURE — 99223 1ST HOSP IP/OBS HIGH 75: CPT | Mod: GC

## 2019-10-05 RX ORDER — SODIUM CHLORIDE 9 MG/ML
1000 INJECTION INTRAMUSCULAR; INTRAVENOUS; SUBCUTANEOUS
Refills: 0 | Status: DISCONTINUED | OUTPATIENT
Start: 2019-10-05 | End: 2019-10-07

## 2019-10-05 RX ADMIN — ENOXAPARIN SODIUM 40 MILLIGRAM(S): 100 INJECTION SUBCUTANEOUS at 12:25

## 2019-10-05 RX ADMIN — MORPHINE SULFATE 2 MILLIGRAM(S): 50 CAPSULE, EXTENDED RELEASE ORAL at 11:10

## 2019-10-05 RX ADMIN — SODIUM CHLORIDE 100 MILLILITER(S): 9 INJECTION INTRAMUSCULAR; INTRAVENOUS; SUBCUTANEOUS at 12:27

## 2019-10-05 RX ADMIN — PIPERACILLIN AND TAZOBACTAM 25 GRAM(S): 4; .5 INJECTION, POWDER, LYOPHILIZED, FOR SOLUTION INTRAVENOUS at 12:26

## 2019-10-05 RX ADMIN — Medication 40 MILLIEQUIVALENT(S): at 00:12

## 2019-10-05 RX ADMIN — MORPHINE SULFATE 2 MILLIGRAM(S): 50 CAPSULE, EXTENDED RELEASE ORAL at 10:36

## 2019-10-05 NOTE — PROGRESS NOTE ADULT - PROBLEM SELECTOR PLAN 3
Plan as above Gemcitabine/Abraxane, started Sept 2019, last dose on 9/25  -Outpt followup with Dr. Tong Epps at [facility]/onc recs noted

## 2019-10-05 NOTE — PROGRESS NOTE ADULT - PROBLEM SELECTOR PLAN 4
Gemcitabine/Abraxane, started Sept 2019, last dose on 9/26  -Outpt followup with Dr. Tong Epps at [facility]/onc recs noted in setting of recent chemo, now with normal wbc and plt. continues to have anemia and will trend h/h

## 2019-10-05 NOTE — PROGRESS NOTE ADULT - SUBJECTIVE AND OBJECTIVE BOX
Pulmonary Outpatient Consultation Note   Jonh Whitman 53 y o  male MRN: [id number]  8/30/2018    Assessment/Plan:    Mild intermittent asthma without complication   The description of his episodes along with PFT results would suggest that he has mild intermittent asthma  His symptoms are not significant enough or for occurring frequently enough to warrant daily inhaler therapy  I have prescribed an albuterol inhaler to use on an as-needed basis  He has been very good about avoiding the environments that seem to provoke his symptoms  He will undergo blood work to evaluate for an allergic component  I will call him with those results  Visit orders:    Diagnoses and all orders for this visit:    Mild intermittent asthma without complication  -     albuterol (PROAIR HFA) 90 mcg/act inhaler; Inhale 2 puffs every 6 (six) hours as needed for shortness of breath  -     [facility] Allergy Panel, Adult; Future  -     IgE; Future    SOB (shortness of breath) on exertion  -     Ambulatory referral to Pulmonology        History of Present Illness   HPI:  Jonh Whitman is a 53 y o  male who is here today for evaluation regarding shortness of breath  He has a remote history of asthma in childhood  He "grew out" of it and has not used inhalers in many years  He has a longstanding history of cardiac disease and follows closely with Dr Danisha Box  He developed episodes of extreme shortness of breath when exposed to dust, chemicals and humid weather  He also notes symptoms when under significant stress and sometimes when exercising  He does not have any issues in the cold weather  He has 2 cats at home but does not believe he is allergic to them  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  They have a coal burning stove at home  Review of Systems   Constitutional: Positive for unexpected weight change (  9 lb weight loss)  Negative for chills and fever  HENT: Negative for postnasal drip and sore throat  Eyes: Negative for visual disturbance  Respiratory:        As noted in HPI   Cardiovascular: Negative for chest pain  Gastrointestinal: Negative for abdominal pain, diarrhea and vomiting  Genitourinary: Negative for difficulty urinating  Skin: Negative for rash  Neurological: Negative for headaches  Hematological: Negative for adenopathy  Psychiatric/Behavioral: Negative  All other systems reviewed and are negative        Historical Information   Past Medical History:   Diagnosis Date    Bradycardia     Brugada syndrome     Cardiac disease     Hyperlipidemia     Hypertension     ICD (implantable cardioverter-defibrillator) in place     MI (myocardial infarction) ([facility] [address] )     per pt report    Pacemaker      Past Surgical History:   Procedure Laterality Date    CARDIAC CATHETERIZATION      CARDIAC DEFIBRILLATOR PLACEMENT      CARDIAC PACEMAKER PLACEMENT      LUMBAR FUSION      NO PAST SURGERIES       Family History   Problem Relation Age of Onset    Heart disease Mother          History   Smoking Status    Former Smoker    Packs/day: 1 00    Years: 15 00    Types: Cigarettes    Quit date: 1/19/2006   Smokeless Tobacco    Never Used       Meds/Allergies     Current Outpatient Prescriptions:     atorvastatin (LIPITOR) 40 mg tablet, Take 1 tablet (40 mg total) by mouth daily, Disp: 90 tablet, Rfl: 3    clopidogrel (PLAVIX) 75 mg tablet, Take 1 tablet (75 mg total) by mouth daily, Disp: 90 tablet, Rfl: 3    furosemide (LASIX) 20 mg tablet, Take 1 tablet (20 mg total) by mouth daily, Disp: 90 tablet, Rfl: 3    isosorbide mononitrate (IMDUR) 60 mg 24 hr tablet, Take 1 tablet (60 mg total) by mouth daily, Disp: 90 tablet, Rfl: 3    lisinopril (ZESTRIL) 5 mg tablet, Take 1 tablet (5 mg total) by mouth daily, Disp: 90 tablet, Rfl: 3    metoprolol succinate (TOPROL-XL) 25 mg 24 hr tablet, Take 1 tablet (25 mg total) by mouth 2 (two) times a day, Disp: 90 tablet, Rfl: 3    nitroglycerin Patient is a 68y old  Female who presents with a chief complaint of Sepsis (05 Oct 2019 16:08)        SUBJECTIVE / OVERNIGHT EVENTS:      MEDICATIONS  (STANDING):  enoxaparin Injectable 40 milliGRAM(s) SubCutaneous daily  piperacillin/tazobactam IVPB.. 3.375 Gram(s) IV Intermittent every 12 hours  sodium chloride 0.9%. 1000 milliLiter(s) (100 mL/Hr) IV Continuous <Continuous>    MEDICATIONS  (PRN):  morphine  - Injectable 2 milliGRAM(s) IV Push every 4 hours PRN Moderate Pain (4 - 6)  morphine  - Injectable 4 milliGRAM(s) IV Push every 4 hours PRN Severe Pain (7 - 10)  ondansetron Injectable 4 milliGRAM(s) IV Push every 6 hours PRN Nausea      Vital Signs Last 24 Hrs  T(C): 36.5 (05 Oct 2019 14:47), Max: 36.9 (05 Oct 2019 05:06)  T(F): 97.7 (05 Oct 2019 14:47), Max: 98.4 (05 Oct 2019 05:06)  HR: 89 (05 Oct 2019 14:47) (76 - 89)  BP: 108/61 (05 Oct 2019 14:47) (108/61 - 149/96)  BP(mean): --  RR: 18 (05 Oct 2019 14:47) (16 - 18)  SpO2: 99% (05 Oct 2019 14:47) (98% - 99%)  CAPILLARY BLOOD GLUCOSE        I&O's Summary    04 Oct 2019 07:  -  05 Oct 2019 07:00  --------------------------------------------------------  IN: 546 mL / OUT: 350 mL / NET: 196 mL    05 Oct 2019 07:  -  05 Oct 2019 18:51  --------------------------------------------------------  IN: 1300 mL / OUT: 0 mL / NET: 1300 mL          PHYSICAL EXAM  GENERAL: NAD, well-developed  HEAD:  Atraumatic, Normocephalic  EYES: EOMI, PERRLA, conjunctiva and sclera clear  NECK: Supple, No JVD  CHEST/LUNG: Clear to auscultation bilaterally; No wheeze  HEART: Regular rate and rhythm; No murmurs, rubs, or gallops  ABDOMEN: Soft, Nontender, Nondistended; Bowel sounds present  EXTREMITIES:  2+ Peripheral Pulses, No clubbing, cyanosis, or edema  PSYCH: AAOx3  SKIN: No rashes or lesions    LABS:                        10.1   4.52  )-----------( 186      ( 05 Oct 2019 06:26 )             30.0     10-05    138  |  103  |  12  ----------------------------<  92  4.1   |  23  |  2.82<H>    Ca    8.0<L>      05 Oct 2019 06:26  Phos  3.7     10-05  Mg     1.8     10-05    TPro  5.3<L>  /  Alb  2.3<L>  /  TBili  0.7  /  DBili  x   /  AST  26  /  ALT  20  /  AlkPhos  168<H>  10-05          Urinalysis Basic - ( 05 Oct 2019 00:00 )    Color: LIGHT YELLOW / Appearance: Lt TURBID / S.009 / pH: 6.5  Gluc: NEGATIVE / Ketone: NEGATIVE  / Bili: NEGATIVE / Urobili: NORMAL   Blood: NEGATIVE / Protein: 50 / Nitrite: NEGATIVE   Leuk Esterase: TRACE / RBC: 11-25 / WBC 11-25   Sq Epi: FEW / Non Sq Epi: x / Bacteria: NEGATIVE        RADIOLOGY & ADDITIONAL TESTS:    Imaging Personally Reviewed:  Consultant(s) Notes Reviewed:    Care Discussed with Consultants/Other Providers: (NITROSTAT) 0 4 mg SL tablet, Place 1 tablet (0 4 mg total) under the tongue every 5 (five) minutes as needed for chest pain, Disp: 30 tablet, Rfl: 0    albuterol (PROAIR HFA) 90 mcg/act inhaler, Inhale 2 puffs every 6 (six) hours as needed for shortness of breath, Disp: 1 Inhaler, Rfl: 5    aspirin 81 mg chewable tablet, Chew 1 tablet (81 mg total) 2 (two) times a day for 30 days, Disp: 90 tablet, Rfl: 3  No Known Allergies    Vitals: Blood pressure 110/76, pulse 66, temperature 97 5 °F (36 4 °C), temperature source Tympanic, height 6' 1" (1 854 m), weight 97 6 kg (215 lb 3 2 oz), SpO2 96 %  , Body mass index is 28 39 kg/m²  Oxygen Therapy  SpO2: 96 %  Oxygen Therapy: None (Room air)    Physical Exam   Physical Exam   Constitutional: He is oriented to person, place, and time  No distress  HENT:   Head: Normocephalic  Mouth/Throat: No oropharyngeal exudate  Eyes: Pupils are equal, round, and reactive to light  No scleral icterus  Neck: Neck supple  No JVD present  Cardiovascular: Normal rate and regular rhythm  Pulmonary/Chest: He has no wheezes  He has no rales  Abdominal: Soft  There is no tenderness  Musculoskeletal: He exhibits no edema  Lymphadenopathy:     He has no cervical adenopathy  Neurological: He is alert and oriented to person, place, and time  Skin: Skin is warm and dry  Psychiatric: He has a normal mood and affect  Labs: I have personally reviewed pertinent lab results    Lab Results   Component Value Date    WBC 7 44 05/19/2018    HGB 14 6 05/19/2018    HCT 40 7 05/19/2018    MCV 84 05/19/2018     05/19/2018     Lab Results   Component Value Date    GLUCOSE 112 (H) 05/23/2017    CALCIUM 9 2 05/20/2018     05/20/2018    K 4 2 05/20/2018    CO2 24 05/20/2018     05/20/2018    BUN 15 05/20/2018    CREATININE 0 91 05/20/2018     No results found for: IGE  Lab Results   Component Value Date    ALT 51 05/19/2018    AST 31 05/19/2018    ALKPHOS 79 05/19/2018    BILITOT 0 7 05/23/2017       Imaging and other studies: I have personally reviewed pertinent films in PACS   chest CT from May 2018 shows no evidence of pulmonary embolism  Lungs are clear      Pulmonary function testing:  Performed   6/13/18   FEV1/FVC ratio 76%    FEV1 98% predicted  FVC 98% predicted  No response to bronchodilators   % predicted   % predicted  DLCO corrected for hemoglobin 89 % predicted   methacholine challenge testing showed  Borderline hyper responsiveness Patient is a 68y old  Female who presents with a chief complaint of Sepsis (05 Oct 2019 16:08)        SUBJECTIVE / OVERNIGHT EVENTS: no events overnight. abn pain mildly improved with some nausea. no F/C, CP, SOB, change in urinary freq. tolerating diet.        MEDICATIONS  (STANDING):  enoxaparin Injectable 40 milliGRAM(s) SubCutaneous daily  piperacillin/tazobactam IVPB.. 3.375 Gram(s) IV Intermittent every 12 hours  sodium chloride 0.9%. 1000 milliLiter(s) (100 mL/Hr) IV Continuous <Continuous>    MEDICATIONS  (PRN):  morphine  - Injectable 2 milliGRAM(s) IV Push every 4 hours PRN Moderate Pain (4 - 6)  morphine  - Injectable 4 milliGRAM(s) IV Push every 4 hours PRN Severe Pain (7 - 10)  ondansetron Injectable 4 milliGRAM(s) IV Push  + every 6 hours PRN Nausea      Vital Signs Last 24 Hrs  T(C): 36.5 (05 Oct 2019 14:47), Max: 36.9 (05 Oct 2019 05:06)  T(F): 97.7 (05 Oct 2019 14:47), Max: 98.4 (05 Oct 2019 05:06)  HR: 89 (05 Oct 2019 14:47) (76 - 89)  BP: 108/61 (05 Oct 2019 14:47) (108/61 - 149/96)  BP(mean): --  RR: 18 (05 Oct 2019 14:47) (16 - 18)  SpO2: 99% (05 Oct 2019 14:47) (98% - 99%)  CAPILLARY BLOOD GLUCOSE        I&O's Summary    04 Oct 2019 07:01  -  05 Oct 2019 07:00  --------------------------------------------------------  IN: 546 mL / OUT: 350 mL / NET: 196 mL    05 Oct 2019 07:01  -  05 Oct 2019 18:51  --------------------------------------------------------  IN: 1300 mL / OUT: 0 mL / NET: 1300 mL          PHYSICAL EXAM  GENERAL: NAD, frail   EYES: EOMI, conjunctiva and sclera clear  ENMT: MMM  NERVOUS SYSTEM:  Alert & Oriented X3, nonfocal exam   CHEST/LUNG: CTAB, no wheezing   HEART: Regular rate and rhythm; No murmurs, rubs, or gallops  ABDOMEN: Soft, R-sided TTP, Nondistended; Bowel sounds present  EXTREMITIES:  no edema, thin      LABS:                        10.1   4.52  )-----------( 186      ( 05 Oct 2019 06:26 )             30.0     10-05    138  |  103  |  12  ----------------------------<  92  4.1   |  23  |  2.82<H>    Ca    8.0<L>      05 Oct 2019 06:26  Phos  3.7     10-05  Mg     1.8     10-05    TPro  5.3<L>  /  Alb  2.3<L>  /  TBili  0.7  /  DBili  x   /  AST  26  /  ALT  20  /  AlkPhos  168<H>  10-05          Urinalysis Basic - ( 05 Oct 2019 00:00 )    Color: LIGHT YELLOW / Appearance: Lt TURBID / S.009 / pH: 6.5  Gluc: NEGATIVE / Ketone: NEGATIVE  / Bili: NEGATIVE / Urobili: NORMAL   Blood: NEGATIVE / Protein: 50 / Nitrite: NEGATIVE   Leuk Esterase: TRACE / RBC: 11-25 / WBC 11-25   Sq Epi: FEW / Non Sq Epi: x / Bacteria: NEGATIVE        RADIOLOGY & ADDITIONAL TESTS:    Imaging Personally Reviewed:  Consultant(s) Notes Reviewed:    Care Discussed with Consultants/Other Providers: nephrology- SAMREEN management Patient is a 68y old  Female who presents with a chief complaint of Sepsis (05 Oct 2019 16:08)        SUBJECTIVE / OVERNIGHT EVENTS: no events overnight. abn pain mildly improved with some nausea. no F/C, CP, SOB, change in urinary freq. tolerating diet.        MEDICATIONS  (STANDING):  enoxaparin Injectable 40 milliGRAM(s) SubCutaneous daily  piperacillin/tazobactam IVPB.. 3.375 Gram(s) IV Intermittent every 12 hours  sodium chloride 0.9%. 1000 milliLiter(s) (100 mL/Hr) IV Continuous <Continuous>    MEDICATIONS  (PRN):  morphine  - Injectable 2 milliGRAM(s) IV Push every 4 hours PRN Moderate Pain (4 - 6)  morphine  - Injectable 4 milliGRAM(s) IV Push every 4 hours PRN Severe Pain (7 - 10)  ondansetron Injectable 4 milliGRAM(s) IV Push  + every 6 hours PRN Nausea      Vital Signs Last 24 Hrs  T(C): 36.5 (05 Oct 2019 14:47), Max: 36.9 (05 Oct 2019 05:06)  T(F): 97.7 (05 Oct 2019 14:47), Max: 98.4 (05 Oct 2019 05:06)  HR: 89 (05 Oct 2019 14:47) (76 - 89)  BP: 108/61 (05 Oct 2019 14:47) (108/61 - 149/96)  BP(mean): --  RR: 18 (05 Oct 2019 14:47) (16 - 18)  SpO2: 99% (05 Oct 2019 14:47) (98% - 99%)  CAPILLARY BLOOD GLUCOSE        I&O's Summary    04 Oct 2019 07:01  -  05 Oct 2019 07:00  --------------------------------------------------------  IN: 546 mL / OUT: 350 mL / NET: 196 mL    05 Oct 2019 07:01  -  05 Oct 2019 18:51  --------------------------------------------------------  IN: 1300 mL / OUT: 0 mL / NET: 1300 mL          PHYSICAL EXAM  GENERAL: NAD, frail   EYES: EOMI, conjunctiva and sclera clear  ENMT: MMM  NERVOUS SYSTEM:  Alert & Oriented X3, nonfocal exam   CHEST/LUNG: CTAB, no wheezing   HEART: Regular rate and rhythm; No murmurs, rubs, or gallops  ABDOMEN: Soft, R-sided TTP, Nondistended; Bowel sounds present  EXTREMITIES:  no edema, thin      LABS:                        10.1   4.52  )-----------( 186      ( 05 Oct 2019 06:26 )             30.0     10-    138  |  103  |  12  ----------------------------<  92  4.1   |  23  |  2.82<H>    Ca    8.0<L>      05 Oct 2019 06:26  Phos  3.7     10-  Mg     1.8     10    TPro  5.3<L>  /  Alb  2.3<L>  /  TBili  0.7  /  DBili  x   /  AST  26  /  ALT  20  /  AlkPhos  168<H>  10-05          Urinalysis Basic - ( 05 Oct 2019 00:00 )    Color: LIGHT YELLOW / Appearance: Lt TURBID / S.009 / pH: 6.5  Gluc: NEGATIVE / Ketone: NEGATIVE  / Bili: NEGATIVE / Urobili: NORMAL   Blood: NEGATIVE / Protein: 50 / Nitrite: NEGATIVE   Leuk Esterase: TRACE / RBC: 11-25 / WBC 11-25   Sq Epi: FEW / Non Sq Epi: x / Bacteria: NEGATIVE        RADIOLOGY & ADDITIONAL TESTS:  < from: US Kidney and Bladder (10.05.19 @ 09:06) >  Right kidney:  10.6 cm. Normal in size without hydronephrosis. Mild   increase renal echogenicity.    Left kidney:  9.4 cm. Normal in size without hydronephrosis. Mild   increase renal echogenicity.    Urinary bladder: Urinary bladder is contracted limiting its evaluation    IMPRESSION:     Mild increase renal echogenicity suggestive of underlying renal   parenchymal disease. No hydronephrosis.    < end of copied text >        Imaging Personally Reviewed:  Consultant(s) Notes Reviewed:    Care Discussed with Consultants/Other Providers: nephrology- SAMREEN management

## 2019-10-05 NOTE — PROGRESS NOTE ADULT - PROBLEM SELECTOR PLAN 7
Soft BP  -Holding amlodipine, restart as tolerated Nutrition consult appreciated, Ensure Enlive TID added to diet

## 2019-10-05 NOTE — CONSULT NOTE ADULT - CONSULT REASON
Occluded Fem-Fem bypass seen on CT
Pancreatic cancer
SAMREEN
Alert and oriented to person, place, time/situation. normal mood and affect.

## 2019-10-05 NOTE — CONSULT NOTE ADULT - SUBJECTIVE AND OBJECTIVE BOX
[facility] DIVISION OF KIDNEY DISEASES AND HYPERTENSION -- [phone]  -- INITIAL CONSULT NOTE  --------------------------------------------------------------------------------  HPI: 68-year-old female with medical history notable for Stage IV pancreatic adenocarcinoma c/b biliary obstruction (Gemcitabine/Abraxane), COPD, chronic anemia, HTN, PVD s/p RLE stenting admitted with weakness. Nephrology consulted for SAMREEN. Pt. admitted for cecal colitis being treated with vancomycin/zosyn, she reports poor oral intake with mild diarrhea. Pt. noted to have CT with contrast on 10/2/19. Upon lab review of [facility] Scr WNL since 2016. Scr 0.52 on 19. Scr WNL at 0.58 on admission 10/2/19 and increase to 1.46 on 10/4/19 with further rise to 2.82 on 10/5/19.     Pt. was seen and examined with daughter at bedside, feels weak and tired however states mildly improved. Still having poor oral intake and reports decrease UOP.     PAST HISTORY  --------------------------------------------------------------------------------  PAST MEDICAL & SURGICAL HISTORY:  Pancreatic adenocarcinoma: Stage IV  Peripheral vascular disease: RLE s/p stenting; R femorofemoral bypass in 2018  Iron deficiency anemia  Smoking history: ~ quit ~ 2017  HTN (hypertension)  Status post femorofemoral bypass surgery: Right lower extremity  S/P hysterectomy  History of intravascular stent placement: ~ RLE ([facility] ~ 2018)  History of  section    FAMILY HISTORY:  FH: hypertension  Family history of cancer: ~ mother (unknown type)  Family history of hypertension: ~ mother    PAST SOCIAL HISTORY:  Former smoker  Lives at home    ALLERGIES & MEDICATIONS  --------------------------------------------------------------------------------  Allergies    No Known Allergies    Intolerances      Standing Inpatient Medications  enoxaparin Injectable 40 milliGRAM(s) SubCutaneous daily  piperacillin/tazobactam IVPB.. 3.375 Gram(s) IV Intermittent every 12 hours  sodium chloride 0.9%. 1000 milliLiter(s) IV Continuous <Continuous>    PRN Inpatient Medications  morphine  - Injectable 2 milliGRAM(s) IV Push every 4 hours PRN  morphine  - Injectable 4 milliGRAM(s) IV Push every 4 hours PRN  ondansetron Injectable 4 milliGRAM(s) IV Push every 6 hours PRN      REVIEW OF SYSTEMS  --------------------------------------------------------------------------------  Gen: No fevers/chills +poor oral intake  Respiratory: No dyspnea, cough  CV: No chest pain  GI: No abdominal pain, diarrhea  : No dysuria, hematuria  MSK: No  edema  Heme: No easy bruising or bleeding  Psych: No significant depression    All other systems were reviewed and are negative, except as noted.    VITALS/PHYSICAL EXAM  --------------------------------------------------------------------------------  T(C): 36.5 (10-05-19 @ 14:47), Max: 36.9 (10-05-19 @ 05:06)  HR: 89 (10-05-19 @ 14:47) (76 - 89)  BP: 108/61 (10-05-19 @ 14:47) (108/61 - 149/96)  RR: 18 (10-05-19 @ 14:47) (16 - 18)  SpO2: 99% (10-05-19 @ 14:47) (98% - 99%)  Wt(kg): --        10-04-19 @ 07:01  -  10-05-19 @ 07:00  --------------------------------------------------------  IN: 546 mL / OUT: 350 mL / NET: 196 mL      Physical Exam:  	Gen: resting, thin elderly  	HEENT: MMM  	Pulm: CTA B/L  	CV: S1S2  	Abd: Soft, +BS   	Ext: No LE edema B/L  	Neuro: Awake  	Skin: Warm and dry    LABS/STUDIES  --------------------------------------------------------------------------------              10.1   4.52  >-----------<  186      [10-05-19 @ 06:26]              30.0     138  |  103  |  12  ----------------------------<  92      [10-05-19 @ 06:26]  4.1   |  23  |  2.82        Ca     8.0     [10-05-19 @ 06:26]      Mg     1.8     [10-05-19 @ 06:26]      Phos  3.7     [10-05-19 @ 06:26]    TPro  5.3  /  Alb  2.3  /  TBili  0.7  /  DBili  x   /  AST  26  /  ALT  20  /  AlkPhos  168  [10-05-19 @ 06:26]    Creatinine Trend:  SCr 2.82 [10-05 @ 06:26]  SCr 2.30 [10-04 @ 21:29]  SCr 1.49 [10-04 @ 05:40]  SCr 0.76 [10-03 @ 06:10]  SCr 0.69 [10-02 @ 11:11]    Urinalysis - [10-05-19 @ 00:00]      Color LIGHT YELLOW / Appearance Lt TURBID / SG 1.009 / pH 6.5      Gluc NEGATIVE / Ketone NEGATIVE  / Bili NEGATIVE / Urobili NORMAL       Blood NEGATIVE / Protein 50 / Leuk Est TRACE / Nitrite NEGATIVE      RBC 11-25 / WBC 11-25 / Hyaline NEGATIVE / Gran  / Sq Epi FEW / Non Sq Epi  / Bacteria NEGATIVE    Urine Creatinine 43.40      [10-05-19 @ 00:00]  Urine Sodium 34      [10-05-19 @ 00:00]  Urine Osmolality 122      [10-05-19 @ 00:00] [facility] DIVISION OF KIDNEY DISEASES AND HYPERTENSION -- [phone]  -- INITIAL CONSULT NOTE  --------------------------------------------------------------------------------  HPI: 68-year-old female with medical history notable for Stage IV pancreatic adenocarcinoma c/b biliary obstruction (Gemcitabine/Abraxane), COPD, chronic anemia, HTN, PVD s/p RLE stenting admitted with weakness. Nephrology consulted for SAMREEN. Pt. admitted for cecal colitis being treated with vancomycin/zosyn, she reports poor oral intake with mild diarrhea. Pt. noted to have CT with contrast on 10/2/19. Upon lab review of [facility] Scr WNL since 2016. Scr 0.52 on 19. Scr WNL at 0.58 on admission 10/2/19 and increase to 1.46 on 10/4/19 with further rise to 2.82 on 10/5/19.     Pt. was seen and examined with daughter at bedside, feels weak and tired however states mildly improved. Still having poor oral intake and reports decrease UOP. Symptoms have worsened over the past one week.    PAST HISTORY  --------------------------------------------------------------------------------  PAST MEDICAL & SURGICAL HISTORY:  Pancreatic adenocarcinoma: Stage IV  Peripheral vascular disease: RLE s/p stenting; R femorofemoral bypass in 2018  Iron deficiency anemia  Smoking history: ~ quit ~ 2017  HTN (hypertension)  Status post femorofemoral bypass surgery: Right lower extremity  S/P hysterectomy  History of intravascular stent placement: ~ RLE ([facility] ~ 2018)  History of  section    FAMILY HISTORY:  FH: hypertension  Family history of cancer: ~ mother (unknown type)  Family history of hypertension: ~ mother    PAST SOCIAL HISTORY:  Former smoker  Lives at home    ALLERGIES & MEDICATIONS  --------------------------------------------------------------------------------  Allergies    No Known Allergies    Intolerances      Standing Inpatient Medications  enoxaparin Injectable 40 milliGRAM(s) SubCutaneous daily  piperacillin/tazobactam IVPB.. 3.375 Gram(s) IV Intermittent every 12 hours  sodium chloride 0.9%. 1000 milliLiter(s) IV Continuous <Continuous>    PRN Inpatient Medications  morphine  - Injectable 2 milliGRAM(s) IV Push every 4 hours PRN  morphine  - Injectable 4 milliGRAM(s) IV Push every 4 hours PRN  ondansetron Injectable 4 milliGRAM(s) IV Push every 6 hours PRN      REVIEW OF SYSTEMS  --------------------------------------------------------------------------------  Gen: No fevers/chills +poor oral intake  Respiratory: No dyspnea, cough  CV: No chest pain  GI: +abdominal pain, +diarrhea  : slight decrease in urination  MSK: No  edema  Heme: No easy bruising or bleeding  Psych: No significant depression    All other systems were reviewed and are negative, except as noted.    VITALS/PHYSICAL EXAM  --------------------------------------------------------------------------------  T(C): 36.5 (10-05-19 @ 14:47), Max: 36.9 (10-05-19 @ 05:06)  HR: 89 (10-05-19 @ 14:47) (76 - 89)  BP: 108/61 (10-05-19 @ 14:47) (108/61 - 149/96)  RR: 18 (10-05-19 @ 14:47) (16 - 18)  SpO2: 99% (10-05-19 @ 14:47) (98% - 99%)  Wt(kg): --        10-04-19 @ 07:01  -  10-05-19 @ 07:00  --------------------------------------------------------  IN: 546 mL / OUT: 350 mL / NET: 196 mL      Physical Exam:  	Gen: resting, thin elderly  	HEENT: MMM  	Pulm: CTA B/L  	CV: S1S2  	Abd: Soft, +BS   	Ext: No LE edema B/L  	Neuro: Awake  	Skin: Warm and dry              Psych: appropriate affect    LABS/STUDIES  --------------------------------------------------------------------------------              10.1   4.52  >-----------<  186      [10-05-19 @ 06:26]              30.0     138  |  103  |  12  ----------------------------<  92      [10-05-19 @ 06:26]  4.1   |  23  |  2.82        Ca     8.0     [10-05-19 @ 06:26]      Mg     1.8     [10-05-19 @ 06:26]      Phos  3.7     [10-05-19 @ 06:26]    TPro  5.3  /  Alb  2.3  /  TBili  0.7  /  DBili  x   /  AST  26  /  ALT  20  /  AlkPhos  168  [10-05-19 @ 06:26]    Creatinine Trend:  SCr 2.82 [10-05 @ 06:26]  SCr 2.30 [10-04 @ 21:29]  SCr 1.49 [10-04 @ 05:40]  SCr 0.76 [10-03 @ 06:10]  SCr 0.69 [10-02 @ 11:11]    Urinalysis - [10-05-19 @ 00:00]      Color LIGHT YELLOW / Appearance Lt TURBID / SG 1.009 / pH 6.5      Gluc NEGATIVE / Ketone NEGATIVE  / Bili NEGATIVE / Urobili NORMAL       Blood NEGATIVE / Protein 50 / Leuk Est TRACE / Nitrite NEGATIVE      RBC 11-25 / WBC 11-25 / Hyaline NEGATIVE / Gran  / Sq Epi FEW / Non Sq Epi  / Bacteria NEGATIVE    Urine Creatinine 43.40      [10-05-19 @ 00:00]  Urine Sodium 34      [10-05-19 @ 00:00]  Urine Osmolality 122      [10-05-19 @ 00:00]

## 2019-10-05 NOTE — CONSULT NOTE ADULT - PROBLEM SELECTOR RECOMMENDATION 9
Pt. with SAMREEN in the setting of poor oral intake, IV contrast use, vancomycin/zosyn combination. Upon lab review of [facility] Scr WNL since 2016. Scr 0.52 on 9/25/19. Scr WNL at 0.58 on admission 10/2/19 and increase to 1.46 on 10/4/19 with further rise to 2.82 on 10/5/19. US showed no hydronephrosis. Pt. with hemodynamically mediated SAMREEN vs ATN. Continue with IVF, encourage PO intake. Monitor UOP and daily weights. Avoid volume depletion, NSAIDs, ARB/ACE-I. Dose medications as per eGFR.

## 2019-10-05 NOTE — PROGRESS NOTE ADULT - PROBLEM SELECTOR PLAN 8
Nutrition consult appreciated, Ensure Enlive TID added to diet Lovenox 40mg daily   -PMD office, Dr. Robles [phone] aware hospitalization. Pt will need outpatient f/u upon discharge  dispo: no PT needs, d/c pending management of SAMREEN, advancement of diet and transitioning IV abx to PO

## 2019-10-05 NOTE — PROGRESS NOTE ADULT - ASSESSMENT
68F w/PMH of Stage IV pancreatic adenocarcinoma c/b prior biliary obstruction undergoing active chemo, COPD, chronic anemia, HTN, PVD s/p RLE stenting and bypass and other co-morbidities presenting from home with complaints of weakness, abdominal pain, n/v, admitted meeting SIRS vs. sepsis criteria, found to cecal colitis and small bowel ileus w/probable enteritis on CT A/P, possibly chemo induced,  pancytopenia, and severe protein calorie malnutrition. 68F w/PMH of Stage IV pancreatic adenocarcinoma c/b prior biliary obstruction undergoing active chemo, COPD, chronic anemia, HTN, PVD s/p RLE stenting and bypass presenting from home with complaints of weakness, abdominal pain, n/v, admitted meeting SIRS vs. sepsis criteria, found to cecal colitis and small bowel ileus w/probable enteritis on CT A/P, possibly chemo induced,  pancytopenia, and severe protein calorie malnutrition. 68F w/PMH of Stage IV pancreatic adenocarcinoma c/b prior biliary obstruction undergoing active chemo (last treatment 9/25- [facility]), COPD, chronic anemia, HTN, PVD s/p RLE stenting and bypass presenting from home with complaints of weakness, abdominal pain, n/v, a/f sepsis 2/2 cecal colitis and small bowel ileus w/probable enteritis on CT A/P, possibly chemo induced,  pancytopenia, and severe protein calorie malnutrition:

## 2019-10-05 NOTE — PROGRESS NOTE ADULT - PROBLEM SELECTOR PLAN 9
Lovenox 40mg daily   -PMD office, Dr. Robles [phone] aware hospitalization. Pt will need outpatient f/u upon discharge

## 2019-10-05 NOTE — PROGRESS NOTE ADULT - PROBLEM SELECTOR PLAN 1
Pt with jump in Cr today 1.46 from 0.76, possibly from Vanc toxicity. Pt received 3 doses of Vanc 1g, was due for trough overnight, however pt refused and was dosed 750mg instead per nursing.   -Vanc level ordered  -Repeat BMP pending  -Ulytes pending  -Bladder scan to r/o obstruction  -IVF NS 75cc/hr  -If Cr continues to uptrend, will order Renal US and consult nephrology.   -Avoid nephrotoxic agents -renal US as stated above, appreciate nephro recs. suspect in setting of sepsis (ATN?) vs pre-renal   -c/w IVF and trend Cr

## 2019-10-05 NOTE — PROGRESS NOTE ADULT - PROBLEM SELECTOR PLAN 5
Likely induced by recent chemotherapy +/- acute infection. ANC currently >500. No signs of active bleeding on exam  -Improving, trend cbc Improved  -Serial abdominal exams  -Monitor for BMs  -tolerating clears

## 2019-10-05 NOTE — PROGRESS NOTE ADULT - PROBLEM SELECTOR PLAN 2
CT w/cecal colitis and small bowel ileus w/probable enteritis, likely due to recent chemotherapy. no neutropenia   -C/w Zosyn 2.25 q6 as CrCl ~37  -Hold Vancomycin, will need to dose by level which is pending   -Blood Cx NGTD x48 hours  -Can send GI stool PCR, stool cx if pt has diarrhea, pt reports resolution since admission -sepsis resolved   CT w/cecal colitis and small bowel ileus w/probable enteritis, likely due to recent chemotherapy. no neutropenia   -C/w Zosyn BID, d/c vancomycin, IVF. day 4 abx   -Blood Cx NGTD x48 hours  -Can send GI stool PCR, stool cx if pt has diarrhea, pt reports resolution since admission

## 2019-10-06 LAB
ALBUMIN SERPL ELPH-MCNC: 2.1 G/DL — LOW (ref 3.3–5)
ALP SERPL-CCNC: 148 U/L — HIGH (ref 40–120)
ALT FLD-CCNC: 17 U/L — SIGNIFICANT CHANGE UP (ref 4–33)
ANION GAP SERPL CALC-SCNC: 14 MMO/L — SIGNIFICANT CHANGE UP (ref 7–14)
AST SERPL-CCNC: 24 U/L — SIGNIFICANT CHANGE UP (ref 4–32)
BACTERIA BLD CULT: SIGNIFICANT CHANGE UP
BACTERIA BLD CULT: SIGNIFICANT CHANGE UP
BASOPHILS # BLD AUTO: 0.01 K/UL — SIGNIFICANT CHANGE UP (ref 0–0.2)
BASOPHILS NFR BLD AUTO: 0.2 % — SIGNIFICANT CHANGE UP (ref 0–2)
BILIRUB SERPL-MCNC: 0.6 MG/DL — SIGNIFICANT CHANGE UP (ref 0.2–1.2)
BUN SERPL-MCNC: 12 MG/DL — SIGNIFICANT CHANGE UP (ref 7–23)
CALCIUM SERPL-MCNC: 8 MG/DL — LOW (ref 8.4–10.5)
CHLORIDE SERPL-SCNC: 105 MMOL/L — SIGNIFICANT CHANGE UP (ref 98–107)
CO2 SERPL-SCNC: 20 MMOL/L — LOW (ref 22–31)
CREAT SERPL-MCNC: 3.24 MG/DL — HIGH (ref 0.5–1.3)
EOSINOPHIL # BLD AUTO: 0.02 K/UL — SIGNIFICANT CHANGE UP (ref 0–0.5)
EOSINOPHIL NFR BLD AUTO: 0.4 % — SIGNIFICANT CHANGE UP (ref 0–6)
GI PCR PANEL, STOOL: SIGNIFICANT CHANGE UP
GLUCOSE SERPL-MCNC: 82 MG/DL — SIGNIFICANT CHANGE UP (ref 70–99)
HCT VFR BLD CALC: 29.4 % — LOW (ref 34.5–45)
HGB BLD-MCNC: 9.2 G/DL — LOW (ref 11.5–15.5)
IMM GRANULOCYTES NFR BLD AUTO: 1.1 % — SIGNIFICANT CHANGE UP (ref 0–1.5)
LYMPHOCYTES # BLD AUTO: 2 K/UL — SIGNIFICANT CHANGE UP (ref 1–3.3)
LYMPHOCYTES # BLD AUTO: 37.1 % — SIGNIFICANT CHANGE UP (ref 13–44)
MAGNESIUM SERPL-MCNC: 1.7 MG/DL — SIGNIFICANT CHANGE UP (ref 1.6–2.6)
MCHC RBC-ENTMCNC: 26.5 PG — LOW (ref 27–34)
MCHC RBC-ENTMCNC: 31.3 % — LOW (ref 32–36)
MCV RBC AUTO: 84.7 FL — SIGNIFICANT CHANGE UP (ref 80–100)
MONOCYTES # BLD AUTO: 0.74 K/UL — SIGNIFICANT CHANGE UP (ref 0–0.9)
MONOCYTES NFR BLD AUTO: 13.7 % — SIGNIFICANT CHANGE UP (ref 2–14)
NEUTROPHILS # BLD AUTO: 2.56 K/UL — SIGNIFICANT CHANGE UP (ref 1.8–7.4)
NEUTROPHILS NFR BLD AUTO: 47.5 % — SIGNIFICANT CHANGE UP (ref 43–77)
NRBC # FLD: 0 K/UL — SIGNIFICANT CHANGE UP (ref 0–0)
PHOSPHATE SERPL-MCNC: 4.3 MG/DL — SIGNIFICANT CHANGE UP (ref 2.5–4.5)
PLATELET # BLD AUTO: 241 K/UL — SIGNIFICANT CHANGE UP (ref 150–400)
PMV BLD: 10.3 FL — SIGNIFICANT CHANGE UP (ref 7–13)
POTASSIUM SERPL-MCNC: 3.9 MMOL/L — SIGNIFICANT CHANGE UP (ref 3.5–5.3)
POTASSIUM SERPL-SCNC: 3.9 MMOL/L — SIGNIFICANT CHANGE UP (ref 3.5–5.3)
PROT SERPL-MCNC: 4.9 G/DL — LOW (ref 6–8.3)
RBC # BLD: 3.47 M/UL — LOW (ref 3.8–5.2)
RBC # FLD: 15 % — HIGH (ref 10.3–14.5)
SODIUM SERPL-SCNC: 139 MMOL/L — SIGNIFICANT CHANGE UP (ref 135–145)
SPECIMEN SOURCE: SIGNIFICANT CHANGE UP
SPECIMEN SOURCE: SIGNIFICANT CHANGE UP
WBC # BLD: 5.39 K/UL — SIGNIFICANT CHANGE UP (ref 3.8–10.5)
WBC # FLD AUTO: 5.39 K/UL — SIGNIFICANT CHANGE UP (ref 3.8–10.5)

## 2019-10-06 PROCEDURE — 99232 SBSQ HOSP IP/OBS MODERATE 35: CPT | Mod: GC

## 2019-10-06 PROCEDURE — 99233 SBSQ HOSP IP/OBS HIGH 50: CPT

## 2019-10-06 PROCEDURE — 99497 ADVNCD CARE PLAN 30 MIN: CPT

## 2019-10-06 RX ORDER — METOCLOPRAMIDE HCL 10 MG
5 TABLET ORAL EVERY 6 HOURS
Refills: 0 | Status: DISCONTINUED | OUTPATIENT
Start: 2019-10-06 | End: 2019-10-14

## 2019-10-06 RX ORDER — CIPROFLOXACIN LACTATE 400MG/40ML
250 VIAL (ML) INTRAVENOUS EVERY 24 HOURS
Refills: 0 | Status: DISCONTINUED | OUTPATIENT
Start: 2019-10-06 | End: 2019-10-07

## 2019-10-06 RX ORDER — METRONIDAZOLE 500 MG
500 TABLET ORAL EVERY 8 HOURS
Refills: 0 | Status: DISCONTINUED | OUTPATIENT
Start: 2019-10-06 | End: 2019-10-07

## 2019-10-06 RX ORDER — PIPERACILLIN AND TAZOBACTAM 4; .5 G/20ML; G/20ML
3.38 INJECTION, POWDER, LYOPHILIZED, FOR SOLUTION INTRAVENOUS EVERY 12 HOURS
Refills: 0 | Status: DISCONTINUED | OUTPATIENT
Start: 2019-10-06 | End: 2019-10-06

## 2019-10-06 RX ADMIN — Medication 5 MILLIGRAM(S): at 16:13

## 2019-10-06 RX ADMIN — Medication 500 MILLIGRAM(S): at 21:57

## 2019-10-06 RX ADMIN — ENOXAPARIN SODIUM 40 MILLIGRAM(S): 100 INJECTION SUBCUTANEOUS at 12:00

## 2019-10-06 RX ADMIN — ONDANSETRON 4 MILLIGRAM(S): 8 TABLET, FILM COATED ORAL at 13:35

## 2019-10-06 RX ADMIN — Medication 250 MILLIGRAM(S): at 21:57

## 2019-10-06 RX ADMIN — PIPERACILLIN AND TAZOBACTAM 25 GRAM(S): 4; .5 INJECTION, POWDER, LYOPHILIZED, FOR SOLUTION INTRAVENOUS at 11:58

## 2019-10-06 RX ADMIN — PIPERACILLIN AND TAZOBACTAM 25 GRAM(S): 4; .5 INJECTION, POWDER, LYOPHILIZED, FOR SOLUTION INTRAVENOUS at 00:07

## 2019-10-06 NOTE — PROGRESS NOTE ADULT - PROBLEM SELECTOR PLAN 2
-sepsis resolved   CT w/cecal colitis and small bowel ileus w/probable enteritis, likely due to recent chemotherapy. no neutropenia   -C/w Zosyn BID, d/c vancomycin, IVF. day 4 abx   -Blood Cx NGTD x48 hours  -Can send GI stool PCR, stool cx if pt has diarrhea, pt reports resolution since admission -sepsis resolved, CT with above findings, mild neutropenia resolved. blood cx NGTD. GI PCR neg  - on Zosyn BID. day 5 abx. given improvement in symptoms, normal WBC, afebrile for >48 hours and cx NGTD, will change to cipro, flagyl- renally dose to complete 14 day course of abx

## 2019-10-06 NOTE — PROGRESS NOTE ADULT - PROBLEM SELECTOR PLAN 5
Improved  -Serial abdominal exams  -Monitor for BMs  -tolerating clears Improved  -Serial abdominal exams  -tolerating clears and ADAT

## 2019-10-06 NOTE — PROGRESS NOTE ADULT - PROBLEM SELECTOR PLAN 8
Lovenox 40mg daily   -PMD office, Dr. Robles [phone] aware hospitalization. Pt will need outpatient f/u upon discharge  dispo: no PT needs, d/c pending management of SAMREEN, advancement of diet and transitioning IV abx to PO Lovenox 40mg daily   -PMD office, Dr. Robles [phone] aware hospitalization. Pt will need outpatient f/u upon discharge  dispo: no PT needs, d/c pending management of SAMREEN, advancement of diet. GOC discussion today, >20 minutes spent face to face at bedside. pt agrees to DNR/DNI. MOLST form filled out and in chart.

## 2019-10-06 NOTE — PROGRESS NOTE ADULT - SUBJECTIVE AND OBJECTIVE BOX
[facility] DIVISION OF KIDNEY DISEASES AND HYPERTENSION -- FOLLOW UP NOTE  --------------------------------------------------------------------------------  HPI: 68-year-old female with medical history notable for Stage IV pancreatic adenocarcinoma c/b biliary obstruction (Gemcitabine/Abraxane), COPD, chronic anemia, HTN, PVD s/p RLE stenting admitted with weakness. Nephrology consulted for SAMREEN. Pt. found to have cecal colitis being treated with vancomycin/zosyn, she reports poor oral intake with mild diarrhea. Pt. noted to have CT with contrast on 10/2/19. Upon lab review of [facility] Scr WNL since 2016. Scr 0.52 on 9/25/19. Scr WNL at 0.58 on admission 10/2/19 and increase to 1.46 on 10/4/19 with further rise to 2.82 on 10/5/19 and uptrending to 3.24 on 10/6/19.    Pt. was seen and examined at bedside, feels improved, tolerating oral food yesterday.     PAST HISTORY  --------------------------------------------------------------------------------  No significant changes to PMH, PSH, FHx, SHx, unless otherwise noted    ALLERGIES & MEDICATIONS  --------------------------------------------------------------------------------  Allergies    No Known Allergies    Intolerances      Standing Inpatient Medications  enoxaparin Injectable 40 milliGRAM(s) SubCutaneous daily  piperacillin/tazobactam IVPB.. 3.375 Gram(s) IV Intermittent every 12 hours  sodium chloride 0.9%. 1000 milliLiter(s) IV Continuous <Continuous>    PRN Inpatient Medications  morphine  - Injectable 2 milliGRAM(s) IV Push every 4 hours PRN  morphine  - Injectable 4 milliGRAM(s) IV Push every 4 hours PRN  ondansetron Injectable 4 milliGRAM(s) IV Push every 6 hours PRN      REVIEW OF SYSTEMS  --------------------------------------------------------------------------------  Gen: No fevers/chills +poor oral intake  Respiratory: No dyspnea, cough  CV: No chest pain  GI: +abdominal pain, +diarrhea  : slight decrease in urination  MSK: No  edema  Heme: No easy bruising or bleeding  Psych: No significant depression    All other systems were reviewed and are negative, except as noted.    VITALS/PHYSICAL EXAM  --------------------------------------------------------------------------------  T(C): 36.7 (10-06-19 @ 05:09), Max: 36.7 (10-06-19 @ 05:09)  HR: 90 (10-06-19 @ 05:09) (76 - 90)  BP: 134/81 (10-06-19 @ 05:09) (108/61 - 134/81)  RR: 18 (10-06-19 @ 05:09) (17 - 18)  SpO2: 100% (10-06-19 @ 05:09) (99% - 100%)  Wt(kg): --    10-05-19 @ 07:01  -  10-06-19 @ 07:00  --------------------------------------------------------  IN: 1300 mL / OUT: 0 mL / NET: 1300 mL    Physical Exam:  	Gen: resting, thin elderly  	HEENT: MMM  	Pulm: CTA B/L  	CV: S1S2  	Abd: Soft, +BS   	Ext: No LE edema B/L  	Neuro: Awake  	Skin: Warm and dry              Psych: appropriate affect      LABS/STUDIES  --------------------------------------------------------------------------------              9.2    5.39  >-----------<  241      [10-06-19 @ 06:50]              29.4     139  |  105  |  12  ----------------------------<  82      [10-06-19 @ 06:50]  3.9   |  20  |  3.24        Ca     8.0     [10-06-19 @ 06:50]      Mg     1.7     [10-06-19 @ 06:50]      Phos  4.3     [10-06-19 @ 06:50]    TPro  4.9  /  Alb  2.1  /  TBili  0.6  /  DBili  x   /  AST  24  /  ALT  17  /  AlkPhos  148  [10-06-19 @ 06:50]    Creatinine Trend:  SCr 3.24 [10-06 @ 06:50]  SCr 2.82 [10-05 @ 06:26]  SCr 2.30 [10-04 @ 21:29]  SCr 1.49 [10-04 @ 05:40]  SCr 0.76 [10-03 @ 06:10]    Urinalysis - [10-05-19 @ 00:00]      Color LIGHT YELLOW / Appearance Lt TURBID / SG 1.009 / pH 6.5      Gluc NEGATIVE / Ketone NEGATIVE  / Bili NEGATIVE / Urobili NORMAL       Blood NEGATIVE / Protein 50 / Leuk Est TRACE / Nitrite NEGATIVE      RBC 11-25 / WBC 11-25 / Hyaline NEGATIVE / Gran  / Sq Epi FEW / Non Sq Epi  / Bacteria NEGATIVE    Urine Creatinine 43.40      [10-05-19 @ 00:00]  Urine Sodium 34      [10-05-19 @ 00:00]  Urine Osmolality 122      [10-05-19 @ 00:00]    Iron 69, TIBC 151, %sat --      [08-09-19 @ 06:24]  Ferritin 686.1      [08-09-19 @ 06:24]  Vitamin D (25OH) 11.7      [08-09-19 @ 06:24]  TSH 0.67      [08-09-19 @ 06:24]

## 2019-10-06 NOTE — PROGRESS NOTE ADULT - ASSESSMENT
68F w/PMH of Stage IV pancreatic adenocarcinoma c/b prior biliary obstruction undergoing active chemo (last treatment 9/25- [facility]), COPD, chronic anemia, HTN, PVD s/p RLE stenting and bypass presenting from home with complaints of weakness, abdominal pain, n/v, a/f sepsis 2/2 cecal colitis and small bowel ileus w/probable enteritis on CT A/P, possibly chemo induced,  pancytopenia, and severe protein calorie malnutrition: 68F w/PMH of Stage IV pancreatic adenocarcinoma c/b prior biliary obstruction undergoing active chemo (last treatment 9/25- [facility]), COPD, chronic anemia, HTN, PVD s/p RLE stenting and bypass presenting from home with complaints of weakness, abdominal pain, n/v, a/f sepsis 2/2 cecal colitis vs typhlitis, small bowel ileus w/ probable enteritis on CT A/P, possibly chemo induced with pancytopenia (mild neutropenia), and severe protein calorie malnutrition. Neutropenia resolved and now with normal wbc and plts. Cx NGTD. Course c/b SAMREEN:

## 2019-10-06 NOTE — PROGRESS NOTE ADULT - PROBLEM SELECTOR PLAN 1
-renal US as stated above, appreciate nephro recs. suspect in setting of sepsis (ATN?) vs pre-renal   -c/w IVF and trend Cr

## 2019-10-06 NOTE — PROGRESS NOTE ADULT - PROBLEM SELECTOR PLAN 3
Gemcitabine/Abraxane, started Sept 2019, last dose on 9/25  -Outpt followup with Dr. Tong Epps at [facility]/onc recs noted

## 2019-10-06 NOTE — PROGRESS NOTE ADULT - PROBLEM SELECTOR PLAN 1
Pt. with SAMREEN in the setting of poor oral intake, IV contrast use, vancomycin/zosyn combination. Upon lab review of [facility] Scr WNL since 2016. Scr 0.52 on 9/25/19. Scr WNL at 0.58 on admission 10/2/19 and increase to 1.46 on 10/4/19 with further rise to 2.82 on 10/5/19 and uptrending to 3.24 on 10/6/19. US showed no hydronephrosis. FeNa 1.6%. Pt. with ? ATN. Continue with IVF, encourage PO intake. Monitor UOP and daily weights. Avoid volume depletion, NSAIDs, ARB/ACE-I. Dose medications as per eGFR.

## 2019-10-06 NOTE — PROGRESS NOTE ADULT - PROBLEM SELECTOR PLAN 4
in setting of recent chemo, now with normal wbc and plt. continues to have anemia and will trend h/h

## 2019-10-06 NOTE — PROGRESS NOTE ADULT - SUBJECTIVE AND OBJECTIVE BOX
Patient is a 68y old  Female who presents with a chief complaint of Sepsis (06 Oct 2019 09:17)        SUBJECTIVE / OVERNIGHT EVENTS:      MEDICATIONS  (STANDING):  enoxaparin Injectable 40 milliGRAM(s) SubCutaneous daily  piperacillin/tazobactam IVPB.. 3.375 Gram(s) IV Intermittent every 12 hours  sodium chloride 0.9%. 1000 milliLiter(s) (100 mL/Hr) IV Continuous <Continuous>    MEDICATIONS  (PRN):  metoclopramide Injectable 5 milliGRAM(s) IV Push every 6 hours PRN N/V  morphine  - Injectable 2 milliGRAM(s) IV Push every 4 hours PRN Moderate Pain (4 - 6)  morphine  - Injectable 4 milliGRAM(s) IV Push every 4 hours PRN Severe Pain (7 - 10)  ondansetron Injectable 4 milliGRAM(s) IV Push every 6 hours PRN Nausea      Vital Signs Last 24 Hrs  T(C): 36.5 (06 Oct 2019 10:30), Max: 36.7 (06 Oct 2019 05:09)  T(F): 97.7 (06 Oct 2019 10:30), Max: 98 (06 Oct 2019 05:09)  HR: 63 (06 Oct 2019 10:30) (63 - 90)  BP: 134/75 (06 Oct 2019 10:30) (108/61 - 134/81)  BP(mean): --  RR: 18 (06 Oct 2019 10:30) (17 - 18)  SpO2: 100% (06 Oct 2019 10:30) (99% - 100%)  CAPILLARY BLOOD GLUCOSE        I&O's Summary    05 Oct 2019 07:01  -  06 Oct 2019 07:00  --------------------------------------------------------  IN: 1300 mL / OUT: 0 mL / NET: 1300 mL          PHYSICAL EXAM  GENERAL: NAD, well-developed  HEAD:  Atraumatic, Normocephalic  EYES: EOMI, PERRLA, conjunctiva and sclera clear  NECK: Supple, No JVD  CHEST/LUNG: Clear to auscultation bilaterally; No wheeze  HEART: Regular rate and rhythm; No murmurs, rubs, or gallops  ABDOMEN: Soft, Nontender, Nondistended; Bowel sounds present  EXTREMITIES:  2+ Peripheral Pulses, No clubbing, cyanosis, or edema  PSYCH: AAOx3  SKIN: No rashes or lesions    LABS:                        9.2    5.39  )-----------( 241      ( 06 Oct 2019 06:50 )             29.4     10    139  |  105  |  12  ----------------------------<  82  3.9   |  20<L>  |  3.24<H>    Ca    8.0<L>      06 Oct 2019 06:50  Phos  4.3     10-06  Mg     1.7     10-06    TPro  4.9<L>  /  Alb  2.1<L>  /  TBili  0.6  /  DBili  x   /  AST  24  /  ALT  17  /  AlkPhos  148<H>  10-06          Urinalysis Basic - ( 05 Oct 2019 00:00 )    Color: LIGHT YELLOW / Appearance: Lt TURBID / S.009 / pH: 6.5  Gluc: NEGATIVE / Ketone: NEGATIVE  / Bili: NEGATIVE / Urobili: NORMAL   Blood: NEGATIVE / Protein: 50 / Nitrite: NEGATIVE   Leuk Esterase: TRACE / RBC: 11-25 / WBC 11-25   Sq Epi: FEW / Non Sq Epi: x / Bacteria: NEGATIVE        RADIOLOGY & ADDITIONAL TESTS:    Imaging Personally Reviewed:  Consultant(s) Notes Reviewed:    Care Discussed with Consultants/Other Providers: Patient is a 68y old  Female who presents with a chief complaint of Sepsis (06 Oct 2019 09:17)        SUBJECTIVE / OVERNIGHT EVENTS: continues to have mild abn discomfort and nausea, but improved compared to yesterday. agreeable to advancing diet. no F/C, CP, SOB, D/C, change in urinary freq.         MEDICATIONS  (STANDING):  enoxaparin Injectable 40 milliGRAM(s) SubCutaneous daily  piperacillin/tazobactam IVPB.. 3.375 Gram(s) IV Intermittent every 12 hours  sodium chloride 0.9%. 1000 milliLiter(s) (100 mL/Hr) IV Continuous <Continuous>    MEDICATIONS  (PRN):  metoclopramide Injectable 5 milliGRAM(s) IV Push every 6 hours PRN N/V  morphine  - Injectable 2 milliGRAM(s) IV Push every 4 hours PRN Moderate Pain (4 - 6)  morphine  - Injectable 4 milliGRAM(s) IV Push every 4 hours PRN Severe Pain (7 - 10)  ondansetron Injectable 4 milliGRAM(s) IV Push every 6 hours PRN Nausea      Vital Signs Last 24 Hrs  T(C): 36.5 (06 Oct 2019 10:30), Max: 36.7 (06 Oct 2019 05:09)  T(F): 97.7 (06 Oct 2019 10:30), Max: 98 (06 Oct 2019 05:09)  HR: 63 (06 Oct 2019 10:30) (63 - 90)  BP: 134/75 (06 Oct 2019 10:30) (108/61 - 134/81)  BP(mean): --  RR: 18 (06 Oct 2019 10:30) (17 - 18)  SpO2: 100% (06 Oct 2019 10:30) (99% - 100%)  CAPILLARY BLOOD GLUCOSE        I&O's Summary    05 Oct 2019 07:01  -  06 Oct 2019 07:00  --------------------------------------------------------  IN: 1300 mL / OUT: 0 mL / NET: 1300 mL          PHYSICAL EXAM  GENERAL: NAD, well-developed  HEAD:  Atraumatic, Normocephalic  EYES: EOMI, PERRLA, conjunctiva and sclera clear  NECK: Supple, No JVD  CHEST/LUNG: Clear to auscultation bilaterally; No wheeze  HEART: Regular rate and rhythm; No murmurs, rubs, or gallops  ABDOMEN: Soft, Nontender, Nondistended; Bowel sounds present  EXTREMITIES:  2+ Peripheral Pulses, No clubbing, cyanosis, or edema  PSYCH: AAOx3  SKIN: No rashes or lesions    LABS:                        9.2    5.39  )-----------( 241      ( 06 Oct 2019 06:50 )             29.4     10    139  |  105  |  12  ----------------------------<  82  3.9   |  20<L>  |  3.24<H>    Ca    8.0<L>      06 Oct 2019 06:50  Phos  4.3     10-06  Mg     1.7     10-06    TPro  4.9<L>  /  Alb  2.1<L>  /  TBili  0.6  /  DBili  x   /  AST  24  /  ALT  17  /  AlkPhos  148<H>  10-06          Urinalysis Basic - ( 05 Oct 2019 00:00 )    Color: LIGHT YELLOW / Appearance: Lt TURBID / S.009 / pH: 6.5  Gluc: NEGATIVE / Ketone: NEGATIVE  / Bili: NEGATIVE / Urobili: NORMAL   Blood: NEGATIVE / Protein: 50 / Nitrite: NEGATIVE   Leuk Esterase: TRACE / RBC: 11-25 / WBC 11-25   Sq Epi: FEW / Non Sq Epi: x / Bacteria: NEGATIVE        RADIOLOGY & ADDITIONAL TESTS:    Imaging Personally Reviewed:  Consultant(s) Notes Reviewed:    Care Discussed with Consultants/Other Providers:

## 2019-10-07 LAB
ALBUMIN SERPL ELPH-MCNC: 2.3 G/DL — LOW (ref 3.3–5)
ALP SERPL-CCNC: 152 U/L — HIGH (ref 40–120)
ALT FLD-CCNC: 14 U/L — SIGNIFICANT CHANGE UP (ref 4–33)
ANION GAP SERPL CALC-SCNC: 15 MMO/L — HIGH (ref 7–14)
AST SERPL-CCNC: 24 U/L — SIGNIFICANT CHANGE UP (ref 4–32)
BACTERIA STL CULT: SIGNIFICANT CHANGE UP
BASOPHILS # BLD AUTO: 0.01 K/UL — SIGNIFICANT CHANGE UP (ref 0–0.2)
BASOPHILS NFR BLD AUTO: 0.2 % — SIGNIFICANT CHANGE UP (ref 0–2)
BILIRUB SERPL-MCNC: 0.6 MG/DL — SIGNIFICANT CHANGE UP (ref 0.2–1.2)
BUN SERPL-MCNC: 14 MG/DL — SIGNIFICANT CHANGE UP (ref 7–23)
CALCIUM SERPL-MCNC: 8.3 MG/DL — LOW (ref 8.4–10.5)
CHLORIDE SERPL-SCNC: 106 MMOL/L — SIGNIFICANT CHANGE UP (ref 98–107)
CO2 SERPL-SCNC: 20 MMOL/L — LOW (ref 22–31)
CREAT SERPL-MCNC: 3.59 MG/DL — HIGH (ref 0.5–1.3)
EOSINOPHIL # BLD AUTO: 0.04 K/UL — SIGNIFICANT CHANGE UP (ref 0–0.5)
EOSINOPHIL NFR BLD AUTO: 0.6 % — SIGNIFICANT CHANGE UP (ref 0–6)
GLUCOSE SERPL-MCNC: 90 MG/DL — SIGNIFICANT CHANGE UP (ref 70–99)
HCT VFR BLD CALC: 29.9 % — LOW (ref 34.5–45)
HGB BLD-MCNC: 9.6 G/DL — LOW (ref 11.5–15.5)
IMM GRANULOCYTES NFR BLD AUTO: 0.6 % — SIGNIFICANT CHANGE UP (ref 0–1.5)
LYMPHOCYTES # BLD AUTO: 1.85 K/UL — SIGNIFICANT CHANGE UP (ref 1–3.3)
LYMPHOCYTES # BLD AUTO: 29.9 % — SIGNIFICANT CHANGE UP (ref 13–44)
MAGNESIUM SERPL-MCNC: 1.7 MG/DL — SIGNIFICANT CHANGE UP (ref 1.6–2.6)
MCHC RBC-ENTMCNC: 26.9 PG — LOW (ref 27–34)
MCHC RBC-ENTMCNC: 32.1 % — SIGNIFICANT CHANGE UP (ref 32–36)
MCV RBC AUTO: 83.8 FL — SIGNIFICANT CHANGE UP (ref 80–100)
MONOCYTES # BLD AUTO: 0.75 K/UL — SIGNIFICANT CHANGE UP (ref 0–0.9)
MONOCYTES NFR BLD AUTO: 12.1 % — SIGNIFICANT CHANGE UP (ref 2–14)
NEUTROPHILS # BLD AUTO: 3.5 K/UL — SIGNIFICANT CHANGE UP (ref 1.8–7.4)
NEUTROPHILS NFR BLD AUTO: 56.6 % — SIGNIFICANT CHANGE UP (ref 43–77)
NRBC # FLD: 0.02 K/UL — SIGNIFICANT CHANGE UP (ref 0–0)
PHOSPHATE SERPL-MCNC: 4.9 MG/DL — HIGH (ref 2.5–4.5)
PLATELET # BLD AUTO: 352 K/UL — SIGNIFICANT CHANGE UP (ref 150–400)
PMV BLD: 10.2 FL — SIGNIFICANT CHANGE UP (ref 7–13)
POTASSIUM SERPL-MCNC: 4.1 MMOL/L — SIGNIFICANT CHANGE UP (ref 3.5–5.3)
POTASSIUM SERPL-SCNC: 4.1 MMOL/L — SIGNIFICANT CHANGE UP (ref 3.5–5.3)
PROT SERPL-MCNC: 5.2 G/DL — LOW (ref 6–8.3)
RBC # BLD: 3.57 M/UL — LOW (ref 3.8–5.2)
RBC # FLD: 15.7 % — HIGH (ref 10.3–14.5)
SODIUM SERPL-SCNC: 141 MMOL/L — SIGNIFICANT CHANGE UP (ref 135–145)
WBC # BLD: 6.19 K/UL — SIGNIFICANT CHANGE UP (ref 3.8–10.5)
WBC # FLD AUTO: 6.19 K/UL — SIGNIFICANT CHANGE UP (ref 3.8–10.5)

## 2019-10-07 PROCEDURE — 99233 SBSQ HOSP IP/OBS HIGH 50: CPT

## 2019-10-07 PROCEDURE — 99232 SBSQ HOSP IP/OBS MODERATE 35: CPT | Mod: GC

## 2019-10-07 RX ORDER — HEPARIN SODIUM 5000 [USP'U]/ML
5000 INJECTION INTRAVENOUS; SUBCUTANEOUS EVERY 12 HOURS
Refills: 0 | Status: DISCONTINUED | OUTPATIENT
Start: 2019-10-07 | End: 2019-10-14

## 2019-10-07 RX ORDER — HYDROMORPHONE HYDROCHLORIDE 2 MG/ML
0.5 INJECTION INTRAMUSCULAR; INTRAVENOUS; SUBCUTANEOUS EVERY 6 HOURS
Refills: 0 | Status: DISCONTINUED | OUTPATIENT
Start: 2019-10-07 | End: 2019-10-10

## 2019-10-07 RX ORDER — SODIUM CHLORIDE 9 MG/ML
1000 INJECTION INTRAMUSCULAR; INTRAVENOUS; SUBCUTANEOUS
Refills: 0 | Status: DISCONTINUED | OUTPATIENT
Start: 2019-10-07 | End: 2019-10-08

## 2019-10-07 RX ADMIN — HEPARIN SODIUM 5000 UNIT(S): 5000 INJECTION INTRAVENOUS; SUBCUTANEOUS at 17:30

## 2019-10-07 RX ADMIN — Medication 5 MILLIGRAM(S): at 10:50

## 2019-10-07 RX ADMIN — SODIUM CHLORIDE 100 MILLILITER(S): 9 INJECTION INTRAMUSCULAR; INTRAVENOUS; SUBCUTANEOUS at 11:37

## 2019-10-07 RX ADMIN — Medication 500 MILLIGRAM(S): at 05:27

## 2019-10-07 RX ADMIN — SODIUM CHLORIDE 100 MILLILITER(S): 9 INJECTION INTRAMUSCULAR; INTRAVENOUS; SUBCUTANEOUS at 21:22

## 2019-10-07 RX ADMIN — HYDROMORPHONE HYDROCHLORIDE 0.5 MILLIGRAM(S): 2 INJECTION INTRAMUSCULAR; INTRAVENOUS; SUBCUTANEOUS at 15:20

## 2019-10-07 RX ADMIN — Medication 500 MILLIGRAM(S): at 15:02

## 2019-10-07 RX ADMIN — HYDROMORPHONE HYDROCHLORIDE 0.5 MILLIGRAM(S): 2 INJECTION INTRAMUSCULAR; INTRAVENOUS; SUBCUTANEOUS at 15:04

## 2019-10-07 RX ADMIN — ENOXAPARIN SODIUM 40 MILLIGRAM(S): 100 INJECTION SUBCUTANEOUS at 11:31

## 2019-10-07 NOTE — PROGRESS NOTE ADULT - SUBJECTIVE AND OBJECTIVE BOX
Patient is a 68y old  Female who presents with a chief complaint of Sepsis (07 Oct 2019 14:03)      SUBJECTIVE / OVERNIGHT EVENTS:    Pt cont to c/o nausea and poor appetite. Abd pain is persistent but improved since admission. No diarrhea     Review of Systems:    RESPIRATORY: No cough, wheezing, chills or hemoptysis; No shortness of breath  CARDIOVASCULAR: No chest pain, palpitations, dizziness, or leg swelling  GASTROINTESTINAL: + abdominal  pain. + nausea No vomiting, or hematemesis; No diarrhea or constipation. No melena or hematochezia.      MEDICATIONS  (STANDING):  ciprofloxacin     Tablet 250 milliGRAM(s) Oral every 24 hours  heparin  Injectable 5000 Unit(s) SubCutaneous every 12 hours  metroNIDAZOLE    Tablet 500 milliGRAM(s) Oral every 8 hours  sodium chloride 0.9%. 1000 milliLiter(s) (100 mL/Hr) IV Continuous <Continuous>    MEDICATIONS  (PRN):  HYDROmorphone  Injectable 0.5 milliGRAM(s) IV Push every 6 hours PRN Severe Pain (7 - 10)  metoclopramide Injectable 5 milliGRAM(s) IV Push every 6 hours PRN N/V      PHYSICAL EXAM:  T(C): 36.9 (10-07-19 @ 14:06), Max: 37.1 (10-07-19 @ 05:24)  HR: 73 (10-07-19 @ 14:06) (73 - 86)  BP: 145/78 (10-07-19 @ 14:06) (112/49 - 149/66)  RR: 16 (10-07-19 @ 14:06) (16 - 18)  SpO2: 99% (10-07-19 @ 14:06) (97% - 100%)  I&O's Summary    GENERAL: chronically ill appearing  female lying in bed in NAD   MENTAL STATUS/PSYCH:  AAO x3   HEAD:  Atraumatic, Normocephalic  EYES: EOMI, PERRLA, conjunctiva and sclera clear  NECK: Supple, No elevated JVD  CHEST/LUNG: Clear to auscultation bilaterally; No wheeze  HEART: Regular rate and rhythm; No murmurs, rubs, or gallops  ABDOMEN: Soft, TTP b/l LQs.  Nondistended; Bowel sounds present  EXTREMITIES:  2+ Peripheral Pulses, No clubbing, cyanosis, or edema  NEUROLOGY: CN II-XII grossly intact, moving all extremities  SKIN: No rashes or lesions    LABS:  CAPILLARY BLOOD GLUCOSE                              9.6    6.19  )-----------( 352      ( 07 Oct 2019 06:15 )             29.9     10-07    141  |  106  |  14  ----------------------------<  90  4.1   |  20<L>  |  3.59<H>    Ca    8.3<L>      07 Oct 2019 06:15  Phos  4.9     10-07  Mg     1.7     10-07    TPro  5.2<L>  /  Alb  2.3<L>  /  TBili  0.6  /  DBili  x   /  AST  24  /  ALT  14  /  AlkPhos  152<H>  10-07              RADIOLOGY & ADDITIONAL TESTS:    Imaging Personally Reviewed:    Consultant(s) Notes Reviewed:      Care Discussed with Consultants/Other Providers:

## 2019-10-07 NOTE — PROGRESS NOTE ADULT - SUBJECTIVE AND OBJECTIVE BOX
[facility] DIVISION OF KIDNEY DISEASES AND HYPERTENSION -- FOLLOW UP NOTE  --------------------------------------------------------------------------------    24 hour events/subjective: Patient seen and examined at the bedside. Endorsing nausea and poor PO intake. VSS. Labs notable for rising Scr of 3.59. No documented UOP, but patient endorsing good urination.         PAST HISTORY  --------------------------------------------------------------------------------  No significant changes to PMH, PSH, FHx, SHx, unless otherwise noted    ALLERGIES & MEDICATIONS  --------------------------------------------------------------------------------  Allergies    No Known Allergies    Intolerances      Standing Inpatient Medications  ciprofloxacin     Tablet 250 milliGRAM(s) Oral every 24 hours  enoxaparin Injectable 40 milliGRAM(s) SubCutaneous daily  metroNIDAZOLE    Tablet 500 milliGRAM(s) Oral every 8 hours  sodium chloride 0.9%. 1000 milliLiter(s) IV Continuous <Continuous>    PRN Inpatient Medications  HYDROmorphone  Injectable 0.5 milliGRAM(s) IV Push every 6 hours PRN  metoclopramide Injectable 5 milliGRAM(s) IV Push every 6 hours PRN      REVIEW OF SYSTEMS  --------------------------------------------------------------------------------  Gen: No fevers/chills  Head/Eyes/Ears/Mouth: No headache; Normal hearing; Normal vision    Respiratory: No dyspnea, cough  CV: No chest pain  GI:  + Nausa/vomiting  : No increased frequency, dysuria  MSK: No joint pain/swelling; no edema    All other systems were reviewed and are negative, except as noted.    >>> <<<    VITALS/PHYSICAL EXAM  --------------------------------------------------------------------------------  T(C): 36.9 (10-07-19 @ 09:58), Max: 37.1 (10-07-19 @ 05:24)  HR: 78 (10-07-19 @ 09:58) (74 - 86)  BP: 125/72 (10-07-19 @ 09:58) (112/49 - 149/66)  RR: 18 (10-07-19 @ 09:58) (16 - 18)  SpO2: 99% (10-07-19 @ 09:58) (97% - 100%)  Wt(kg): --        Physical Exam:    	Gen: Frail  	HEENT: Anicteric   	Pulm: CTA B/L  	CV: S1S2  	Abd: Soft, +BS   	Ext: No LE edema B/L  	Neuro: Awake  	Skin: Warm and dry              Psych: appropriate affect      LABS/STUDIES  --------------------------------------------------------------------------------              9.6    6.19  >-----------<  352      [10-07-19 @ 06:15]              29.9     141  |  106  |  14  ----------------------------<  90      [10-07-19 @ 06:15]  4.1   |  20  |  3.59        Ca     8.3     [10-07-19 @ 06:15]      Mg     1.7     [10-07-19 @ 06:15]      Phos  4.9     [10-07-19 @ 06:15]    TPro  5.2  /  Alb  2.3  /  TBili  0.6  /  DBili  x   /  AST  24  /  ALT  14  /  AlkPhos  152  [10-07-19 @ 06:15]          Creatinine Trend:  SCr 3.59 [10-07 @ 06:15]  SCr 3.24 [10-06 @ 06:50]  SCr 2.82 [10-05 @ 06:26]  SCr 2.30 [10-04 @ 21:29]  SCr 1.49 [10-04 @ 05:40]    Urinalysis - [10-05-19 @ 00:00]      Color LIGHT YELLOW / Appearance Lt TURBID / SG 1.009 / pH 6.5      Gluc NEGATIVE / Ketone NEGATIVE  / Bili NEGATIVE / Urobili NORMAL       Blood NEGATIVE / Protein 50 / Leuk Est TRACE / Nitrite NEGATIVE      RBC 11-25 / WBC 11-25 / Hyaline NEGATIVE / Gran  / Sq Epi FEW / Non Sq Epi  / Bacteria NEGATIVE    Urine Creatinine 43.40      [10-05-19 @ 00:00]  Urine Sodium 34      [10-05-19 @ 00:00]  Urine Osmolality 122      [10-05-19 @ 00:00]    Iron 69, TIBC 151, %sat --      [08-09-19 @ 06:24]  Ferritin 686.1      [08-09-19 @ 06:24]  Vitamin D (25OH) 11.7      [08-09-19 @ 06:24]  TSH 0.67      [08-09-19 @ 06:24]

## 2019-10-07 NOTE — PROGRESS NOTE ADULT - PROBLEM SELECTOR PLAN 1
suspect ATN in the setting of sepsis, abx and contrast use   -kidney US no hydro  -c/w IVF and trend Cr

## 2019-10-07 NOTE — PROGRESS NOTE ADULT - PROBLEM SELECTOR PLAN 8
-heparin sc for DVT ppx  -DNR/DNI  -dispo: no PT needs, d/c pending management of SAMREEN, advancement of diet

## 2019-10-07 NOTE — PROGRESS NOTE ADULT - PROBLEM SELECTOR PLAN 2
sepsis d/t colitis, now resolved   -s/p Zosyn x 5 days.  changed to cipro, flagyl- renally dose to complete 14 day course of abx sepsis d/t colitis, now resolved   -s/p Zosyn x 5 days.  will hold for now

## 2019-10-07 NOTE — PROGRESS NOTE ADULT - PROBLEM SELECTOR PLAN 1
Pt. with SAMREEN in the setting of poor oral intake, IV contrast use, vancomycin/zosyn combination. Upon lab review of [facility] Scr WNL since 2016. Scr 0.52 on 9/25/19. Scr WNL at 0.58 on admission 10/2/19 and increase to 1.46 on 10/4/19 with further rise, now 3.59 ( 10/7).   US showed no hydronephrosis. FeNa 1.6%. Pt. with ? ATN. Continue with IVF, encourage PO intake. Monitor UOP and daily weights. Avoid volume depletion, NSAIDs, ARB/ACE-I. Dose medications as per eGFR.

## 2019-10-07 NOTE — PROGRESS NOTE ADULT - PROBLEM SELECTOR PLAN 3
Gemcitabine/Abraxane, started Sept 2019, last dose on 9/25  -Outpt followup with Dr. Tong Epps at [facility]/onc recs noted

## 2019-10-07 NOTE — PROGRESS NOTE ADULT - SUBJECTIVE AND OBJECTIVE BOX
INTERVAL HPI/OVERNIGHT EVENTS:  Patient S&E at bedside.         VITAL SIGNS:  T(F): 98.7 (10-07-19 @ 05:24)  HR: 86 (10-07-19 @ 05:24)  BP: 129/86 (10-07-19 @ 05:24)  RR: 18 (10-07-19 @ 05:24)  SpO2: 100% (10-07-19 @ 05:24)        PHYSICAL EXAM:  Constitutional: NAD  Eyes: EOMI, sclera non-icteric  Neck: supple, no masses, no JVD  Respiratory: CTA b/l, good air entry b/l  Cardiovascular: RRR, no M/R/G  Gastrointestinal: soft, NTND, no masses palpable, + BS, no hepatosplenomegaly  Extremities: no c/c/e  Neurological: AAOx3      MEDICATIONS  (STANDING):  ciprofloxacin     Tablet 250 milliGRAM(s) Oral every 24 hours  enoxaparin Injectable 40 milliGRAM(s) SubCutaneous daily  metroNIDAZOLE    Tablet 500 milliGRAM(s) Oral every 8 hours  sodium chloride 0.9%. 1000 milliLiter(s) (100 mL/Hr) IV Continuous <Continuous>    MEDICATIONS  (PRN):  metoclopramide Injectable 5 milliGRAM(s) IV Push every 6 hours PRN N/V  morphine  - Injectable 2 milliGRAM(s) IV Push every 4 hours PRN Moderate Pain (4 - 6)  morphine  - Injectable 4 milliGRAM(s) IV Push every 4 hours PRN Severe Pain (7 - 10)  ondansetron Injectable 4 milliGRAM(s) IV Push every 6 hours PRN Nausea      Allergies  No Known Allergies        LABS:                        9.6    6.19  )-----------( 352      ( 07 Oct 2019 06:15 )             29.9     10-07    141  |  106  |  14  ----------------------------<  90  4.1   |  20<L>  |  3.59<H>    Ca    8.3<L>      07 Oct 2019 06:15  Phos  4.9     10-07  Mg     1.7     10-07    TPro  5.2<L>  /  Alb  2.3<L>  /  TBili  0.6  /  DBili  x   /  AST  24  /  ALT  14  /  AlkPhos  152<H>  10-07          RADIOLOGY & ADDITIONAL TESTS:  Studies reviewed. INTERVAL HPI/OVERNIGHT EVENTS:  Patient S&E at bedside.   Afebrile.  Still with right-sided abdominal pain. 3  Tolerating liquid diet.         VITAL SIGNS:  T(F): 98.7 (10-07-19 @ 05:24)  HR: 86 (10-07-19 @ 05:24)  BP: 129/86 (10-07-19 @ 05:24)  RR: 18 (10-07-19 @ 05:24)  SpO2: 100% (10-07-19 @ 05:24)        PHYSICAL EXAM:  Constitutional: NAD  Eyes: EOMI, sclera non-icteric  Neck: supple, no masses, no JVD  Respiratory: CTA b/l, good air entry b/l  Cardiovascular: RRR, no M/R/G  Gastrointestinal: soft, tender to palpation on right side of abdomen, no guarding   Extremities: no c/c/e  Neurological: AAOx3      MEDICATIONS  (STANDING):  ciprofloxacin     Tablet 250 milliGRAM(s) Oral every 24 hours  enoxaparin Injectable 40 milliGRAM(s) SubCutaneous daily  metroNIDAZOLE    Tablet 500 milliGRAM(s) Oral every 8 hours  sodium chloride 0.9%. 1000 milliLiter(s) (100 mL/Hr) IV Continuous <Continuous>    MEDICATIONS  (PRN):  metoclopramide Injectable 5 milliGRAM(s) IV Push every 6 hours PRN N/V  morphine  - Injectable 2 milliGRAM(s) IV Push every 4 hours PRN Moderate Pain (4 - 6)  morphine  - Injectable 4 milliGRAM(s) IV Push every 4 hours PRN Severe Pain (7 - 10)  ondansetron Injectable 4 milliGRAM(s) IV Push every 6 hours PRN Nausea      Allergies  No Known Allergies        LABS:                        9.6    6.19  )-----------( 352      ( 07 Oct 2019 06:15 )             29.9     10-07    141  |  106  |  14  ----------------------------<  90  4.1   |  20<L>  |  3.59<H>    Ca    8.3<L>      07 Oct 2019 06:15  Phos  4.9     10-07  Mg     1.7     10-07    TPro  5.2<L>  /  Alb  2.3<L>  /  TBili  0.6  /  DBili  x   /  AST  24  /  ALT  14  /  AlkPhos  152<H>  10-07          RADIOLOGY & ADDITIONAL TESTS:  Studies reviewed. INTERVAL HPI/OVERNIGHT EVENTS:  Patient S&E at bedside.   Afebrile.  Still with right-sided abdominal pain.   Tolerating liquid diet.       VITAL SIGNS:  T(F): 98.7 (10-07-19 @ 05:24)  HR: 86 (10-07-19 @ 05:24)  BP: 129/86 (10-07-19 @ 05:24)  RR: 18 (10-07-19 @ 05:24)  SpO2: 100% (10-07-19 @ 05:24)      PHYSICAL EXAM:  Constitutional: NAD  Eyes: EOMI, sclera non-icteric  Neck: supple, no masses, no JVD  Respiratory: CTA b/l, good air entry b/l  Cardiovascular: RRR, no M/R/G  Gastrointestinal: soft, tender to palpation on right side of abdomen, no guarding   Extremities: no c/c/e  Neurological: AAOx3      MEDICATIONS  (STANDING):  ciprofloxacin     Tablet 250 milliGRAM(s) Oral every 24 hours  enoxaparin Injectable 40 milliGRAM(s) SubCutaneous daily  metroNIDAZOLE    Tablet 500 milliGRAM(s) Oral every 8 hours  sodium chloride 0.9%. 1000 milliLiter(s) (100 mL/Hr) IV Continuous <Continuous>    MEDICATIONS  (PRN):  metoclopramide Injectable 5 milliGRAM(s) IV Push every 6 hours PRN N/V  morphine  - Injectable 2 milliGRAM(s) IV Push every 4 hours PRN Moderate Pain (4 - 6)  morphine  - Injectable 4 milliGRAM(s) IV Push every 4 hours PRN Severe Pain (7 - 10)  ondansetron Injectable 4 milliGRAM(s) IV Push every 6 hours PRN Nausea      Allergies  No Known Allergies        LABS:                        9.6    6.19  )-----------( 352      ( 07 Oct 2019 06:15 )             29.9     10-07    141  |  106  |  14  ----------------------------<  90  4.1   |  20<L>  |  3.59<H>    Ca    8.3<L>      07 Oct 2019 06:15  Phos  4.9     10-07  Mg     1.7     10-07    TPro  5.2<L>  /  Alb  2.3<L>  /  TBili  0.6  /  DBili  x   /  AST  24  /  ALT  14  /  AlkPhos  152<H>  10-07          RADIOLOGY & ADDITIONAL TESTS:  Studies reviewed.

## 2019-10-07 NOTE — PROGRESS NOTE ADULT - ASSESSMENT
68F w/PMH of Stage IV pancreatic adenocarcinoma c/b prior biliary obstruction undergoing active chemo (last treatment 9/25- [facility]), COPD, chronic anemia, HTN, PVD s/p RLE stenting and bypass presenting from home with complaints of weakness, abdominal pain, n/v, a/f sepsis 2/2 cecal colitis vs typhlitis, small bowel ileus w/ probable enteritis on CT A/P, possibly chemo induced with pancytopenia (mild neutropenia), and severe protein calorie malnutrition. Neutropenia resolved and now with normal wbc and plts. Cx NGTD. Course c/b SAMREEN

## 2019-10-07 NOTE — PROGRESS NOTE ADULT - ASSESSMENT
68F w/ Stage IV pancreatic adenocarcinoma (on [place]/Abraxane), presenting from home with abdominal pain, found to have colitis.     # Colitis:   - CT A/P done - showing colitis, no free air   - cpiro/flagyl per primary team for colitis   - still with abdominal pain, but patient states feels better than admission     # SAMREEN:   - SCr 2.59 today  - Nephrology following, thought to be 2/2 poor oral intake, IV contrast use, vancomycin/zosyn combination    # Pancreatic Adenocarcinoma:   - on Gemcitabine/Abraxane, started Sept 2019  - last dose on 9/26  - will follow-up with Dr. Tong Epps as an outpatient at [facility]      Mariposa Mendoza MD  Hematology/Oncology Fellow, PGY-5  pager: [phone]  After 5pm or on weekends, please page the on-call fellow.

## 2019-10-08 DIAGNOSIS — M25.561 PAIN IN RIGHT KNEE: ICD-10-CM

## 2019-10-08 LAB
ANION GAP SERPL CALC-SCNC: 9 MMO/L — SIGNIFICANT CHANGE UP (ref 7–14)
BUN SERPL-MCNC: 14 MG/DL — SIGNIFICANT CHANGE UP (ref 7–23)
CALCIUM SERPL-MCNC: 8 MG/DL — LOW (ref 8.4–10.5)
CHLORIDE SERPL-SCNC: 112 MMOL/L — HIGH (ref 98–107)
CO2 SERPL-SCNC: 19 MMOL/L — LOW (ref 22–31)
CREAT SERPL-MCNC: 3.3 MG/DL — HIGH (ref 0.5–1.3)
GLUCOSE SERPL-MCNC: 118 MG/DL — HIGH (ref 70–99)
HCT VFR BLD CALC: 29.1 % — LOW (ref 34.5–45)
HGB BLD-MCNC: 9.5 G/DL — LOW (ref 11.5–15.5)
MAGNESIUM SERPL-MCNC: 1.5 MG/DL — LOW (ref 1.6–2.6)
MCHC RBC-ENTMCNC: 27.1 PG — SIGNIFICANT CHANGE UP (ref 27–34)
MCHC RBC-ENTMCNC: 32.6 % — SIGNIFICANT CHANGE UP (ref 32–36)
MCV RBC AUTO: 82.9 FL — SIGNIFICANT CHANGE UP (ref 80–100)
NRBC # FLD: 0 K/UL — SIGNIFICANT CHANGE UP (ref 0–0)
PHOSPHATE SERPL-MCNC: 4.1 MG/DL — SIGNIFICANT CHANGE UP (ref 2.5–4.5)
PLATELET # BLD AUTO: 454 K/UL — HIGH (ref 150–400)
PMV BLD: 9.6 FL — SIGNIFICANT CHANGE UP (ref 7–13)
POTASSIUM SERPL-MCNC: 4.4 MMOL/L — SIGNIFICANT CHANGE UP (ref 3.5–5.3)
POTASSIUM SERPL-SCNC: 4.4 MMOL/L — SIGNIFICANT CHANGE UP (ref 3.5–5.3)
RBC # BLD: 3.51 M/UL — LOW (ref 3.8–5.2)
RBC # FLD: 15.9 % — HIGH (ref 10.3–14.5)
SODIUM SERPL-SCNC: 140 MMOL/L — SIGNIFICANT CHANGE UP (ref 135–145)
WBC # BLD: 5.69 K/UL — SIGNIFICANT CHANGE UP (ref 3.8–10.5)
WBC # FLD AUTO: 5.69 K/UL — SIGNIFICANT CHANGE UP (ref 3.8–10.5)

## 2019-10-08 PROCEDURE — 99233 SBSQ HOSP IP/OBS HIGH 50: CPT

## 2019-10-08 RX ORDER — SODIUM CHLORIDE 9 MG/ML
1000 INJECTION, SOLUTION INTRAVENOUS
Refills: 0 | Status: DISCONTINUED | OUTPATIENT
Start: 2019-10-08 | End: 2019-10-09

## 2019-10-08 RX ORDER — MAGNESIUM OXIDE 400 MG ORAL TABLET 241.3 MG
400 TABLET ORAL
Refills: 0 | Status: COMPLETED | OUTPATIENT
Start: 2019-10-08 | End: 2019-10-08

## 2019-10-08 RX ADMIN — HYDROMORPHONE HYDROCHLORIDE 0.5 MILLIGRAM(S): 2 INJECTION INTRAMUSCULAR; INTRAVENOUS; SUBCUTANEOUS at 12:03

## 2019-10-08 RX ADMIN — HYDROMORPHONE HYDROCHLORIDE 0.5 MILLIGRAM(S): 2 INJECTION INTRAMUSCULAR; INTRAVENOUS; SUBCUTANEOUS at 05:50

## 2019-10-08 RX ADMIN — HYDROMORPHONE HYDROCHLORIDE 0.5 MILLIGRAM(S): 2 INJECTION INTRAMUSCULAR; INTRAVENOUS; SUBCUTANEOUS at 20:20

## 2019-10-08 RX ADMIN — SODIUM CHLORIDE 100 MILLILITER(S): 9 INJECTION INTRAMUSCULAR; INTRAVENOUS; SUBCUTANEOUS at 05:35

## 2019-10-08 RX ADMIN — MAGNESIUM OXIDE 400 MG ORAL TABLET 400 MILLIGRAM(S): 241.3 TABLET ORAL at 14:17

## 2019-10-08 RX ADMIN — HYDROMORPHONE HYDROCHLORIDE 0.5 MILLIGRAM(S): 2 INJECTION INTRAMUSCULAR; INTRAVENOUS; SUBCUTANEOUS at 19:24

## 2019-10-08 RX ADMIN — HEPARIN SODIUM 5000 UNIT(S): 5000 INJECTION INTRAVENOUS; SUBCUTANEOUS at 05:28

## 2019-10-08 RX ADMIN — HEPARIN SODIUM 5000 UNIT(S): 5000 INJECTION INTRAVENOUS; SUBCUTANEOUS at 17:27

## 2019-10-08 RX ADMIN — SODIUM CHLORIDE 75 MILLILITER(S): 9 INJECTION, SOLUTION INTRAVENOUS at 13:17

## 2019-10-08 RX ADMIN — HYDROMORPHONE HYDROCHLORIDE 0.5 MILLIGRAM(S): 2 INJECTION INTRAMUSCULAR; INTRAVENOUS; SUBCUTANEOUS at 05:34

## 2019-10-08 RX ADMIN — HYDROMORPHONE HYDROCHLORIDE 0.5 MILLIGRAM(S): 2 INJECTION INTRAMUSCULAR; INTRAVENOUS; SUBCUTANEOUS at 12:17

## 2019-10-08 RX ADMIN — SODIUM CHLORIDE 75 MILLILITER(S): 9 INJECTION, SOLUTION INTRAVENOUS at 21:09

## 2019-10-08 RX ADMIN — MAGNESIUM OXIDE 400 MG ORAL TABLET 400 MILLIGRAM(S): 241.3 TABLET ORAL at 17:27

## 2019-10-08 NOTE — PROGRESS NOTE ADULT - PROBLEM SELECTOR PLAN 7
Nutrition consult appreciated, Ensure Enlive TID added to diet -Holding amlodipine, restart as tolerated

## 2019-10-08 NOTE — PROGRESS NOTE ADULT - PROBLEM SELECTOR PLAN 1
suspect ATN in the setting of sepsis, abx and contrast use. Cr slightly improved today  -kidney US no hydro  -c/w IVF and trend Cr

## 2019-10-08 NOTE — PROGRESS NOTE ADULT - PROBLEM SELECTOR PLAN 3
Gemcitabine/Abraxane, started Sept 2019, last dose on 9/25  -[facility]/onc recs noted sepsis d/t colitis, now resolved   -s/p Zosyn x 5 days.  will hold for now  -advance diet as tolerated

## 2019-10-08 NOTE — PROGRESS NOTE ADULT - PROBLEM SELECTOR PLAN 6
-Holding amlodipine, restart as tolerated Improved  -Serial abdominal exams  -tolerating clears and ADAT

## 2019-10-08 NOTE — PROGRESS NOTE ADULT - ASSESSMENT
68F w/PMH of Stage IV pancreatic adenocarcinoma c/b prior biliary obstruction undergoing active chemo (last treatment 9/25- [facility]), COPD, chronic anemia, HTN, PVD s/p RLE stenting and bypass presenting from home with complaints of weakness, abdominal pain, n/v, a/f sepsis 2/2 cecal colitis vs typhlitis, small bowel ileus w/ probable enteritis on CT A/P, possibly chemo induced with pancytopenia (mild neutropenia), and severe protein calorie malnutrition. Neutropenia resolved and now with normal wbc and plts. Cx NGTD. Course c/b SAMREEN

## 2019-10-08 NOTE — PROGRESS NOTE ADULT - PROBLEM SELECTOR PLAN 8
-heparin sc for DVT ppx  -DNR/DNI  -dispo: no PT needs, d/c pending management of SAMREEN, advancement of diet Nutrition consult appreciated, Ensure Enlive TID added to diet

## 2019-10-08 NOTE — PROGRESS NOTE ADULT - PROBLEM SELECTOR PLAN 2
sepsis d/t colitis, now resolved   -s/p Zosyn x 5 days.  will hold for now  -advance diet as tolerated acute knee pain without signs of inflammation. suspect OA  -obtain R knee XR  -pain control

## 2019-10-08 NOTE — PROGRESS NOTE ADULT - PROBLEM SELECTOR PLAN 4
likely d/t recent chemo, now with normal wbc and plt. continues to have anemia and will trend h/h Gemcitabine/Abraxane, started Sept 2019, last dose on 9/25  -Outpt followup with Dr. Tong Epps at [facility]/onc recs noted

## 2019-10-08 NOTE — PROGRESS NOTE ADULT - PROBLEM SELECTOR PLAN 5
Improved  -Serial abdominal exams  -tolerating clears and ADAT likely d/t recent chemo, now with normal wbc and plt. continues to have anemia and will trend h/h

## 2019-10-08 NOTE — PROGRESS NOTE ADULT - SUBJECTIVE AND OBJECTIVE BOX
Patient is a 68y old  Female who presents with a chief complaint of Sepsis (07 Oct 2019 15:15)      SUBJECTIVE / OVERNIGHT EVENTS:    Pt c/o severe R knee pain since last night. Pain is localized to the posterior R knee radiating to R foot. Pain is sharp, constant, worse with knee flexion and weight bearing. Denies any trauma to knee in the past few days    Review of Systems:    RESPIRATORY: No cough, wheezing, chills or hemoptysis; No shortness of breath  CARDIOVASCULAR: No chest pain, palpitations, dizziness, or leg swelling  GASTROINTESTINAL: No abdominal or epigastric pain. No nausea, vomiting, or hematemesis; No diarrhea or constipation. No melena or hematochezia.      MEDICATIONS  (STANDING):  heparin  Injectable 5000 Unit(s) SubCutaneous every 12 hours  lactated ringers. 1000 milliLiter(s) (75 mL/Hr) IV Continuous <Continuous>  magnesium oxide 400 milliGRAM(s) Oral two times a day with meals    MEDICATIONS  (PRN):  HYDROmorphone  Injectable 0.5 milliGRAM(s) IV Push every 6 hours PRN Severe Pain (7 - 10)  metoclopramide Injectable 5 milliGRAM(s) IV Push every 6 hours PRN N/V      PHYSICAL EXAM:  T(C): 36.4 (10-08-19 @ 12:01), Max: 36.8 (10-08-19 @ 05:18)  HR: 76 (10-08-19 @ 12:45) (60 - 81)  BP: 149/91 (10-08-19 @ 12:45) (122/62 - 164/94)  RR: 18 (10-08-19 @ 12:01) (16 - 18)  SpO2: 96% (10-08-19 @ 12:01) (96% - 100%)  I&O's Summary    GENERAL: chronically ill appearing  female lying in bed in NAD   MENTAL STATUS/PSYCH:  AAO x3   HEAD:  Atraumatic, Normocephalic  EYES: EOMI, PERRLA, conjunctiva and sclera clear  NECK: Supple, No elevated JVD  CHEST/LUNG: Clear to auscultation bilaterally; No wheeze  HEART: Regular rate and rhythm; No murmurs, rubs, or gallops  ABDOMEN: Soft, TTP b/l LQs.  Nondistended; Bowel sounds present  EXTREMITIES:  2+ Peripheral Pulses, No clubbing, cyanosis, or edema. R knee ttp, no effusion, erythema, or warmth  NEUROLOGY: CN II-XII grossly intact, moving all extremities  SKIN: No rashes or lesions      LABS:  CAPILLARY BLOOD GLUCOSE                              9.5    5.69  )-----------( 454      ( 08 Oct 2019 12:05 )             29.1     10-08    140  |  112<H>  |  14  ----------------------------<  118<H>  4.4   |  19<L>  |  3.30<H>    Ca    8.0<L>      08 Oct 2019 12:05  Phos  4.1     10-08  Mg     1.5     10-08    TPro  5.2<L>  /  Alb  2.3<L>  /  TBili  0.6  /  DBili  x   /  AST  24  /  ALT  14  /  AlkPhos  152<H>  10-07              RADIOLOGY & ADDITIONAL TESTS:    Imaging Personally Reviewed:    Consultant(s) Notes Reviewed:      Care Discussed with Consultants/Other Providers:

## 2019-10-09 ENCOUNTER — APPOINTMENT (OUTPATIENT)
Dept: INFUSION THERAPY | Facility: HOSPITAL | Age: 69
End: 2019-10-09

## 2019-10-09 LAB
ANION GAP SERPL CALC-SCNC: 12 MMO/L — SIGNIFICANT CHANGE UP (ref 7–14)
BUN SERPL-MCNC: 13 MG/DL — SIGNIFICANT CHANGE UP (ref 7–23)
CALCIUM SERPL-MCNC: 8.1 MG/DL — LOW (ref 8.4–10.5)
CHLORIDE SERPL-SCNC: 109 MMOL/L — HIGH (ref 98–107)
CO2 SERPL-SCNC: 19 MMOL/L — LOW (ref 22–31)
CREAT SERPL-MCNC: 3.02 MG/DL — HIGH (ref 0.5–1.3)
GLUCOSE SERPL-MCNC: 89 MG/DL — SIGNIFICANT CHANGE UP (ref 70–99)
HCT VFR BLD CALC: 27.6 % — LOW (ref 34.5–45)
HGB BLD-MCNC: 9 G/DL — LOW (ref 11.5–15.5)
MAGNESIUM SERPL-MCNC: 1.6 MG/DL — SIGNIFICANT CHANGE UP (ref 1.6–2.6)
MCHC RBC-ENTMCNC: 26.9 PG — LOW (ref 27–34)
MCHC RBC-ENTMCNC: 32.6 % — SIGNIFICANT CHANGE UP (ref 32–36)
MCV RBC AUTO: 82.6 FL — SIGNIFICANT CHANGE UP (ref 80–100)
NRBC # FLD: 0 K/UL — SIGNIFICANT CHANGE UP (ref 0–0)
PHOSPHATE SERPL-MCNC: 3.6 MG/DL — SIGNIFICANT CHANGE UP (ref 2.5–4.5)
PLATELET # BLD AUTO: 496 K/UL — HIGH (ref 150–400)
PMV BLD: 9.9 FL — SIGNIFICANT CHANGE UP (ref 7–13)
POTASSIUM SERPL-MCNC: 3.8 MMOL/L — SIGNIFICANT CHANGE UP (ref 3.5–5.3)
POTASSIUM SERPL-SCNC: 3.8 MMOL/L — SIGNIFICANT CHANGE UP (ref 3.5–5.3)
RBC # BLD: 3.34 M/UL — LOW (ref 3.8–5.2)
RBC # FLD: 15.9 % — HIGH (ref 10.3–14.5)
SODIUM SERPL-SCNC: 140 MMOL/L — SIGNIFICANT CHANGE UP (ref 135–145)
WBC # BLD: 5.06 K/UL — SIGNIFICANT CHANGE UP (ref 3.8–10.5)
WBC # FLD AUTO: 5.06 K/UL — SIGNIFICANT CHANGE UP (ref 3.8–10.5)

## 2019-10-09 PROCEDURE — 99233 SBSQ HOSP IP/OBS HIGH 50: CPT

## 2019-10-09 RX ORDER — SODIUM CHLORIDE 9 MG/ML
1000 INJECTION, SOLUTION INTRAVENOUS
Refills: 0 | Status: DISCONTINUED | OUTPATIENT
Start: 2019-10-09 | End: 2019-10-11

## 2019-10-09 RX ADMIN — HYDROMORPHONE HYDROCHLORIDE 0.5 MILLIGRAM(S): 2 INJECTION INTRAMUSCULAR; INTRAVENOUS; SUBCUTANEOUS at 13:46

## 2019-10-09 RX ADMIN — SODIUM CHLORIDE 75 MILLILITER(S): 9 INJECTION, SOLUTION INTRAVENOUS at 20:46

## 2019-10-09 RX ADMIN — HEPARIN SODIUM 5000 UNIT(S): 5000 INJECTION INTRAVENOUS; SUBCUTANEOUS at 05:23

## 2019-10-09 RX ADMIN — HYDROMORPHONE HYDROCHLORIDE 0.5 MILLIGRAM(S): 2 INJECTION INTRAMUSCULAR; INTRAVENOUS; SUBCUTANEOUS at 07:47

## 2019-10-09 RX ADMIN — HYDROMORPHONE HYDROCHLORIDE 0.5 MILLIGRAM(S): 2 INJECTION INTRAMUSCULAR; INTRAVENOUS; SUBCUTANEOUS at 21:18

## 2019-10-09 RX ADMIN — HEPARIN SODIUM 5000 UNIT(S): 5000 INJECTION INTRAVENOUS; SUBCUTANEOUS at 17:36

## 2019-10-09 RX ADMIN — HYDROMORPHONE HYDROCHLORIDE 0.5 MILLIGRAM(S): 2 INJECTION INTRAMUSCULAR; INTRAVENOUS; SUBCUTANEOUS at 07:23

## 2019-10-09 RX ADMIN — HYDROMORPHONE HYDROCHLORIDE 0.5 MILLIGRAM(S): 2 INJECTION INTRAMUSCULAR; INTRAVENOUS; SUBCUTANEOUS at 20:44

## 2019-10-09 RX ADMIN — HYDROMORPHONE HYDROCHLORIDE 0.5 MILLIGRAM(S): 2 INJECTION INTRAMUSCULAR; INTRAVENOUS; SUBCUTANEOUS at 14:00

## 2019-10-09 NOTE — PROGRESS NOTE ADULT - SUBJECTIVE AND OBJECTIVE BOX
Patient is a 68y old  Female who presents with a chief complaint of Sepsis (08 Oct 2019 14:28)      SUBJECTIVE / OVERNIGHT EVENTS:    No acute event o/n. Pt offers no new complaint. Still has knee pain but improved.    Review of Systems:    RESPIRATORY: No cough, wheezing, chills or hemoptysis; No shortness of breath  CARDIOVASCULAR: No chest pain, palpitations, dizziness, or leg swelling  GASTROINTESTINAL: No abdominal or epigastric pain. No nausea, vomiting, or hematemesis; No diarrhea or constipation. No melena or hematochezia.      MEDICATIONS  (STANDING):  heparin  Injectable 5000 Unit(s) SubCutaneous every 12 hours  lactated ringers. 1000 milliLiter(s) (75 mL/Hr) IV Continuous <Continuous>    MEDICATIONS  (PRN):  HYDROmorphone  Injectable 0.5 milliGRAM(s) IV Push every 6 hours PRN Severe Pain (7 - 10)  metoclopramide Injectable 5 milliGRAM(s) IV Push every 6 hours PRN N/V      PHYSICAL EXAM:  T(C): 36.6 (10-09-19 @ 13:45), Max: 36.8 (10-09-19 @ 10:31)  HR: 66 (10-09-19 @ 14:05) (58 - 81)  BP: 140/63 (10-09-19 @ 14:05) (140/63 - 155/91)  RR: 16 (10-09-19 @ 14:05) (16 - 18)  SpO2: 99% (10-09-19 @ 14:05) (97% - 99%)  I&O's Summary    08 Oct 2019 07:01  -  09 Oct 2019 07:00  --------------------------------------------------------  IN: 1800 mL / OUT: 0 mL / NET: 1800 mL    09 Oct 2019 07:01  -  09 Oct 2019 15:14  --------------------------------------------------------  IN: 300 mL / OUT: 0 mL / NET: 300 mL      GENERAL: chronically ill appearing  female lying in bed in NAD   MENTAL STATUS/PSYCH:  AAO x3   HEAD:  Atraumatic, Normocephalic  EYES: EOMI, PERRLA, conjunctiva and sclera clear  NECK: Supple, No elevated JVD  CHEST/LUNG: Clear to auscultation bilaterally; No wheeze  HEART: Regular rate and rhythm; No murmurs, rubs, or gallops  ABDOMEN: Soft, TTP b/l LQs.  Nondistended; Bowel sounds present  EXTREMITIES:  2+ Peripheral Pulses, No clubbing, cyanosis, or edema. R knee ttp, no effusion, erythema, or warmth  NEUROLOGY: CN II-XII grossly intact, moving all extremities  SKIN: No rashes or lesions    LABS:  CAPILLARY BLOOD GLUCOSE                              9.0    5.06  )-----------( 496      ( 09 Oct 2019 05:45 )             27.6     10-09    140  |  109<H>  |  13  ----------------------------<  89  3.8   |  19<L>  |  3.02<H>    Ca    8.1<L>      09 Oct 2019 05:45  Phos  3.6     10-09  Mg     1.6     10-09                RADIOLOGY & ADDITIONAL TESTS:    Imaging Personally Reviewed:    Consultant(s) Notes Reviewed:      Care Discussed with Consultants/Other Providers:

## 2019-10-09 NOTE — PROGRESS NOTE ADULT - PROBLEM SELECTOR PLAN 1
suspect ATN in the setting of sepsis, abx and contrast use. Cr improving  -kidney US no hydro  -c/w IVF and trend Cr

## 2019-10-09 NOTE — PROGRESS NOTE ADULT - PROBLEM SELECTOR PLAN 4
Gemcitabine/Abraxane, started Sept 2019, last dose on 9/25  -Outpt followup with Dr. Tong Epps at [facility]/onc recs noted

## 2019-10-09 NOTE — CHART NOTE - NSCHARTNOTEFT_GEN_A_CORE
Source: Patient [x] Medical record reviewed. Patient seen for malnutrition/length of stay nutrition follow-up. Patient reports fair appetite/PO intake. Overall consuming 25-50% of meals. Reports difficulty swallowing some foods. States she will chew food for awhile and then feel as though she cannot swallow so she spits it out. Patient normally wears dentures which she does not have in-house, unsure if this could play a role in swallowing issue as patient may not chew food enough. States she tolerated cornflakes with milk this morning but had some difficulty with home fries. Denies any difficulty with swallowing thin liquids. Drinking 2-3 Ensure Enlive supplements per day. Reports ongoing nausea but states it has improved since admission. No vomiting. No diarrhea/constipation.     Current Diet : Diet, Regular:   Supplement Feeding Modality:  Oral  Ensure Enlive Cans or Servings Per Day:  1       Frequency:  Three Times a day (10-08-19 @ 11:31)  Reported:  [x] nausea  [ ] vomiting [ ] diarrhea [ ] constipation  [ ]chewing problems [x] swallowing issues  [ ] other:   PO intake:  < 50% [x] 50-75% [ ]   % [ ]  other :  Current Weight: 71kg (10/09, taken via bed-scale at time of visit), 61.6kg (10/02)  - Noted weight shift - likely bed-scale error    __________________ Pertinent Medications__________________   MEDICATIONS  (STANDING):  heparin  Injectable 5000 Unit(s) SubCutaneous every 12 hours  lactated ringers. 1000 milliLiter(s) (75 mL/Hr) IV Continuous <Continuous>    MEDICATIONS  (PRN):  HYDROmorphone  Injectable 0.5 milliGRAM(s) IV Push every 6 hours PRN Severe Pain (7 - 10)  metoclopramide Injectable 5 milliGRAM(s) IV Push every 6 hours PRN N/V    __________________ Pertinent Labs__________________   10-09 Na140 mmol/L Glu 89 mg/dL K+ 3.8 mmol/L Cr  3.02 mg/dL<H> BUN 13 mg/dL 10-09 Phos 3.6 mg/dL 10-07 Alb 2.3 g/dL<L>    Skin: No pressure ulcers/DTI noted in flowsheets.   Edema: 1+ right foot     Estimated Needs:   [x] no change since previous assessment  [ ] recalculated:     Previous Nutrition Diagnosis:   [x] Malnutrition, severe  Nutrition Diagnosis is [x] ongoing  [ ] resolved [ ] not applicable     Goal(s):  1. Patient to meet > 75% estimated pro/kcal needs.   2. Tolerance to diet.    Interventions:   1. Recommend bedside swallow evaluation by SLP to determine the appropriate food and beverage consistency.   2. In the interim, recommend to change to Mechanical Soft diet.   3. Continue Ensure Enlive 240mls 3x daily (1050kcal, 60g protein).  4. Please Encourage po intake, assist with meals and menu selections, maintain aspiration precautions.   5. Continue anti-nausea medication PRN.     Monitoring and Evaluation:  1. Monitor weights, labs, BM's, skin integrity, p.o. intake.  2. Follow-up per protocol.   RD to remain available, Yady Sanchez RD, CDN Pager #[phone]

## 2019-10-09 NOTE — PROGRESS NOTE ADULT - PROBLEM SELECTOR PLAN 3
sepsis d/t colitis, now resolved   -s/p Zosyn x 5 days.  will hold for now  -advance diet as tolerated

## 2019-10-09 NOTE — PROGRESS NOTE ADULT - ASSESSMENT
68F w/PMH of Stage IV pancreatic adenocarcinoma c/b prior biliary obstruction undergoing active chemo (last treatment 9/25- [facility]), COPD, chronic anemia, HTN, PVD s/p RLE stenting and bypass presenting from home with complaints of weakness, abdominal pain, n/v, a/f sepsis 2/2 cecal colitis vs typhlitis, small bowel ileus w/ probable enteritis on CT A/P, possibly chemo induced with pancytopenia (mild neutropenia), and severe protein calorie malnutrition. Neutropenia resolved and now with normal wbc and plts. Cx NGTD. Course c/b SAMREEN

## 2019-10-10 ENCOUNTER — TRANSCRIPTION ENCOUNTER (OUTPATIENT)
Age: 69
End: 2019-10-10

## 2019-10-10 LAB
ANION GAP SERPL CALC-SCNC: 8 MMO/L — SIGNIFICANT CHANGE UP (ref 7–14)
BUN SERPL-MCNC: 13 MG/DL — SIGNIFICANT CHANGE UP (ref 7–23)
CALCIUM SERPL-MCNC: 8.2 MG/DL — LOW (ref 8.4–10.5)
CHLORIDE SERPL-SCNC: 108 MMOL/L — HIGH (ref 98–107)
CO2 SERPL-SCNC: 21 MMOL/L — LOW (ref 22–31)
CREAT SERPL-MCNC: 2.78 MG/DL — HIGH (ref 0.5–1.3)
GLUCOSE SERPL-MCNC: 92 MG/DL — SIGNIFICANT CHANGE UP (ref 70–99)
HCT VFR BLD CALC: 28.9 % — LOW (ref 34.5–45)
HGB BLD-MCNC: 9.4 G/DL — LOW (ref 11.5–15.5)
MAGNESIUM SERPL-MCNC: 1.6 MG/DL — SIGNIFICANT CHANGE UP (ref 1.6–2.6)
MCHC RBC-ENTMCNC: 26.9 PG — LOW (ref 27–34)
MCHC RBC-ENTMCNC: 32.5 % — SIGNIFICANT CHANGE UP (ref 32–36)
MCV RBC AUTO: 82.6 FL — SIGNIFICANT CHANGE UP (ref 80–100)
NRBC # FLD: 0 K/UL — SIGNIFICANT CHANGE UP (ref 0–0)
PLATELET # BLD AUTO: 579 K/UL — HIGH (ref 150–400)
PMV BLD: 9.6 FL — SIGNIFICANT CHANGE UP (ref 7–13)
POTASSIUM SERPL-MCNC: 3.7 MMOL/L — SIGNIFICANT CHANGE UP (ref 3.5–5.3)
POTASSIUM SERPL-SCNC: 3.7 MMOL/L — SIGNIFICANT CHANGE UP (ref 3.5–5.3)
RBC # BLD: 3.5 M/UL — LOW (ref 3.8–5.2)
RBC # FLD: 16.4 % — HIGH (ref 10.3–14.5)
SODIUM SERPL-SCNC: 137 MMOL/L — SIGNIFICANT CHANGE UP (ref 135–145)
WBC # BLD: 5.51 K/UL — SIGNIFICANT CHANGE UP (ref 3.8–10.5)
WBC # FLD AUTO: 5.51 K/UL — SIGNIFICANT CHANGE UP (ref 3.8–10.5)

## 2019-10-10 PROCEDURE — 99233 SBSQ HOSP IP/OBS HIGH 50: CPT

## 2019-10-10 PROCEDURE — 73562 X-RAY EXAM OF KNEE 3: CPT | Mod: 26,RT

## 2019-10-10 RX ORDER — HYDROMORPHONE HYDROCHLORIDE 2 MG/ML
0.5 INJECTION INTRAMUSCULAR; INTRAVENOUS; SUBCUTANEOUS EVERY 6 HOURS
Refills: 0 | Status: DISCONTINUED | OUTPATIENT
Start: 2019-10-10 | End: 2019-10-11

## 2019-10-10 RX ADMIN — HYDROMORPHONE HYDROCHLORIDE 0.5 MILLIGRAM(S): 2 INJECTION INTRAMUSCULAR; INTRAVENOUS; SUBCUTANEOUS at 18:44

## 2019-10-10 RX ADMIN — HEPARIN SODIUM 5000 UNIT(S): 5000 INJECTION INTRAVENOUS; SUBCUTANEOUS at 05:12

## 2019-10-10 RX ADMIN — SODIUM CHLORIDE 75 MILLILITER(S): 9 INJECTION, SOLUTION INTRAVENOUS at 17:12

## 2019-10-10 RX ADMIN — HYDROMORPHONE HYDROCHLORIDE 0.5 MILLIGRAM(S): 2 INJECTION INTRAMUSCULAR; INTRAVENOUS; SUBCUTANEOUS at 11:16

## 2019-10-10 RX ADMIN — HYDROMORPHONE HYDROCHLORIDE 0.5 MILLIGRAM(S): 2 INJECTION INTRAMUSCULAR; INTRAVENOUS; SUBCUTANEOUS at 05:31

## 2019-10-10 RX ADMIN — SODIUM CHLORIDE 75 MILLILITER(S): 9 INJECTION, SOLUTION INTRAVENOUS at 18:18

## 2019-10-10 RX ADMIN — HYDROMORPHONE HYDROCHLORIDE 0.5 MILLIGRAM(S): 2 INJECTION INTRAMUSCULAR; INTRAVENOUS; SUBCUTANEOUS at 11:45

## 2019-10-10 RX ADMIN — HYDROMORPHONE HYDROCHLORIDE 0.5 MILLIGRAM(S): 2 INJECTION INTRAMUSCULAR; INTRAVENOUS; SUBCUTANEOUS at 18:08

## 2019-10-10 RX ADMIN — HYDROMORPHONE HYDROCHLORIDE 0.5 MILLIGRAM(S): 2 INJECTION INTRAMUSCULAR; INTRAVENOUS; SUBCUTANEOUS at 05:09

## 2019-10-10 RX ADMIN — HEPARIN SODIUM 5000 UNIT(S): 5000 INJECTION INTRAVENOUS; SUBCUTANEOUS at 18:08

## 2019-10-10 NOTE — DISCHARGE NOTE PROVIDER - HOSPITAL COURSE
68F w/PMH of Stage IV pancreatic adenocarcinoma c/b prior biliary obstruction undergoing active chemo (last treatment 9/25- [facility]), COPD, chronic anemia, HTN, PVD s/p RLE stenting and bypass presenting from home with complaints of weakness, abdominal pain, n/v, a/f sepsis 2/2 cecal colitis vs typhlitis, small bowel ileus w/ probable enteritis on CT A/P, possibly chemo induced with pancytopenia (mild neutropenia), and severe protein calorie malnutrition. Neutropenia resolved and now with normal wbc and plts. Cx NGTD. Course c/b SAMREEN        SAMREEN    - IVF     - house renal cs: in the setting of poor oral intake, IV contrast use, vancomycin/zosyn combination.    - Bladder scan to r/o obstruction w/ 75 cc    - Renal U/S - Mild increase renal echogenicity suggestive of underlying renal parenchymal disease. No hydronephrosis.        Sepsis ---> colitis    - CT w/cecal colitis and small bowel ileus w/probable enteritis, likely due to recent chemotherapy. no neutropenia     - Zosyn ---->  Cipro/flagyl x7d  - completed     - Blood Cx NGTD     - Stool cx neg     - GI stool PCR- negative        Pancreatic adenocarcinoma    - Gemcitabine/Abraxane, started Sept 2019, last dose on 9/26    - Outpt followup with Dr. Tong Epps at [facility]    - Heme/onc cs        Pancytopenia      - Likely induced by recent chemotherapy +/- acute infection. ANC currently >500. No signs of active bleeding on exam    - Improving, trend         Ileus    - Improved    - Serial abdominal exams    - Monitor for BMs        HTN    - Soft BP    - Holding amlodipine, restart as tolerated        Severe protein-calorie malnutrition    - Nutrition consult, Ensure Enlive TID added to diet         PMD office, Dr. Robles [phone] aware hospitalization 68F w/PMH of Stage IV pancreatic adenocarcinoma c/b prior biliary obstruction undergoing active chemo (last treatment 9/25- [facility]), COPD, chronic anemia, HTN, PVD s/p RLE stenting and bypass presenting from home with complaints of weakness, abdominal pain, n/v, a/f sepsis 2/2 cecal colitis vs typhlitis, small bowel ileus w/ probable enteritis on CT A/P, possibly chemo induced with pancytopenia (mild neutropenia), and severe protein calorie malnutrition. Neutropenia resolved and now with normal wbc and plts. Cx NGTD. Course c/b SAMREEN        SAMREEN    - IVF     - house renal cs: in the setting of poor oral intake, IV contrast use, vancomycin/zosyn combination.    - Bladder scan to r/o obstruction w/ 75 cc    - Renal U/S - Mild increase renal echogenicity suggestive of underlying renal parenchymal disease. No hydronephrosis.        Sepsis ---> colitis    - CT w/cecal colitis and small bowel ileus w/probable enteritis, likely due to recent chemotherapy. no neutropenia     - Zosyn ---->  Cipro/flagyl x7d  - completed     - Blood Cx NGTD     - Stool cx neg     - GI stool PCR- negative        Pancreatic adenocarcinoma    - Gemcitabine/Abraxane, started Sept 2019, last dose on 9/26    - Outpt [facility] with [facility]    - Heme/onc cs        Pancytopenia      - Likely induced by recent chemotherapy +/- acute infection. ANC currently >500. No signs of active bleeding on exam    - Improving, trend         Ileus    - Improved    - Serial abdominal exams    - Monitor for BMs        HTN    - Soft BP    - Holding amlodipine, restart as tolerated        Severe protein-calorie malnutrition    - Nutrition consult, Ensure Enlive TID added to diet         PMD office, Dr. Robles [phone] aware hospitalization. Patient stable and cleared for discharge, d/w Dr. Perrin.

## 2019-10-10 NOTE — DISCHARGE NOTE PROVIDER - NSDCCPCAREPLAN_GEN_ALL_CORE_FT
PRINCIPAL DISCHARGE DIAGNOSIS  Diagnosis: Colitis  Assessment and Plan of Treatment: You completed antibiotic in hospital.      SECONDARY DISCHARGE DIAGNOSES  Diagnosis: Acute kidney injury  Assessment and Plan of Treatment: Multifactorial. You were given hydration in hospital.    Diagnosis: Essential hypertension  Assessment and Plan of Treatment: Blood pressure stable. Norvasc --- resume or continue to hold?    Diagnosis: Severe protein-calorie malnutrition  Assessment and Plan of Treatment: Mechanical soft diet with thin liquids. Continue ensure 3x a day.    Diagnosis: Acute pain of right knee  Assessment and Plan of Treatment: xray showed ----    Diagnosis: Pancreatic adenocarcinoma  Assessment and Plan of Treatment: Follow up with Dr Epps. PRINCIPAL DISCHARGE DIAGNOSIS  Diagnosis: Colitis  Assessment and Plan of Treatment: You completed antibiotic in hospital.      SECONDARY DISCHARGE DIAGNOSES  Diagnosis: Acute pain of right knee  Assessment and Plan of Treatment: xray showed osteoarthritis of knee, no dislocation or fracture.    Diagnosis: Severe protein-calorie malnutrition  Assessment and Plan of Treatment: Mechanical soft diet with thin liquids. Continue ensure 3x a day.    Diagnosis: Essential hypertension  Assessment and Plan of Treatment: Blood pressure stable. Norvasc discontinued while in hospital.    Diagnosis: Pancreatic adenocarcinoma  Assessment and Plan of Treatment: Follow up with Dr Epps at [facility]    Diagnosis: Acute kidney injury  Assessment and Plan of Treatment: Multifactorial. You were given hydration in hospital and your kidney function imporved, maintain yourself well hydrated.

## 2019-10-10 NOTE — SWALLOW BEDSIDE ASSESSMENT ADULT - COMMENTS
Patient is a "67 yo F former smoker, HTN, anemia, PVD, claudication RLE s/p stent, stage IV pancreatic cancer on chemotherapy, presenting with one day abdominal pain, nausea and two episodes non-bloody diarrhea". CT of Abdomen completed on 10/2/19 reported "partially imaged right middle lobe nodule, additional unchanged pulmonary nodule of left lower lobe". As per chart review, patient reports difficulty chewing solids and was downgraded from regular solids to mechanical soft solids.     Patient was seen upright at bedside. Patient was alert/awake and easily engaged in conversation. Patient was able to follow simple directions. As per patient report, patient currently edentulous and uses full upper and lower dentures at home during PO consumption. Patient reports difficulty with solids (i.e. meat) and reports it takes her "a while to chew". c/o pharyngeal stasis, though reports that it resolved after liquid wash

## 2019-10-10 NOTE — PROGRESS NOTE ADULT - SUBJECTIVE AND OBJECTIVE BOX
Patient is a 68y old  Female who presents with a chief complaint of Sepsis (09 Oct 2019 15:14)      SUBJECTIVE / OVERNIGHT EVENTS:    Pt cont to report on/off b/l LQ abd pain. Endorses poor appetite. Denies diarrhea     Review of Systems:    RESPIRATORY: No cough, wheezing, chills or hemoptysis; No shortness of breath  CARDIOVASCULAR: No chest pain, palpitations, dizziness, or leg swelling  GASTROINTESTINAL: + abdominal  pain. No nausea, vomiting, or hematemesis; No diarrhea or constipation. No melena or hematochezia.      MEDICATIONS  (STANDING):  heparin  Injectable 5000 Unit(s) SubCutaneous every 12 hours  lactated ringers. 1000 milliLiter(s) (75 mL/Hr) IV Continuous <Continuous>    MEDICATIONS  (PRN):  HYDROmorphone  Injectable 0.5 milliGRAM(s) IV Push every 6 hours PRN Severe Pain (7 - 10)  metoclopramide Injectable 5 milliGRAM(s) IV Push every 6 hours PRN N/V      PHYSICAL EXAM:  T(C): 36.3 (10-10-19 @ 14:03), Max: 36.8 (10-10-19 @ 05:16)  HR: 75 (10-10-19 @ 14:03) (67 - 89)  BP: 137/86 (10-10-19 @ 14:03) (137/86 - 167/87)  RR: 16 (10-10-19 @ 14:03) (16 - 18)  SpO2: 100% (10-10-19 @ 14:03) (96% - 100%)  I&O's Summary    09 Oct 2019 07:01  -  10 Oct 2019 07:00  --------------------------------------------------------  IN: 1800 mL / OUT: 500 mL / NET: 1300 mL      GENERAL: chronically ill appearing  female lying in bed in NAD   MENTAL STATUS/PSYCH:  AAO x3   HEAD:  Atraumatic, Normocephalic  EYES: EOMI, PERRLA, conjunctiva and sclera clear  NECK: Supple, No elevated JVD  CHEST/LUNG: Clear to auscultation bilaterally; No wheeze  HEART: Regular rate and rhythm; No murmurs, rubs, or gallops  ABDOMEN: Soft, TTP b/l LQs.  Nondistended; Bowel sounds present  EXTREMITIES:  2+ Peripheral Pulses, No clubbing, cyanosis, or edema. R knee ttp, no effusion, erythema, or warmth  NEUROLOGY: CN II-XII grossly intact, moving all extremities  SKIN: No rashes or lesions    LABS:  CAPILLARY BLOOD GLUCOSE                              9.4    5.51  )-----------( 579      ( 10 Oct 2019 05:45 )             28.9     10-10    137  |  108<H>  |  13  ----------------------------<  92  3.7   |  21<L>  |  2.78<H>    Ca    8.2<L>      10 Oct 2019 05:45  Phos  3.6     10-09  Mg     1.6     10-10                RADIOLOGY & ADDITIONAL TESTS:    Imaging Personally Reviewed:    Consultant(s) Notes Reviewed:      Care Discussed with Consultants/Other Providers:

## 2019-10-10 NOTE — SWALLOW BEDSIDE ASSESSMENT ADULT - SWALLOW EVAL: RECOMMENDED FEEDING/EATING TECHNIQUES
maintain upright posture during/after eating for 30 mins/no straws/position upright (90 degrees)/alternate food with liquid/small sips/bites

## 2019-10-10 NOTE — PROGRESS NOTE ADULT - PROBLEM SELECTOR PLAN 4
Gemcitabine/Abraxane, started Sept 2019, last dose on 9/25  -Outpt followup with Dr. Tong Epps at [facility]/onc recs noted

## 2019-10-10 NOTE — SWALLOW BEDSIDE ASSESSMENT ADULT - ASR SWALLOW ASPIRATION MONITOR
change of breathing pattern/position upright (90Y)/gurgly voice/cough/upper respiratory infection/oral hygiene/pneumonia/fever/throat clearing

## 2019-10-10 NOTE — DISCHARGE NOTE PROVIDER - CARE PROVIDER_API CALL
Attempted to give morning meds - pt sleeping at this time Tong Epps)  Hematology; Medical Oncology  [address] [place] [address]  Phone: [phone]  Fax: [phone]  Follow Up Time:

## 2019-10-10 NOTE — PROGRESS NOTE ADULT - PROBLEM SELECTOR PLAN 2
acute knee pain without signs of inflammation. suspect OA. now resolved  -pending R knee XR  -pain control

## 2019-10-10 NOTE — DISCHARGE NOTE PROVIDER - NSDCFUSCHEDAPPT_GEN_ALL_CORE_FT
BROWN, OCHOA ; 10/14/2019 ; [facility] [id number]  BROWN, VENUS ; 10/16/2019 ; [facility]  BROWN, OCHOA ; 10/18/2019 ; [facility]  BROWN, OCHOA ; 10/23/2019 ; [facility]  BROWN, OCHOA ; 11/06/2019 ; [facility]  BROWN, OCHOA ; 11/13/2019 ; [facility]  BROWN, OCHOA ; 11/20/2019 ; [facility] BROWN, OCHOA ; 10/16/2019 ; [facility]  BROWN, OCHOA ; 10/18/2019 ; [facility]  BROWN, OCHOA ; 10/23/2019 ; [facility]  BROWN, OCHOA ; 11/06/2019 ; [facility]  BROWN, OCHOA ; 11/13/2019 ; [facility]  BROWN, OCHOA ; 11/20/2019 ; [facility]

## 2019-10-10 NOTE — PROGRESS NOTE ADULT - ASSESSMENT
68F w/PMH of Stage IV pancreatic adenocarcinoma c/b prior biliary obstruction undergoing active chemo (last treatment 9/25- [facility]), COPD, chronic anemia, HTN, PVD s/p RLE stenting and bypass presenting from home with complaints of weakness, abdominal pain, n/v, a/f sepsis 2/2 cecal colitis vs typhlitis, small bowel ileus w/ probable enteritis on CT A/P, possibly chemo induced with pancytopenia (mild neutropenia), and severe protein calorie malnutrition. Neutropenia resolved and now with normal wbc and plts. Cx NGTD. Course c/b SAMREEN

## 2019-10-10 NOTE — SWALLOW BEDSIDE ASSESSMENT ADULT - SWALLOW EVAL: DIAGNOSIS
1. Functional oral phase for puree consistency, mechanical soft solids and thin liquids marked by adequate mastication for solids, adequate bolus manipulation and adequate oral transit time 2. Mild oral phase dysphagia for regular solids extended/prolonged mastication and delay in oral transit time exacerbated by lack of dentition 3. Functional pharyngeal phase for all consistencies presented marked by adequate laryngeal elevation upon digital palpation and NO overt s/s of laryngeal penetration/aspiration. For regular solids, patient reported pharyngeal stasis though reports clearance when utilizing liquid wash.

## 2019-10-11 LAB
ALBUMIN SERPL ELPH-MCNC: 2.4 G/DL — LOW (ref 3.3–5)
ALP SERPL-CCNC: 162 U/L — HIGH (ref 40–120)
ALT FLD-CCNC: 17 U/L — SIGNIFICANT CHANGE UP (ref 4–33)
ANION GAP SERPL CALC-SCNC: 13 MMO/L — SIGNIFICANT CHANGE UP (ref 7–14)
AST SERPL-CCNC: 33 U/L — HIGH (ref 4–32)
BILIRUB SERPL-MCNC: 0.6 MG/DL — SIGNIFICANT CHANGE UP (ref 0.2–1.2)
BUN SERPL-MCNC: 13 MG/DL — SIGNIFICANT CHANGE UP (ref 7–23)
CALCIUM SERPL-MCNC: 8.4 MG/DL — SIGNIFICANT CHANGE UP (ref 8.4–10.5)
CHLORIDE SERPL-SCNC: 107 MMOL/L — SIGNIFICANT CHANGE UP (ref 98–107)
CO2 SERPL-SCNC: 17 MMOL/L — LOW (ref 22–31)
CREAT SERPL-MCNC: 2.68 MG/DL — HIGH (ref 0.5–1.3)
GLUCOSE SERPL-MCNC: 102 MG/DL — HIGH (ref 70–99)
HCT VFR BLD CALC: 33.2 % — LOW (ref 34.5–45)
HGB BLD-MCNC: 10.8 G/DL — LOW (ref 11.5–15.5)
MAGNESIUM SERPL-MCNC: 1.6 MG/DL — SIGNIFICANT CHANGE UP (ref 1.6–2.6)
MCHC RBC-ENTMCNC: 26.9 PG — LOW (ref 27–34)
MCHC RBC-ENTMCNC: 32.5 % — SIGNIFICANT CHANGE UP (ref 32–36)
MCV RBC AUTO: 82.8 FL — SIGNIFICANT CHANGE UP (ref 80–100)
NRBC # FLD: 0 K/UL — SIGNIFICANT CHANGE UP (ref 0–0)
PHOSPHATE SERPL-MCNC: 3.5 MG/DL — SIGNIFICANT CHANGE UP (ref 2.5–4.5)
PLATELET # BLD AUTO: 623 K/UL — HIGH (ref 150–400)
PMV BLD: 10.4 FL — SIGNIFICANT CHANGE UP (ref 7–13)
POTASSIUM SERPL-MCNC: 4.1 MMOL/L — SIGNIFICANT CHANGE UP (ref 3.5–5.3)
POTASSIUM SERPL-SCNC: 4.1 MMOL/L — SIGNIFICANT CHANGE UP (ref 3.5–5.3)
PROT SERPL-MCNC: 5.9 G/DL — LOW (ref 6–8.3)
RBC # BLD: 4.01 M/UL — SIGNIFICANT CHANGE UP (ref 3.8–5.2)
RBC # FLD: 17 % — HIGH (ref 10.3–14.5)
SODIUM SERPL-SCNC: 137 MMOL/L — SIGNIFICANT CHANGE UP (ref 135–145)
WBC # BLD: 6.09 K/UL — SIGNIFICANT CHANGE UP (ref 3.8–10.5)
WBC # FLD AUTO: 6.09 K/UL — SIGNIFICANT CHANGE UP (ref 3.8–10.5)

## 2019-10-11 PROCEDURE — 99233 SBSQ HOSP IP/OBS HIGH 50: CPT

## 2019-10-11 RX ORDER — OXYCODONE HYDROCHLORIDE 5 MG/1
5 TABLET ORAL ONCE
Refills: 0 | Status: DISCONTINUED | OUTPATIENT
Start: 2019-10-11 | End: 2019-10-11

## 2019-10-11 RX ORDER — HYDROMORPHONE HYDROCHLORIDE 2 MG/ML
2 INJECTION INTRAMUSCULAR; INTRAVENOUS; SUBCUTANEOUS EVERY 4 HOURS
Refills: 0 | Status: DISCONTINUED | OUTPATIENT
Start: 2019-10-11 | End: 2019-10-14

## 2019-10-11 RX ORDER — HYDROMORPHONE HYDROCHLORIDE 2 MG/ML
0.5 INJECTION INTRAMUSCULAR; INTRAVENOUS; SUBCUTANEOUS ONCE
Refills: 0 | Status: DISCONTINUED | OUTPATIENT
Start: 2019-10-11 | End: 2019-10-11

## 2019-10-11 RX ORDER — SODIUM CHLORIDE 9 MG/ML
1000 INJECTION, SOLUTION INTRAVENOUS
Refills: 0 | Status: COMPLETED | OUTPATIENT
Start: 2019-10-11 | End: 2019-10-12

## 2019-10-11 RX ADMIN — HEPARIN SODIUM 5000 UNIT(S): 5000 INJECTION INTRAVENOUS; SUBCUTANEOUS at 18:19

## 2019-10-11 RX ADMIN — OXYCODONE HYDROCHLORIDE 5 MILLIGRAM(S): 5 TABLET ORAL at 07:00

## 2019-10-11 RX ADMIN — OXYCODONE HYDROCHLORIDE 5 MILLIGRAM(S): 5 TABLET ORAL at 05:20

## 2019-10-11 RX ADMIN — HYDROMORPHONE HYDROCHLORIDE 2 MILLIGRAM(S): 2 INJECTION INTRAMUSCULAR; INTRAVENOUS; SUBCUTANEOUS at 15:10

## 2019-10-11 RX ADMIN — HYDROMORPHONE HYDROCHLORIDE 2 MILLIGRAM(S): 2 INJECTION INTRAMUSCULAR; INTRAVENOUS; SUBCUTANEOUS at 14:14

## 2019-10-11 RX ADMIN — HYDROMORPHONE HYDROCHLORIDE 0.5 MILLIGRAM(S): 2 INJECTION INTRAMUSCULAR; INTRAVENOUS; SUBCUTANEOUS at 18:14

## 2019-10-11 RX ADMIN — HEPARIN SODIUM 5000 UNIT(S): 5000 INJECTION INTRAVENOUS; SUBCUTANEOUS at 05:16

## 2019-10-11 RX ADMIN — HYDROMORPHONE HYDROCHLORIDE 0.5 MILLIGRAM(S): 2 INJECTION INTRAMUSCULAR; INTRAVENOUS; SUBCUTANEOUS at 18:30

## 2019-10-11 RX ADMIN — SODIUM CHLORIDE 75 MILLILITER(S): 9 INJECTION, SOLUTION INTRAVENOUS at 18:20

## 2019-10-11 RX ADMIN — SODIUM CHLORIDE 75 MILLILITER(S): 9 INJECTION, SOLUTION INTRAVENOUS at 01:03

## 2019-10-11 RX ADMIN — Medication 5 MILLIGRAM(S): at 18:13

## 2019-10-11 NOTE — PROGRESS NOTE ADULT - PROBLEM SELECTOR PLAN 2
acute knee pain without signs of inflammation. suspect OA. now resolved  - R knee XR no fx   -pain control

## 2019-10-11 NOTE — PROGRESS NOTE ADULT - ASSESSMENT
68F w/PMH of Stage IV pancreatic adenocarcinoma c/b prior biliary obstruction undergoing active chemo (last treatment 9/25- [facility]), COPD, chronic anemia, HTN, PVD s/p RLE stenting and bypass presenting from home with complaints of weakness, abdominal pain, n/v, a/f sepsis 2/2 cecal colitis vs typhlitis, small bowel ileus w/ probable enteritis on CT A/P, possibly chemo induced with pancytopenia (mild neutropenia), and severe protein calorie malnutrition. Neutropenia resolved and now with normal wbc and plts. Cx NGTD. Course c/b SAMREEN

## 2019-10-11 NOTE — PROGRESS NOTE ADULT - PROBLEM SELECTOR PLAN 4
Gemcitabine/Abraxane, started Sept 2019, last dose on 9/25  -Outpt followup with Dr. Tong Epps at [facility]/onc recs noted

## 2019-10-11 NOTE — PROGRESS NOTE ADULT - SUBJECTIVE AND OBJECTIVE BOX
Patient is a 68y old  Female who presents with a chief complaint of Sepsis (10 Oct 2019 14:53)      SUBJECTIVE / OVERNIGHT EVENTS:    Pt cont to c/o on and off abd pain. eating well, no diarrhea     Review of Systems:    RESPIRATORY: No cough, wheezing, chills or hemoptysis; No shortness of breath  CARDIOVASCULAR: No chest pain, palpitations, dizziness, or leg swelling  GASTROINTESTINAL: No abdominal or epigastric pain. No nausea, vomiting, or hematemesis; No diarrhea or constipation. No melena or hematochezia.      MEDICATIONS  (STANDING):  heparin  Injectable 5000 Unit(s) SubCutaneous every 12 hours  lactated ringers. 1000 milliLiter(s) (75 mL/Hr) IV Continuous <Continuous>    MEDICATIONS  (PRN):  HYDROmorphone   Tablet 2 milliGRAM(s) Oral every 4 hours PRN Severe Pain (7 - 10)  metoclopramide Injectable 5 milliGRAM(s) IV Push every 6 hours PRN N/V      PHYSICAL EXAM:  T(C): 37.2 (10-11-19 @ 14:33), Max: 37.2 (10-11-19 @ 05:15)  HR: 83 (10-11-19 @ 14:33) (74 - 92)  BP: 151/76 (10-11-19 @ 14:33) (149/73 - 151/84)  RR: 16 (10-11-19 @ 14:33) (16 - 18)  SpO2: 99% (10-11-19 @ 14:33) (97% - 99%)  I&O's Summary    10 Oct 2019 07:01  -  11 Oct 2019 07:00  --------------------------------------------------------  IN: 675 mL / OUT: 900 mL / NET: -225 mL      GENERAL: chronically ill female lying in bed in NAD   MENTAL STATUS/PSYCH:  AAO x3   HEAD:  Atraumatic, Normocephalic  EYES: EOMI, PERRLA, conjunctiva and sclera clear  NECK: Supple, No elevated JVD  CHEST/LUNG: Clear to auscultation bilaterally; No wheeze  HEART: Regular rate and rhythm; No murmurs, rubs, or gallops  ABDOMEN: Soft, TTP b/l LQ Nondistended; Bowel sounds present  EXTREMITIES:  2+ Peripheral Pulses, No clubbing, cyanosis, or edema  NEUROLOGY: CN II-XII grossly intact, moving all extremities  SKIN: No rashes or lesions    LABS:  CAPILLARY BLOOD GLUCOSE                              10.8   6.09  )-----------( 623      ( 11 Oct 2019 09:37 )             33.2     10-11    137  |  107  |  13  ----------------------------<  102<H>  4.1   |  17<L>  |  2.68<H>    Ca    8.4      11 Oct 2019 09:37  Phos  3.5     10-11  Mg     1.6     10-11    TPro  5.9<L>  /  Alb  2.4<L>  /  TBili  0.6  /  DBili  x   /  AST  33<H>  /  ALT  17  /  AlkPhos  162<H>  10-11              RADIOLOGY & ADDITIONAL TESTS:    Imaging Personally Reviewed:    Consultant(s) Notes Reviewed:      Care Discussed with Consultants/Other Providers:

## 2019-10-12 LAB
ALBUMIN SERPL ELPH-MCNC: 1.8 G/DL — LOW (ref 3.3–5)
ALP SERPL-CCNC: 112 U/L — SIGNIFICANT CHANGE UP (ref 40–120)
ALT FLD-CCNC: 15 U/L — SIGNIFICANT CHANGE UP (ref 4–33)
ANION GAP SERPL CALC-SCNC: 13 MMO/L — SIGNIFICANT CHANGE UP (ref 7–14)
AST SERPL-CCNC: 23 U/L — SIGNIFICANT CHANGE UP (ref 4–32)
BILIRUB SERPL-MCNC: 0.4 MG/DL — SIGNIFICANT CHANGE UP (ref 0.2–1.2)
BLD GP AB SCN SERPL QL: NEGATIVE — SIGNIFICANT CHANGE UP
BUN SERPL-MCNC: 12 MG/DL — SIGNIFICANT CHANGE UP (ref 7–23)
CALCIUM SERPL-MCNC: 7.8 MG/DL — LOW (ref 8.4–10.5)
CHLORIDE SERPL-SCNC: 108 MMOL/L — HIGH (ref 98–107)
CO2 SERPL-SCNC: 17 MMOL/L — LOW (ref 22–31)
CREAT ?TM UR-MCNC: 85.8 MG/DL — SIGNIFICANT CHANGE UP
CREAT SERPL-MCNC: 2.27 MG/DL — HIGH (ref 0.5–1.3)
GLUCOSE SERPL-MCNC: 67 MG/DL — LOW (ref 70–99)
HCT VFR BLD CALC: 27 % — LOW (ref 34.5–45)
HCT VFR BLD CALC: 28.8 % — LOW (ref 34.5–45)
HGB BLD-MCNC: 8.3 G/DL — LOW (ref 11.5–15.5)
HGB BLD-MCNC: 9.2 G/DL — LOW (ref 11.5–15.5)
MAGNESIUM SERPL-MCNC: 1.3 MG/DL — LOW (ref 1.6–2.6)
MCHC RBC-ENTMCNC: 26.2 PG — LOW (ref 27–34)
MCHC RBC-ENTMCNC: 26.7 PG — LOW (ref 27–34)
MCHC RBC-ENTMCNC: 30.7 % — LOW (ref 32–36)
MCHC RBC-ENTMCNC: 31.9 % — LOW (ref 32–36)
MCV RBC AUTO: 83.5 FL — SIGNIFICANT CHANGE UP (ref 80–100)
MCV RBC AUTO: 85.2 FL — SIGNIFICANT CHANGE UP (ref 80–100)
NRBC # FLD: 0 K/UL — SIGNIFICANT CHANGE UP (ref 0–0)
NRBC # FLD: 0 K/UL — SIGNIFICANT CHANGE UP (ref 0–0)
PHOSPHATE SERPL-MCNC: 3 MG/DL — SIGNIFICANT CHANGE UP (ref 2.5–4.5)
PLATELET # BLD AUTO: 595 K/UL — HIGH (ref 150–400)
PLATELET # BLD AUTO: 638 K/UL — HIGH (ref 150–400)
PMV BLD: 10 FL — SIGNIFICANT CHANGE UP (ref 7–13)
PMV BLD: 9.6 FL — SIGNIFICANT CHANGE UP (ref 7–13)
POTASSIUM SERPL-MCNC: 3.9 MMOL/L — SIGNIFICANT CHANGE UP (ref 3.5–5.3)
POTASSIUM SERPL-SCNC: 3.9 MMOL/L — SIGNIFICANT CHANGE UP (ref 3.5–5.3)
PROT SERPL-MCNC: 4.4 G/DL — LOW (ref 6–8.3)
PROT UR-MCNC: 12.8 MG/DL — SIGNIFICANT CHANGE UP
RBC # BLD: 3.17 M/UL — LOW (ref 3.8–5.2)
RBC # BLD: 3.45 M/UL — LOW (ref 3.8–5.2)
RBC # FLD: 16.4 % — HIGH (ref 10.3–14.5)
RBC # FLD: 16.7 % — HIGH (ref 10.3–14.5)
RH IG SCN BLD-IMP: POSITIVE — SIGNIFICANT CHANGE UP
SODIUM SERPL-SCNC: 138 MMOL/L — SIGNIFICANT CHANGE UP (ref 135–145)
WBC # BLD: 5.32 K/UL — SIGNIFICANT CHANGE UP (ref 3.8–10.5)
WBC # BLD: 5.79 K/UL — SIGNIFICANT CHANGE UP (ref 3.8–10.5)
WBC # FLD AUTO: 5.32 K/UL — SIGNIFICANT CHANGE UP (ref 3.8–10.5)
WBC # FLD AUTO: 5.79 K/UL — SIGNIFICANT CHANGE UP (ref 3.8–10.5)

## 2019-10-12 PROCEDURE — 99233 SBSQ HOSP IP/OBS HIGH 50: CPT

## 2019-10-12 RX ORDER — MAGNESIUM SULFATE 500 MG/ML
2 VIAL (ML) INJECTION EVERY 4 HOURS
Refills: 0 | Status: COMPLETED | OUTPATIENT
Start: 2019-10-12 | End: 2019-10-12

## 2019-10-12 RX ADMIN — HYDROMORPHONE HYDROCHLORIDE 2 MILLIGRAM(S): 2 INJECTION INTRAMUSCULAR; INTRAVENOUS; SUBCUTANEOUS at 18:29

## 2019-10-12 RX ADMIN — HYDROMORPHONE HYDROCHLORIDE 2 MILLIGRAM(S): 2 INJECTION INTRAMUSCULAR; INTRAVENOUS; SUBCUTANEOUS at 05:54

## 2019-10-12 RX ADMIN — HYDROMORPHONE HYDROCHLORIDE 2 MILLIGRAM(S): 2 INJECTION INTRAMUSCULAR; INTRAVENOUS; SUBCUTANEOUS at 01:23

## 2019-10-12 RX ADMIN — Medication 50 GRAM(S): at 12:15

## 2019-10-12 RX ADMIN — HYDROMORPHONE HYDROCHLORIDE 2 MILLIGRAM(S): 2 INJECTION INTRAMUSCULAR; INTRAVENOUS; SUBCUTANEOUS at 02:00

## 2019-10-12 RX ADMIN — HYDROMORPHONE HYDROCHLORIDE 2 MILLIGRAM(S): 2 INJECTION INTRAMUSCULAR; INTRAVENOUS; SUBCUTANEOUS at 12:14

## 2019-10-12 RX ADMIN — HYDROMORPHONE HYDROCHLORIDE 2 MILLIGRAM(S): 2 INJECTION INTRAMUSCULAR; INTRAVENOUS; SUBCUTANEOUS at 12:44

## 2019-10-12 RX ADMIN — HYDROMORPHONE HYDROCHLORIDE 2 MILLIGRAM(S): 2 INJECTION INTRAMUSCULAR; INTRAVENOUS; SUBCUTANEOUS at 17:59

## 2019-10-12 RX ADMIN — HYDROMORPHONE HYDROCHLORIDE 2 MILLIGRAM(S): 2 INJECTION INTRAMUSCULAR; INTRAVENOUS; SUBCUTANEOUS at 06:27

## 2019-10-12 RX ADMIN — Medication 50 GRAM(S): at 15:18

## 2019-10-12 RX ADMIN — HEPARIN SODIUM 5000 UNIT(S): 5000 INJECTION INTRAVENOUS; SUBCUTANEOUS at 05:54

## 2019-10-12 RX ADMIN — SODIUM CHLORIDE 75 MILLILITER(S): 9 INJECTION, SOLUTION INTRAVENOUS at 05:55

## 2019-10-12 RX ADMIN — HEPARIN SODIUM 5000 UNIT(S): 5000 INJECTION INTRAVENOUS; SUBCUTANEOUS at 17:29

## 2019-10-12 NOTE — PROGRESS NOTE ADULT - PROBLEM SELECTOR PLAN 4
Gemcitabine/Abraxane, started Sept 2019, last dose on 9/25  -Outpt followup with Dr. Tong Epps at [facility]/onc recs noted

## 2019-10-12 NOTE — PROGRESS NOTE ADULT - PROBLEM SELECTOR PLAN 1
suspect ATN in the setting of sepsis, abx and contrast use. Cr improving  -kidney US no hydro  -c/w IVF and trend Cr  - Cr has been improving

## 2019-10-12 NOTE — PROGRESS NOTE ADULT - SUBJECTIVE AND OBJECTIVE BOX
Hospitalist: Mile Wilkerson DO    CHIEF COMPLAINT: Patient is a 68y old  female who presents with a chief complaint of Sepsis (11 Oct 2019 14:47)      SUBJECTIVE / OVERNIGHT EVENTS: Patient seen and examined. No acute events overnight. Pain well controlled and patient without any complaints.    MEDICATIONS  (STANDING):  heparin  Injectable 5000 Unit(s) SubCutaneous every 12 hours    MEDICATIONS  (PRN):  HYDROmorphone   Tablet 2 milliGRAM(s) Oral every 4 hours PRN Severe Pain (7 - 10)  metoclopramide Injectable 5 milliGRAM(s) IV Push every 6 hours PRN N/V      VITALS:  T(F): 98.3 (10-12-19 @ 14:00), Max: 98.3 (10-12-19 @ 14:00)  HR: 69 (10-12-19 @ 14:00) (69 - 87)  BP: 149/86 (10-12-19 @ 14:00) (135/80 - 149/86)  RR: 19 (10-12-19 @ 14:00) (19 - 20)  SpO2: 100% (10-12-19 @ 14:00)    Weight (kg): 74.4 (09:43)    PHYSICAL EXAM:  GENERAL: chronically ill female lying in bed in NAD   MENTAL STATUS/PSYCH:  AAO x3   HEAD:  Atraumatic, Normocephalic  EYES: EOMI, PERRLA, conjunctiva and sclera clear  NECK: Supple, No elevated JVD  CHEST/LUNG: Clear to auscultation bilaterally; No wheeze  HEART: Regular rate and rhythm; No murmurs, rubs, or gallops  ABDOMEN: Soft, TTP b/l LQ Nondistended; Bowel sounds present  EXTREMITIES:  2+ Peripheral Pulses, No clubbing, cyanosis, or edema  NEUROLOGY: CN II-XII grossly intact, moving all extremities  SKIN: No rashes or lesions  SKIN: No rashes or lesions    LABS:              9.2                  x    | x    | x            5.79  >-----------< 638     ------------------------< x                     28.8                 x    | x    | x                                            Ca x     Mg x     Ph x           TPro  4.4  /  Alb  1.8      TBili  0.4  /  DBili  x         AST  23  /  ALT  15            AlkPhos  112                  CAPILLARY BLOOD GLUCOSE    POCT Blood Glucose.: 108 mg/dL (12 Oct 2019 10:10)      [ ] Care Discussed with Consultants/Other Providers: with covering PA

## 2019-10-12 NOTE — PROGRESS NOTE ADULT - ASSESSMENT
68F w/PMH of Stage IV pancreatic adenocarcinoma c/b prior biliary obstruction undergoing active chemo (last treatment 9/25- [facility]), COPD, chronic anemia, HTN, PVD s/p RLE stenting and bypass presenting from home with complaints of weakness, abdominal pain, n/v, a/f sepsis 2/2 cecal colitis vs typhlitis, small bowel ileus w/ probable enteritis on CT A/P, possibly chemo induced with pancytopenia (mild neutropenia), and severe protein calorie malnutrition. Neutropenia resolved and now with normal wbc and plts. Cx NGTD. Course c/b SAMREEN

## 2019-10-13 DIAGNOSIS — R10.84 GENERALIZED ABDOMINAL PAIN: ICD-10-CM

## 2019-10-13 LAB
ALBUMIN SERPL ELPH-MCNC: 2.2 G/DL — LOW (ref 3.3–5)
ALP SERPL-CCNC: 138 U/L — HIGH (ref 40–120)
ALT FLD-CCNC: 15 U/L — SIGNIFICANT CHANGE UP (ref 4–33)
ANION GAP SERPL CALC-SCNC: 11 MMO/L — SIGNIFICANT CHANGE UP (ref 7–14)
AST SERPL-CCNC: 26 U/L — SIGNIFICANT CHANGE UP (ref 4–32)
BILIRUB SERPL-MCNC: 0.5 MG/DL — SIGNIFICANT CHANGE UP (ref 0.2–1.2)
BUN SERPL-MCNC: 17 MG/DL — SIGNIFICANT CHANGE UP (ref 7–23)
CALCIUM SERPL-MCNC: 8.4 MG/DL — SIGNIFICANT CHANGE UP (ref 8.4–10.5)
CHLORIDE SERPL-SCNC: 106 MMOL/L — SIGNIFICANT CHANGE UP (ref 98–107)
CO2 SERPL-SCNC: 22 MMOL/L — SIGNIFICANT CHANGE UP (ref 22–31)
CREAT SERPL-MCNC: 2.81 MG/DL — HIGH (ref 0.5–1.3)
GLUCOSE SERPL-MCNC: 89 MG/DL — SIGNIFICANT CHANGE UP (ref 70–99)
HCT VFR BLD CALC: 28.7 % — LOW (ref 34.5–45)
HGB BLD-MCNC: 9.2 G/DL — LOW (ref 11.5–15.5)
MAGNESIUM SERPL-MCNC: 2.1 MG/DL — SIGNIFICANT CHANGE UP (ref 1.6–2.6)
MCHC RBC-ENTMCNC: 27.2 PG — SIGNIFICANT CHANGE UP (ref 27–34)
MCHC RBC-ENTMCNC: 32.1 % — SIGNIFICANT CHANGE UP (ref 32–36)
MCV RBC AUTO: 84.9 FL — SIGNIFICANT CHANGE UP (ref 80–100)
NRBC # FLD: 0 K/UL — SIGNIFICANT CHANGE UP (ref 0–0)
PHOSPHATE SERPL-MCNC: 3.8 MG/DL — SIGNIFICANT CHANGE UP (ref 2.5–4.5)
PLATELET # BLD AUTO: 639 K/UL — HIGH (ref 150–400)
PMV BLD: 10 FL — SIGNIFICANT CHANGE UP (ref 7–13)
POTASSIUM SERPL-MCNC: 4.2 MMOL/L — SIGNIFICANT CHANGE UP (ref 3.5–5.3)
POTASSIUM SERPL-SCNC: 4.2 MMOL/L — SIGNIFICANT CHANGE UP (ref 3.5–5.3)
PROT SERPL-MCNC: 5.3 G/DL — LOW (ref 6–8.3)
RBC # BLD: 3.38 M/UL — LOW (ref 3.8–5.2)
RBC # FLD: 16.7 % — HIGH (ref 10.3–14.5)
SODIUM SERPL-SCNC: 139 MMOL/L — SIGNIFICANT CHANGE UP (ref 135–145)
WBC # BLD: 5.8 K/UL — SIGNIFICANT CHANGE UP (ref 3.8–10.5)
WBC # FLD AUTO: 5.8 K/UL — SIGNIFICANT CHANGE UP (ref 3.8–10.5)

## 2019-10-13 PROCEDURE — 99233 SBSQ HOSP IP/OBS HIGH 50: CPT

## 2019-10-13 RX ORDER — OXYCODONE HYDROCHLORIDE 5 MG/1
5 TABLET ORAL
Refills: 0 | Status: DISCONTINUED | OUTPATIENT
Start: 2019-10-13 | End: 2019-10-14

## 2019-10-13 RX ORDER — SODIUM CHLORIDE 9 MG/ML
1000 INJECTION INTRAMUSCULAR; INTRAVENOUS; SUBCUTANEOUS
Refills: 0 | Status: DISCONTINUED | OUTPATIENT
Start: 2019-10-13 | End: 2019-10-14

## 2019-10-13 RX ORDER — SENNA PLUS 8.6 MG/1
2 TABLET ORAL AT BEDTIME
Refills: 0 | Status: DISCONTINUED | OUTPATIENT
Start: 2019-10-13 | End: 2019-10-14

## 2019-10-13 RX ADMIN — HYDROMORPHONE HYDROCHLORIDE 2 MILLIGRAM(S): 2 INJECTION INTRAMUSCULAR; INTRAVENOUS; SUBCUTANEOUS at 02:00

## 2019-10-13 RX ADMIN — HYDROMORPHONE HYDROCHLORIDE 2 MILLIGRAM(S): 2 INJECTION INTRAMUSCULAR; INTRAVENOUS; SUBCUTANEOUS at 09:48

## 2019-10-13 RX ADMIN — OXYCODONE HYDROCHLORIDE 5 MILLIGRAM(S): 5 TABLET ORAL at 18:25

## 2019-10-13 RX ADMIN — Medication 1 TABLET(S): at 17:55

## 2019-10-13 RX ADMIN — HYDROMORPHONE HYDROCHLORIDE 2 MILLIGRAM(S): 2 INJECTION INTRAMUSCULAR; INTRAVENOUS; SUBCUTANEOUS at 17:12

## 2019-10-13 RX ADMIN — SENNA PLUS 2 TABLET(S): 8.6 TABLET ORAL at 22:04

## 2019-10-13 RX ADMIN — HEPARIN SODIUM 5000 UNIT(S): 5000 INJECTION INTRAVENOUS; SUBCUTANEOUS at 05:37

## 2019-10-13 RX ADMIN — SODIUM CHLORIDE 50 MILLILITER(S): 9 INJECTION INTRAMUSCULAR; INTRAVENOUS; SUBCUTANEOUS at 11:41

## 2019-10-13 RX ADMIN — HYDROMORPHONE HYDROCHLORIDE 2 MILLIGRAM(S): 2 INJECTION INTRAMUSCULAR; INTRAVENOUS; SUBCUTANEOUS at 16:42

## 2019-10-13 RX ADMIN — HEPARIN SODIUM 5000 UNIT(S): 5000 INJECTION INTRAVENOUS; SUBCUTANEOUS at 17:55

## 2019-10-13 RX ADMIN — OXYCODONE HYDROCHLORIDE 5 MILLIGRAM(S): 5 TABLET ORAL at 11:41

## 2019-10-13 RX ADMIN — HYDROMORPHONE HYDROCHLORIDE 2 MILLIGRAM(S): 2 INJECTION INTRAMUSCULAR; INTRAVENOUS; SUBCUTANEOUS at 09:18

## 2019-10-13 RX ADMIN — OXYCODONE HYDROCHLORIDE 5 MILLIGRAM(S): 5 TABLET ORAL at 17:55

## 2019-10-13 RX ADMIN — HYDROMORPHONE HYDROCHLORIDE 2 MILLIGRAM(S): 2 INJECTION INTRAMUSCULAR; INTRAVENOUS; SUBCUTANEOUS at 01:06

## 2019-10-13 RX ADMIN — OXYCODONE HYDROCHLORIDE 5 MILLIGRAM(S): 5 TABLET ORAL at 12:11

## 2019-10-13 NOTE — PROGRESS NOTE ADULT - PROBLEM SELECTOR PLAN 2
acute knee pain without signs of inflammation. suspect OA. now resolved  - R knee XR no fx   -pain control - ON Dilaudid PRN but not controlled properly.  - will start OXy 5mg PO BID for now ATC for better pain control

## 2019-10-13 NOTE — PROGRESS NOTE ADULT - PROBLEM SELECTOR PLAN 3
sepsis d/t colitis, now resolved   -s/p Zosyn x 5 days.   -advance diet as tolerated acute knee pain without signs of inflammation. suspect OA. now resolved  - R knee XR no fx   -pain control

## 2019-10-13 NOTE — PROGRESS NOTE ADULT - SUBJECTIVE AND OBJECTIVE BOX
Hospitalist: Mile Wilkerson DO    CHIEF COMPLAINT: Patient is a 68y old  female who presents with a chief complaint of Sepsis (12 Oct 2019 20:40)      SUBJECTIVE / OVERNIGHT EVENTS: Patient seen and examined. No acute events overnight. Pain well controlled and patient without any complaints.    MEDICATIONS  (STANDING):  heparin  Injectable 5000 Unit(s) SubCutaneous every 12 hours    MEDICATIONS  (PRN):  HYDROmorphone   Tablet 2 milliGRAM(s) Oral every 4 hours PRN Severe Pain (7 - 10)  metoclopramide Injectable 5 milliGRAM(s) IV Push every 6 hours PRN N/V      VITALS:  T(F): 98.2 (10-13-19 @ 05:43), Max: 98.3 (10-12-19 @ 14:00)  HR: 83 (10-13-19 @ 05:43) (69 - 83)  BP: 147/73 (10-13-19 @ 05:43) (139/78 - 149/86)  RR: 18 (10-13-19 @ 05:43) (17 - 19)  SpO2: 98% (10-13-19 @ 05:43)    Weight (kg): 74.4 (09:43)    PHYSICAL EXAM:    GENERAL: chronically ill female lying in bed in NAD   MENTAL STATUS/PSYCH:  AAO x3   HEAD:  Atraumatic, Normocephalic  EYES: EOMI, PERRLA, conjunctiva and sclera clear  NECK: Supple, No elevated JVD  CHEST/LUNG: Clear to auscultation bilaterally; No wheeze  HEART: Regular rate and rhythm; No murmurs, rubs, or gallops  ABDOMEN: Soft, TTP b/l LQ Nondistended; Bowel sounds present  EXTREMITIES:  2+ Peripheral Pulses, No clubbing, cyanosis, or edema  NEUROLOGY: CN II-XII grossly intact, moving all extremities  SKIN: No rashes or lesions    LABS:              9.2                  139  | 22   | 17           5.80  >-----------< 639     ------------------------< 89                    28.7                 4.2  | 106  | 2.81                                         Ca 8.4   Mg 2.1   Ph 3.8         TPro  5.3  /  Alb  2.2      TBili  0.5  /  DBili  x         AST  26  /  ALT  15            AlkPhos  138                  CAPILLARY BLOOD GLUCOSE      MICROBIOLOGY:    Culture - Urine (10.02.19 @ 04:18)    Culture - Urine:   GNR^Gram Neg Rods  COLONY COUNT: LESS THAN 10,000 CFU/ML    Specimen Source: URINE MIDSTREAM          [ ] Care Discussed with Consultants/Other Providers: with covering PA

## 2019-10-13 NOTE — PROGRESS NOTE ADULT - PROBLEM SELECTOR PLAN 1
suspect ATN in the setting of sepsis, abx and contrast use.   - Cr this morning 2.8 (from 2.2) | will ask nephrology team to help us with their recommendation  - Start IVF @ 50 CC/hr   -kidney US no hydro  -c/w IVF and trend Cr  - Cr has been improving suspect ATN in the setting of sepsis, abx and contrast use.   - Cr this morning 2.8 (from 2.2) | will ask nephrology team to help us with their recommendation  - Start IVF @ 50 CC/hr   -kidney US no hydro  -c/w IVF and trend Cr

## 2019-10-13 NOTE — PROGRESS NOTE ADULT - PROBLEM SELECTOR PLAN 4
Gemcitabine/Abraxane, started Sept 2019, last dose on 9/25  -Outpt followup with Dr. Tong Epps at [facility]/onc recs noted sepsis d/t colitis, now resolved   -s/p Zosyn x 5 days.   -advance diet as tolerated

## 2019-10-13 NOTE — PROGRESS NOTE ADULT - ASSESSMENT
68F w/PMH of Stage IV pancreatic adenocarcinoma c/b prior biliary obstruction undergoing active chemo (last treatment 9/25- [facility]), COPD, chronic anemia, HTN, PVD s/p RLE stenting and bypass presenting from home with complaints of weakness, abdominal pain, n/v, a/f sepsis 2/2 cecal colitis vs typhlitis, small bowel ileus w/ probable enteritis on CT A/P, possibly chemo induced with pancytopenia (mild neutropenia), and severe protein calorie malnutrition. Neutropenia resolved and now with normal wbc and plts. Cx NGTD. Course c/b SAMREEN lind due to ATN.

## 2019-10-13 NOTE — PROGRESS NOTE ADULT - PROBLEM SELECTOR PLAN 5
likely d/t recent chemo, now with normal wbc and plt. continues to have anemia and will trend h/h Gemcitabine/Abraxane, started Sept 2019, last dose on 9/25  -Outpt followup with Dr. Tong Epps at [facility]/onc recs noted

## 2019-10-14 ENCOUNTER — APPOINTMENT (OUTPATIENT)
Dept: PULMONOLOGY | Facility: CLINIC | Age: 69
End: 2019-10-14

## 2019-10-14 ENCOUNTER — TRANSCRIPTION ENCOUNTER (OUTPATIENT)
Age: 69
End: 2019-10-14

## 2019-10-14 VITALS
RESPIRATION RATE: 16 BRPM | SYSTOLIC BLOOD PRESSURE: 154 MMHG | DIASTOLIC BLOOD PRESSURE: 81 MMHG | OXYGEN SATURATION: 98 % | HEART RATE: 67 BPM | TEMPERATURE: 98 F

## 2019-10-14 LAB
ALBUMIN SERPL ELPH-MCNC: 2.4 G/DL — LOW (ref 3.3–5)
ALP SERPL-CCNC: 143 U/L — HIGH (ref 40–120)
ALT FLD-CCNC: 18 U/L — SIGNIFICANT CHANGE UP (ref 4–33)
ANION GAP SERPL CALC-SCNC: 12 MMO/L — SIGNIFICANT CHANGE UP (ref 7–14)
AST SERPL-CCNC: 29 U/L — SIGNIFICANT CHANGE UP (ref 4–32)
BILIRUB SERPL-MCNC: 0.6 MG/DL — SIGNIFICANT CHANGE UP (ref 0.2–1.2)
BUN SERPL-MCNC: 18 MG/DL — SIGNIFICANT CHANGE UP (ref 7–23)
CALCIUM SERPL-MCNC: 8.4 MG/DL — SIGNIFICANT CHANGE UP (ref 8.4–10.5)
CHLORIDE SERPL-SCNC: 108 MMOL/L — HIGH (ref 98–107)
CO2 SERPL-SCNC: 20 MMOL/L — LOW (ref 22–31)
CREAT SERPL-MCNC: 2.67 MG/DL — HIGH (ref 0.5–1.3)
GLUCOSE SERPL-MCNC: 89 MG/DL — SIGNIFICANT CHANGE UP (ref 70–99)
HCT VFR BLD CALC: 28.5 % — LOW (ref 34.5–45)
HGB BLD-MCNC: 9.2 G/DL — LOW (ref 11.5–15.5)
MAGNESIUM SERPL-MCNC: 2 MG/DL — SIGNIFICANT CHANGE UP (ref 1.6–2.6)
MCHC RBC-ENTMCNC: 26.7 PG — LOW (ref 27–34)
MCHC RBC-ENTMCNC: 32.3 % — SIGNIFICANT CHANGE UP (ref 32–36)
MCV RBC AUTO: 82.6 FL — SIGNIFICANT CHANGE UP (ref 80–100)
NRBC # FLD: 0 K/UL — SIGNIFICANT CHANGE UP (ref 0–0)
PHOSPHATE SERPL-MCNC: 3.7 MG/DL — SIGNIFICANT CHANGE UP (ref 2.5–4.5)
PLATELET # BLD AUTO: 673 K/UL — HIGH (ref 150–400)
PMV BLD: 9.8 FL — SIGNIFICANT CHANGE UP (ref 7–13)
POTASSIUM SERPL-MCNC: 3.8 MMOL/L — SIGNIFICANT CHANGE UP (ref 3.5–5.3)
POTASSIUM SERPL-SCNC: 3.8 MMOL/L — SIGNIFICANT CHANGE UP (ref 3.5–5.3)
PROT SERPL-MCNC: 5.6 G/DL — LOW (ref 6–8.3)
RBC # BLD: 3.45 M/UL — LOW (ref 3.8–5.2)
RBC # FLD: 16.8 % — HIGH (ref 10.3–14.5)
SODIUM SERPL-SCNC: 140 MMOL/L — SIGNIFICANT CHANGE UP (ref 135–145)
WBC # BLD: 5.76 K/UL — SIGNIFICANT CHANGE UP (ref 3.8–10.5)
WBC # FLD AUTO: 5.76 K/UL — SIGNIFICANT CHANGE UP (ref 3.8–10.5)

## 2019-10-14 PROCEDURE — 99239 HOSP IP/OBS DSCHRG MGMT >30: CPT

## 2019-10-14 PROCEDURE — 99232 SBSQ HOSP IP/OBS MODERATE 35: CPT | Mod: GC

## 2019-10-14 RX ORDER — SENNA PLUS 8.6 MG/1
2 TABLET ORAL
Qty: 0 | Refills: 0 | DISCHARGE
Start: 2019-10-14

## 2019-10-14 RX ORDER — OXYCODONE HYDROCHLORIDE 5 MG/1
1 TABLET ORAL
Qty: 14 | Refills: 0
Start: 2019-10-14 | End: 2019-10-20

## 2019-10-14 RX ADMIN — Medication 1 TABLET(S): at 12:57

## 2019-10-14 RX ADMIN — HYDROMORPHONE HYDROCHLORIDE 2 MILLIGRAM(S): 2 INJECTION INTRAMUSCULAR; INTRAVENOUS; SUBCUTANEOUS at 13:23

## 2019-10-14 RX ADMIN — HYDROMORPHONE HYDROCHLORIDE 2 MILLIGRAM(S): 2 INJECTION INTRAMUSCULAR; INTRAVENOUS; SUBCUTANEOUS at 12:53

## 2019-10-14 RX ADMIN — OXYCODONE HYDROCHLORIDE 5 MILLIGRAM(S): 5 TABLET ORAL at 06:00

## 2019-10-14 RX ADMIN — OXYCODONE HYDROCHLORIDE 5 MILLIGRAM(S): 5 TABLET ORAL at 07:14

## 2019-10-14 RX ADMIN — HEPARIN SODIUM 5000 UNIT(S): 5000 INJECTION INTRAVENOUS; SUBCUTANEOUS at 06:00

## 2019-10-14 NOTE — PROGRESS NOTE ADULT - ASSESSMENT
68F w/PMH of Stage IV pancreatic adenocarcinoma c/b prior biliary obstruction undergoing active chemo (last treatment 9/25- [facility]), COPD, chronic anemia, HTN, PVD s/p RLE stenting and bypass presenting from home with complaints of weakness, abdominal pain, n/v, a/f sepsis 2/2 cecal colitis vs typhlitis, small bowel ileus w/ probable enteritis on CT A/P, possibly chemo induced with pancytopenia (mild neutropenia), and severe protein calorie malnutrition. Neutropenia resolved and now with normal wbc and plts. Cx NGTD. Course c/b SAMREEN

## 2019-10-14 NOTE — PROGRESS NOTE ADULT - REASON FOR ADMISSION
Sepsis

## 2019-10-14 NOTE — PROGRESS NOTE ADULT - ATTENDING COMMENTS
Patient examined and ROS reviewed. A caseo of SAMREEN in the setting of poor intake, use of contrast and Vancomycin. Serum creatinine rising. Serum electrolytes are within acceptable range. Advised fluid balance.
stable for discharge home today  total time spent on discharge 41min

## 2019-10-14 NOTE — PROGRESS NOTE ADULT - PROBLEM SELECTOR PROBLEM 1
Acute kidney injury
Sepsis
Acute kidney injury

## 2019-10-14 NOTE — PROGRESS NOTE ADULT - PROVIDER SPECIALTY LIST ADULT
Heme/Onc
Hospitalist
Internal Medicine
Internal Medicine
Nephrology
Nephrology
Heme/Onc
Hospitalist
Hospitalist

## 2019-10-14 NOTE — PROGRESS NOTE ADULT - PROBLEM SELECTOR PLAN 4
Gemcitabine/Abraxane, started Sept 2019, last dose on 9/25  -Outpt followup with Dr. Tong Epps at [facility]/onc recs noted

## 2019-10-14 NOTE — DISCHARGE NOTE NURSING/CASE MANAGEMENT/SOCIAL WORK - NSDPDISTO_GEN_ALL_CORE
[Spouse] : spouse [Family Member] : family member [FreeTextEntry1] : F/U  on  Elevated B/P [de-identified] : Patient is here in followup for multiple significant medical problems include including cerebral edema for which she is on Decadron and Avastin. She also has poorly controlled diabetes and glucose reviewed with the family and a 3-400 range. In addition since she's been on Avastin her blood pressure has increased significantly. She is slightly improved but is here because of these multiple ongoing significant medical problem Home

## 2019-10-14 NOTE — DISCHARGE NOTE NURSING/CASE MANAGEMENT/SOCIAL WORK - PATIENT PORTAL LINK FT
You can access the FollowMyHealth Patient Portal offered by [facility] by registering at the following website: [email or web address]. By joining [facility]’s FollowMyHealth portal, you will also be able to view your health information using other applications (apps) compatible with our system.

## 2019-10-14 NOTE — PROGRESS NOTE ADULT - PROBLEM SELECTOR PLAN 1
suspect ATN in the setting of sepsis, abx and contrast use. Cr improved but remains elevated, likely new baseline   -kidney US no hydro  -cont to trend Cr

## 2019-10-14 NOTE — PROGRESS NOTE ADULT - ASSESSMENT
68F w/ Stage IV pancreatic adenocarcinoma (on [place]/Abraxane), presenting from home with abdominal pain, found to have colitis s/p abx treatment.     # Colitis: resolved   - CT A/P done - showing colitis, no free air   - s/p cipro/flagyl     # SAMREEN:   - SCr stable around 2.5  - Nephrology following, thought to be 2/2 poor oral intake, IV contrast use, vancomycin/zosyn combination    # Pancreatic Adenocarcinoma:   - on Gemcitabine/Abraxane, started Sept 2019  - last dose on 9/26  - will follow-up with Dr. Tong Epps as an outpatient at [facility]      Mariposa Mendoza MD  Hematology/Oncology Fellow, PGY-5  pager: [phone]  After 5pm or on weekends, please page the on-call fellow.

## 2019-10-14 NOTE — PROGRESS NOTE ADULT - PROBLEM SELECTOR PROBLEM 8
Please fax copy of pre-op to facility where procedure will be done. Severe protein-calorie malnutrition

## 2019-10-14 NOTE — PROGRESS NOTE ADULT - SUBJECTIVE AND OBJECTIVE BOX
Patient is a 68y old  Female who presents with a chief complaint of Sepsis (13 Oct 2019 09:28)      SUBJECTIVE / OVERNIGHT EVENTS:    No acute event o/n. Pt reports her abd pain is controlled. Eating well.     Review of Systems:    RESPIRATORY: No cough, wheezing, chills or hemoptysis; No shortness of breath  CARDIOVASCULAR: No chest pain, palpitations, dizziness, or leg swelling  GASTROINTESTINAL: + abdominal  pain. No nausea, vomiting, or hematemesis; No diarrhea or constipation. No melena or hematochezia.      MEDICATIONS  (STANDING):  heparin  Injectable 5000 Unit(s) SubCutaneous every 12 hours  multivitamin 1 Tablet(s) Oral daily  oxyCODONE    IR 5 milliGRAM(s) Oral two times a day  senna 2 Tablet(s) Oral at bedtime  sodium chloride 0.9%. 1000 milliLiter(s) (50 mL/Hr) IV Continuous <Continuous>    MEDICATIONS  (PRN):  HYDROmorphone   Tablet 2 milliGRAM(s) Oral every 4 hours PRN Severe Pain (7 - 10)  metoclopramide Injectable 5 milliGRAM(s) IV Push every 6 hours PRN N/V      PHYSICAL EXAM:  T(C): 36.6 (10-14-19 @ 05:56), Max: 36.8 (10-13-19 @ 14:33)  HR: 71 (10-14-19 @ 05:56) (63 - 86)  BP: 143/70 (10-14-19 @ 05:56) (135/93 - 146/67)  RR: 16 (10-14-19 @ 05:56) (16 - 18)  SpO2: 99% (10-14-19 @ 05:56) (97% - 99%)  I&O's Summary    13 Oct 2019 07:01  -  14 Oct 2019 07:00  --------------------------------------------------------  IN: 600 mL / OUT: 1300 mL / NET: -700 mL    14 Oct 2019 07:01  -  14 Oct 2019 11:15  --------------------------------------------------------  IN: 0 mL / OUT: 300 mL / NET: -300 mL      GENERAL: chronically ill female lying in bed in NAD   MENTAL STATUS/PSYCH:  AAO x3   HEAD:  Atraumatic, Normocephalic  EYES: EOMI, PERRLA, conjunctiva and sclera clear  NECK: Supple, No elevated JVD  CHEST/LUNG: Clear to auscultation bilaterally; No wheeze  HEART: Regular rate and rhythm; No murmurs, rubs, or gallops  ABDOMEN: Soft, TTP b/l LQ Nondistended; Bowel sounds present  EXTREMITIES:  2+ Peripheral Pulses, No clubbing, cyanosis, or edema  NEUROLOGY: CN II-XII grossly intact, moving all extremities  SKIN: No rashes or lesions    LABS:  CAPILLARY BLOOD GLUCOSE                              9.2    5.76  )-----------( 673      ( 14 Oct 2019 05:10 )             28.5     10-14    140  |  108<H>  |  18  ----------------------------<  89  3.8   |  20<L>  |  2.67<H>    Ca    8.4      14 Oct 2019 05:10  Phos  3.7     10-14  Mg     2.0     10-14    TPro  5.6<L>  /  Alb  2.4<L>  /  TBili  0.6  /  DBili  x   /  AST  29  /  ALT  18  /  AlkPhos  143<H>  10-14              RADIOLOGY & ADDITIONAL TESTS:    Imaging Personally Reviewed:    Consultant(s) Notes Reviewed:      Care Discussed with Consultants/Other Providers:

## 2019-10-14 NOTE — PROGRESS NOTE ADULT - SUBJECTIVE AND OBJECTIVE BOX
INTERVAL HPI/OVERNIGHT EVENTS:  Patient S&E at bedside.   No acute complaints, no nausea or vomiting.  Wants to go home, awaiting home services.       VITAL SIGNS:  T(F): 98.4 (10-14-19 @ 14:26)  HR: 67 (10-14-19 @ 14:26)  BP: 154/81 (10-14-19 @ 14:26)  RR: 16 (10-14-19 @ 14:26)  SpO2: 98% (10-14-19 @ 14:26)      PHYSICAL EXAM:  Constitutional: NAD  Eyes: EOMI, sclera non-icteric  Neck: supple  Respiratory: CTA b/l, good air entry b/l  Cardiovascular: RRR, no M/R/G  Gastrointestinal: soft, NTND  Extremities: no c/c/e  Neurological: AAOx3      MEDICATIONS  (STANDING):  heparin  Injectable 5000 Unit(s) SubCutaneous every 12 hours  multivitamin 1 Tablet(s) Oral daily  oxyCODONE    IR 5 milliGRAM(s) Oral two times a day  senna 2 Tablet(s) Oral at bedtime  sodium chloride 0.9%. 1000 milliLiter(s) (50 mL/Hr) IV Continuous <Continuous>    MEDICATIONS  (PRN):  HYDROmorphone   Tablet 2 milliGRAM(s) Oral every 4 hours PRN Severe Pain (7 - 10)  metoclopramide Injectable 5 milliGRAM(s) IV Push every 6 hours PRN N/V      Allergies  No Known Allergies        LABS:                        9.2    5.76  )-----------( 673      ( 14 Oct 2019 05:10 )             28.5     10-14    140  |  108<H>  |  18  ----------------------------<  89  3.8   |  20<L>  |  2.67<H>    Ca    8.4      14 Oct 2019 05:10  Phos  3.7     10-14  Mg     2.0     10-14    TPro  5.6<L>  /  Alb  2.4<L>  /  TBili  0.6  /  DBili  x   /  AST  29  /  ALT  18  /  AlkPhos  143<H>  10-14          RADIOLOGY & ADDITIONAL TESTS:  Studies reviewed.

## 2019-10-16 ENCOUNTER — APPOINTMENT (OUTPATIENT)
Dept: INFUSION THERAPY | Facility: HOSPITAL | Age: 69
End: 2019-10-16

## 2019-10-16 ENCOUNTER — INBOUND DOCUMENT (OUTPATIENT)
Age: 69
End: 2019-10-16

## 2019-10-16 PROBLEM — I73.9 PERIPHERAL VASCULAR DISEASE, UNSPECIFIED: Chronic | Status: ACTIVE | Noted: 2019-10-02

## 2019-10-16 PROBLEM — C25.9 MALIGNANT NEOPLASM OF PANCREAS, UNSPECIFIED: Chronic | Status: ACTIVE | Noted: 2019-10-02

## 2019-10-18 ENCOUNTER — APPOINTMENT (OUTPATIENT)
Dept: HEMATOLOGY ONCOLOGY | Facility: CLINIC | Age: 69
End: 2019-10-18
Payer: COMMERCIAL

## 2019-10-18 VITALS
SYSTOLIC BLOOD PRESSURE: 136 MMHG | DIASTOLIC BLOOD PRESSURE: 88 MMHG | OXYGEN SATURATION: 97 % | TEMPERATURE: 97.8 F | HEART RATE: 83 BPM | RESPIRATION RATE: 16 BRPM

## 2019-10-18 DIAGNOSIS — Z51.5 ENCOUNTER FOR PALLIATIVE CARE: ICD-10-CM

## 2019-10-18 DIAGNOSIS — R63.0 ANOREXIA: ICD-10-CM

## 2019-10-18 DIAGNOSIS — T45.1X5A NAUSEA WITH VOMITING, UNSPECIFIED: ICD-10-CM

## 2019-10-18 DIAGNOSIS — R11.2 NAUSEA WITH VOMITING, UNSPECIFIED: ICD-10-CM

## 2019-10-18 PROCEDURE — 99205 OFFICE O/P NEW HI 60 MIN: CPT

## 2019-10-18 RX ORDER — POTASSIUM CHLORIDE 1500 MG/1
20 TABLET, FILM COATED, EXTENDED RELEASE ORAL
Qty: 5 | Refills: 1 | Status: DISCONTINUED | COMMUNITY
Start: 2019-10-01 | End: 2019-10-18

## 2019-10-18 RX ORDER — DEXAMETHASONE 2 MG/1
2 TABLET ORAL
Qty: 21 | Refills: 0 | Status: ACTIVE | COMMUNITY
Start: 2019-10-18 | End: 1900-01-01

## 2019-10-21 ENCOUNTER — OUTPATIENT (OUTPATIENT)
Dept: OUTPATIENT SERVICES | Facility: HOSPITAL | Age: 69
LOS: 1 days | Discharge: HOME | End: 2019-10-21

## 2019-10-21 DIAGNOSIS — Z95.828 PRESENCE OF OTHER VASCULAR IMPLANTS AND GRAFTS: Chronic | ICD-10-CM

## 2019-10-21 DIAGNOSIS — Z90.710 ACQUIRED ABSENCE OF BOTH CERVIX AND UTERUS: Chronic | ICD-10-CM

## 2019-10-21 DIAGNOSIS — Z98.891 HISTORY OF UTERINE SCAR FROM PREVIOUS SURGERY: Chronic | ICD-10-CM

## 2019-10-21 DIAGNOSIS — C25.9 MALIGNANT NEOPLASM OF PANCREAS, UNSPECIFIED: ICD-10-CM

## 2019-10-23 ENCOUNTER — APPOINTMENT (OUTPATIENT)
Dept: INFUSION THERAPY | Facility: HOSPITAL | Age: 69
End: 2019-10-23

## 2019-10-25 ENCOUNTER — RECORD ABSTRACTING (OUTPATIENT)
Age: 69
End: 2019-10-25

## 2019-10-25 ENCOUNTER — APPOINTMENT (OUTPATIENT)
Dept: HEMATOLOGY ONCOLOGY | Facility: CLINIC | Age: 69
End: 2019-10-25

## 2019-10-25 PROBLEM — R63.0 LOSS OF APPETITE: Status: ACTIVE | Noted: 2019-10-25

## 2019-10-25 PROBLEM — Z51.5 ENCOUNTER FOR PALLIATIVE CARE: Status: ACTIVE | Noted: 2019-10-25

## 2019-10-25 NOTE — HISTORY OF PRESENT ILLNESS
[FreeTextEntry1] : 68yoF with metastatic pancreatic adenocarcinoma presents for initial palliative care visit, referred by Dr. Yee.  PMH significant for HTN, Anemia, Smoking (1 pack x 2 weeks; smoked for 35 yrs; quit 2018), R leg PVD, Claudication of R-LE and RLE stent placement (2017; ambulatory with cane since this). \par \par Patient reports pain in the R abdomen that radiates around to the back, worse after eating. The pain is described as sharp, goes as high as 10/10 at the worst. The pain is always present to some degree.  Uses Oxycodone 5mg approximately twice daily, helps lower the pain but it is still present. \par \par ROS\par +nausea and vomiting- using compazine which has not been helping. Had been on zofran, reglan previously without much effect. \par +loss of appetite - grazes throughout the day, small portions of food. Supplements with ~4 Ensure bottles daily\par +>40lb weight loss over last 6 months\par +weakness, unable to walk on her own.  \par +mood is low\par Denies anxiety, constipation\par \par Patient no longer alone at home ever, her son and daughter ensure someone is always with her. \par Patient is , has five children, two of whom are local. She lives with her son. She is [place] which provides some solace.  Patient has considered d/c'ing DMT but acknowledges she is continuing for her children's sake.\par \par PMD - Dr. Zackery Robles\par \par I-Stop Ref#: [id number]

## 2019-10-25 NOTE — PHYSICAL EXAM
[General Appearance - Alert] : alert [Sclera] : the sclera and conjunctiva were normal [Neck Appearance] : the appearance of the neck was normal [Auscultation Breath Sounds / Voice Sounds] : lungs were clear to auscultation bilaterally [] : no respiratory distress [Heart Rate And Rhythm] : heart rate was normal and rhythm regular [FreeTextEntry1] : 2+ pitting b/l LE edema [Heart Sounds] : normal S1 and S2 [Abdomen Soft] : soft [Bowel Sounds] : normal bowel sounds [Skin Color & Pigmentation] : normal skin color and pigmentation [No Focal Deficits] : no focal deficits [Oriented To Time, Place, And Person] : oriented to person, place, and time

## 2019-10-25 NOTE — DATA REVIEWED
[FreeTextEntry1] : CT A/P (9/2019) - LOWER CHEST: Partially imaged right middle lobe nodule. Additional unchanged \par pulmonary nodule within the left lower lobe. Left lower lobe pulmonary cyst. \par \par LIVER: Scattered subcentimeter hypodensities are too small to characterize. \par BILE DUCTS: Pneumobilia secondary to CBD stent. \par GALLBLADDER: Cholelithiasis. Gallbladder wall thickening. \par \par SPLEEN: Within normal limits. \par PANCREAS: Pancreatic head mass with patient's known pancreatic cancer. \par Abrupt cut off and dilation of the pancreatic duct. Peripancreatic fat \par stranding and fat infiltration. Increased number of subcentimeter short axis \par and mildly enlarged peripancreatic and retroperitoneal nodes. \par ADRENALS: Within normal limits. \par \par KIDNEYS/URETERS: Within normal limits. \par BLADDER: Within normal limits. \par REPRODUCTIVE ORGANS: Hysterectomy. \par \par BOWEL: Thickening of the cecum consistent with colitis. Fluid-filled loops of small bowel due to ileus. No bowel obstruction. Normal appendix. \par PERITONEUM: No free air. \par VESSELS: Atherosclerotic changes. Extensive atherosclerotic disease with chronic occlusion of the right common, external and internal iliac arteries. The femorofemoral bypass is not opacified. \par RETROPERITONEUM/LYMPH NODES: Peripancreatic lymphadenopathy. \par ABDOMINAL WALL: Anasarca. \par BONES: Degenerative changes. \par \par IMPRESSION: \par Cecal colitis may reflect typhlitis if patient is neutropenic. Small bowel ileus with probable enteritis. No bowel obstruction or free air. \par \par Pancreatic head mass consistent with patient's known pancreatic cancer status post CBD stent and pneumobilia. \par \par Age-indeterminate femorofemoral bypass occlusion.

## 2019-10-25 NOTE — ASSESSMENT
[______] : HCP: [unfilled] [FreeTextEntry1] : 68yoF with:\par \par 1. Metastatic pancreatic adenocarcinoma - To meet with Dr. Epps next week to discuss treatment. \par Prognosis poor given patient's downward trajectory and now poor PS. \par \par 2. Nausea/vomiting, chemo-related - C/w compazine.  May require ATC to achieve control temporarily.   Will start Dexamethasone taper today with the hopes of gaining nausea control.  May benefit from medical cannabis but prefers not to pursue at this time.   \par \par 3. Loss of appetite- encouraged to use protein supplements throughout. Course of steroid will hopefully promote appetite.\par \par 4. Encounter for palliative care/supportive care-  HCP in place - Allyson Garcia [phone]\par \par RTO 2 weeks, call sooner with issues

## 2019-10-28 RX ORDER — OXYCODONE 5 MG/1
5 TABLET ORAL
Qty: 90 | Refills: 0 | Status: ACTIVE | COMMUNITY
Start: 2019-09-04 | End: 1900-01-01

## 2019-10-30 ENCOUNTER — APPOINTMENT (OUTPATIENT)
Dept: INFUSION THERAPY | Facility: HOSPITAL | Age: 69
End: 2019-10-30

## 2019-11-01 ENCOUNTER — APPOINTMENT (OUTPATIENT)
Dept: HEMATOLOGY ONCOLOGY | Facility: CLINIC | Age: 69
End: 2019-11-01

## 2019-11-05 NOTE — DISCHARGE NOTE PROVIDER - NSDCDCMDCOMP_GEN_ALL_CORE
SOCIAL WORK NOTE:  DISCUSSED CASE AT LENGTH WITH CASE MANAGEMENT SHABANA HARRISON DISCUSSED THE NEED TO ADMIT DUE TO EXHORBITANT AMOUNT OF HOURS PATIENT HAS BEEN IN ED AND LACK OF RESPONSE X 3 DAYS FROM FAMILY. MADE MANAGER AWARE THERE ARE BED OFFERS HOWEVER FAMILY AND FINANCIALS NEEDED. CLINICALS SENT TO [facility] IN ORDER TO OBTAIN AUTHORIZATION OR DENIAL FOR POSSIBLE PLACEMENT. MANAGER AWARE THAT CASE COULD POTENTIALLY BE GUARDIANSHIP. This document is complete and the patient is ready for discharge.

## 2019-11-06 ENCOUNTER — APPOINTMENT (OUTPATIENT)
Dept: HEMATOLOGY ONCOLOGY | Facility: CLINIC | Age: 69
End: 2019-11-06

## 2019-11-06 ENCOUNTER — APPOINTMENT (OUTPATIENT)
Dept: INFUSION THERAPY | Facility: HOSPITAL | Age: 69
End: 2019-11-06

## 2019-11-13 ENCOUNTER — APPOINTMENT (OUTPATIENT)
Dept: INFUSION THERAPY | Facility: HOSPITAL | Age: 69
End: 2019-11-13

## 2019-11-13 ENCOUNTER — APPOINTMENT (OUTPATIENT)
Dept: HEMATOLOGY ONCOLOGY | Facility: CLINIC | Age: 69
End: 2019-11-13
Payer: COMMERCIAL

## 2019-11-13 VITALS
DIASTOLIC BLOOD PRESSURE: 85 MMHG | OXYGEN SATURATION: 98 % | SYSTOLIC BLOOD PRESSURE: 126 MMHG | TEMPERATURE: 97.8 F | RESPIRATION RATE: 14 BRPM | HEART RATE: 70 BPM

## 2019-11-13 DIAGNOSIS — C25.9 MALIGNANT NEOPLASM OF PANCREAS, UNSPECIFIED: ICD-10-CM

## 2019-11-13 PROCEDURE — 99214 OFFICE O/P EST MOD 30 MIN: CPT

## 2019-11-13 NOTE — PHYSICAL EXAM
[Normal] : affect appropriate [Completely disabled. Cannot carry on any self care. Totally confined to bed or chair] : Status 4- Completely disabled. Cannot carry on any self care. Totally confined to bed or chair [Cachectic] : cachectic [de-identified] : dry mm [de-identified] : dry  [de-identified] : R  port  [de-identified] : flat affect [de-identified] : somnolent

## 2019-11-13 NOTE — HISTORY OF PRESENT ILLNESS
[Disease: _____________________] : Disease: [unfilled] [AJCC Stage: ____] : AJCC Stage: [unfilled] [de-identified] : 68 year old female, with past history significant for HTN, Anemia, Smoking (1 pack x 2 weeks; smoked for 35 yrs; quit 2018), R leg PVD, Claudication of R-LE and RLE stent placement (2017; ambulatory with cane since this) who was found to have abnormal LFTs by PMD and send to ER (beginning of August 2019).  Prior to this, she would have 5-6 BM daily (June to July) weight loss of 47 pounds from March 2019 to June 2019, decreased oral intake, early satiety and persistent diarrhea.  She was admitted from 8/8-8/23/19 (transfer from  for EUS). She had EUS done 8/12/19 by Dr Farhan Pearson that showed duodenal mass with few peripancreatic LN enlarged, pancreatic head mass and CBD dilated to 15 mm.  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 IR placed external biliary drain due to elevated bilirubin and on 8/22/19 IR again did Biliary int stents and again assessed her ext drain- with plan to cap Ext drain 8/22/19 PM and if still successfully draining will keep caped or remove for discharge without patient f/u with Oncology at [facility] for treatment.  Patient presents to establish care on 8/26/19.  \par \par Sx:  denies \par FHx:  mother and youngest brother \par Social - lives with son; ex smoker; no a/c, no illicit; worked in a hotel  [de-identified] : negative for Gilbert's disease [FreeTextEntry1] : observation  [de-identified] : Jenn was brought in by her sister , she has not had therapy for more than 2 months due to hospitalization and decline in PS , comes today in wheelchair and can't keep her head up and spends all day in bed

## 2019-11-13 NOTE — REVIEW OF SYSTEMS
[Recent Change In Weight] : ~T recent weight change [Abdominal Pain] : abdominal pain [Difficulty Walking] : difficulty walking [Negative] : Endocrine [Fatigue] : fatigue [Dizziness] : dizziness [Muscle Weakness] : muscle weakness [Fever] : no fever [Chills] : no chills [Dysphagia] : no dysphagia [Chest Pain] : no chest pain [Odynophagia] : no odynophagia [Palpitations] : no palpitations [Lower Ext Edema] : no lower extremity edema [Shortness Of Breath] : no shortness of breath [Constipation] : no constipation [SOB on Exertion] : no shortness of breath during exertion [Joint Pain] : no joint pain [Dysuria] : no dysuria [Diarrhea] : no diarrhea [FreeTextEntry7] : nausea  [Skin Rash] : no skin rash

## 2019-11-20 ENCOUNTER — APPOINTMENT (OUTPATIENT)
Dept: INFUSION THERAPY | Facility: HOSPITAL | Age: 69
End: 2019-11-20

## 2019-11-20 ENCOUNTER — APPOINTMENT (OUTPATIENT)
Dept: HEMATOLOGY ONCOLOGY | Facility: CLINIC | Age: 69
End: 2019-11-20

## 2021-06-02 NOTE — H&P ADULT - NSHPPOAURINARYCATHETER_GEN_ALL_CORE
Quality 431: Preventive Care And Screening: Unhealthy Alcohol Use - Screening: Patient screened for unhealthy alcohol use using a single question and scores less than 2 times per year Detail Level: Detailed Quality 226: Preventive Care And Screening: Tobacco Use: Screening And Cessation Intervention: Patient screened for tobacco use and is an ex/non-smoker Quality 130: Documentation Of Current Medications In The Medical Record: Current Medications Documented no

## 2021-09-13 NOTE — PROGRESS NOTE ADULT - PROBLEM SELECTOR PLAN 3
Physical Therapy     Referred by: Boris Parks PA-C; Medical Diagnosis (from order):    Diagnosis Information      Diagnosis    715.11 (ICD-9-CM) - M19.012 (ICD-10-CM) - Primary osteoarthritis of left shoulder                Daily Treatment Note    Visit:  3     SUBJECTIVE                                                                                                               Continues to have some discomfort with her shoulder exercises, especially the raising one, but is having a difficulty time with remembering which one she is supposed to do.  Pain / Symptoms:  Pain rating (out of 10): Current: 5     OBJECTIVE                                                                                                                       Observation:   Comments / Details: On entrance able to raise arm to ~80 degrees    On exit able to raise arm up to ~110 degrees              TREATMENT                                                                                                                  Therapeutic Exercise:  Standing band row, orange theraband, 2 x 15    Seated yellow theraband double arm external rotation 2 x 10    Standing wall slide using towel 2 x 10 - this went okay, she did elect to have it added to her HEP    Active assisted elevation with eccentric flexion from max height tolerable 2 x 6        Therapeutic Activity:  Significant time with review of HEP - discussing appropriate HEP performance for tolerable and therapeutic benefit.    Manually assisted arm flexion 3 x 10 - first round with full passive, then next with lift off, then after full eccentric    Writer verbally educated and received verbal consent for hand placement, positioning of patient, and techniques to be performed today from patient for therapist position for techniques and hand placement and palpation for techniques as described above and how they are pertinent to the patient's plan of care.    Home Exercise Program/Education  Materials: Access Code: [email or web address]  URL: [email or web address]  Date: 09/02/2021  Prepared by: Benton Callahan    Exercises  Supine Shoulder Press AAROM in Abduction with Dowel - 2 x daily - 7 x weekly - 1 sets - 15 reps  Standing Shoulder External and Internal Rotation AROM - 2 x daily - 7 x weekly - 1 sets - 15 reps  Standing Row with Anchored Resistance - 2 x daily - 7 x weekly - 1 sets - 15 reps         ASSESSMENT                                                                                                             Exercises again modified to fit programming consistency for tolerable performance. She did respond very well to active assisted flexion with eccentric lowering. We'll monitor response and progress as able into full active motion as able. The patient would benefit from continued skilled intervention to address previous mentioned deficits.         PLAN                                                                                                                           Suggestions for next session as indicated: Progress per plan of care:  Progress from active assisted flexion, to full active flexion if able.  Increase band resistance to row and external rotation.  Continue to provide education and reinforce the long term need for progression.           Therapy procedure time and total treatment time can be found documented on the Time Entry flowsheet   sepsis d/t colitis, now resolved   -s/p Zosyn x 5 days.  will hold for now  -advance diet as tolerated

## 2021-09-17 NOTE — ED PROVIDER NOTE - NS_EDPROVIDERDISPOUSERTYPE_ED_A_ED
ICC Adult Note    CHIEF COMPLAINT:    Chief Complaint   Patient presents with   • UTI     urinary frequency, pain with urination x 2 days.   Patient was seen wearing a mask and I was in PPE including cap goggles face mask and gloves.  Patient examined at 10:22 AM    HPI:    HPI   This is 71 year old female who presented to the immediate care with the history of having had the onset 2 days ago of some mild discomfort across the lower back associated with urinary frequency and a follow odor to the urine but without significant dysuria.  Voiding in small amounts frequently.  She did have hematuria once yesterday.  No abdominal pain.  Some nausea but no vomiting or diarrhea.  No fevers chills.  No headache visual disturbances cough upper respiratory infection chest pain shortness of breath.  No skin rash, bleeding or bruising problems, polydipsia polyuria polyphagia.  This feels similar to previous urinary tract infections which she gets fairly frequently and for which she has seen urology and had cystoscopy.        REVIEW OF SYSTEMS:      See above for constitutional, neurologic, eyes, ENT, respiratory, cardiovascular, GI, genitourinary, musculoskeletal, hematologic, skin, endocrine    ALLERGIES:    ALLERGIES:   Allergen Reactions   • Methyldopa RASH   • Sulfa Antibiotics RASH       CURRENT MEDICATIONS:    Current Outpatient Medications   Medication Sig Dispense Refill   • cephalexin 500 MG tablet Take 1 tablet by mouth 3 times daily for 7 days. 21 tablet 0   • Calcium Gluconate 500 MG Cap Take 500 mg by mouth.     • DENOSUMAB SC      • ENALAPRIL MALEATE PO Take 10 mg by mouth.     • MULTIPLE VITAMINS-MINERALS PO      • risedronate (ACTONEL) 150 MG tablet      • MULTIPLE VITAMIN PO Take 1 tablet by mouth daily.     • calcium citrate/vit D (CITRACAL + D) 315-200 MG-UNIT per tablet      • DENOSumab (Xgeva) 120 MG/1.7ML Solution      • enalapril (VASOTEC) 20 MG tablet Take 20 mg by mouth daily.     • Afinitor 7.5 MG Tab  tablet      • gabapentin (NEURONTIN) 100 MG capsule Take 200 mg by mouth.     • hydrochlorothiazide (HYDRODIURIL) 25 MG tablet TAKE 1 TABLET BY MOUTH Daily     • omeprazole (PriLOSEC) 40 MG capsule Take 40 mg by mouth.     • ondansetron (ZOFRAN) 8 MG tablet Take 8 mg by mouth every 24 hours.     • simvastatin (ZOCOR) 10 MG tablet Take 10 mg by mouth.     • zolpidem (AMBIEN) 5 MG tablet Take 5 mg by mouth.     • FULVESTRANT IM        No current facility-administered medications for this visit.       PAST MEDICAL HISTORY:    Past Medical History:   Diagnosis Date   • Essential (primary) hypertension    • GERD (gastroesophageal reflux disease)    • High cholesterol    • Malignant neoplasm (CMS/HCC)        SURGICAL HISTORY:    Past Surgical History:   Procedure Laterality Date   • Appendectomy     • Breast surgery     • Cystoscopy         SOCIAL HISTORY:    Social History     Tobacco Use   • Smoking status: Never Smoker   • Smokeless tobacco: Never Used       FAMILY HISTORY: Noncontributory to complaint  History reviewed. No pertinent family history.    PHYSICAL EXAM:   Visit Vitals  BP (!) 144/88   Pulse (!) 103   Temp 99.1 °F (37.3 °C) (Tympanic)   Resp 20   Wt 61.7 kg (136 lb)   SpO2 97%   BMI 21.95 kg/m²       Pulse oximetry is 97% on room air consider normal.   Physical Exam  Vitals reviewed.   Constitutional:       General: She is not in acute distress.     Appearance: Normal appearance. She is not ill-appearing, toxic-appearing or diaphoretic.   HENT:      Right Ear: Tympanic membrane, ear canal and external ear normal.      Left Ear: Tympanic membrane, ear canal and external ear normal.   Eyes:      Extraocular Movements: Extraocular movements intact.      Conjunctiva/sclera: Conjunctivae normal.      Pupils: Pupils are equal, round, and reactive to light.   Cardiovascular:      Rate and Rhythm: Normal rate and regular rhythm.      Pulses: Normal pulses.      Heart sounds: Normal heart sounds. No murmur heard.    No gallop.    Pulmonary:      Effort: Pulmonary effort is normal. No respiratory distress.      Breath sounds: Normal breath sounds. No stridor. No wheezing, rhonchi or rales.   Chest:      Chest wall: No tenderness.   Abdominal:      General: Abdomen is flat. Bowel sounds are normal. There is no distension.      Palpations: Abdomen is soft. There is no mass.      Tenderness: There is no abdominal tenderness. There is no right CVA tenderness, left CVA tenderness, guarding or rebound.   Musculoskeletal:      Cervical back: Normal range of motion and neck supple. No rigidity or tenderness. No muscular tenderness.      Comments: Extremity exam: Normal to inspection, Pulses equal, No cyanosis clubbing or edema. No calf swelling or tenderness, negative Homans' signs   Lymphadenopathy:      Cervical: No cervical adenopathy.   Skin:     General: Skin is warm and dry.      Capillary Refill: Capillary refill takes less than 2 seconds.      Findings: No rash.   Neurological:      Mental Status: She is alert.          RADIOLOGY AND LAB RESULTS:    Results for orders placed or performed in visit on 09/17/21   URINALYSIS, MACROSCOPIC - POINT OF CARE   Result Value    COLOR - POINT OF CARE Yellow    APPEARANCE, URINALYSIS - POINT OF CARE Cloudy    GLUCOSE, URINALYSIS - POINT OF CARE Negative    BILIRUBIN, URINALYSIS - POINT OF CARE Negative    KETONES, URINALYSIS - POINT OF CARE Negative    SPECIFIC GRAVITY, URINALYSIS - POINT OF CARE 1.025    OCCULT BLOOD, URINALYSIS - POINT OF CARE Moderate (A)    PH, URINALYSIS - POINT OF CARE 6.0    PROTEIN, URINALYSIS - POINT OF CARE 100  (A)    UROBILINOGEN, URINALYSIS - POINT OF CARE 0.2    NITRITE, URINALYSIS - POINT OF CARE Negative    WBC ESTERASE, URINALYSIS - POINT OF CARE Trace (A)   Culture sent    No orders to display       No image results found.         Procedure:  [unfilled]      MEDICAL DECISION MAKING: Reexamination 10:50 AM heart rate now within normal at 92.  No fever so  no signs of sepsis criteria.  Will treat with Keflex and have follow-up with the PMD.  The need to follow this up because of the hematuria to ensure that it resolves was discussed with the patient and she understands that otherwise this could represent kidney stone or tumor.  The need to go immediately to the emergency department for any severe abdominal flank or back pain and especially for fevers chills or vomiting was discussed with the patient who demonstrates understanding      DIAGNOSIS:    ED Diagnosis   1. Acute UTI  URINE, BACTERIAL CULTURE   2. Hematuria, microscopic          PLAN:        Bart Alvarez MD  9/17/2021   Attending Attestation (For Attendings USE Only)...

## 2022-11-01 NOTE — PROGRESS NOTE ADULT - SUBJECTIVE AND OBJECTIVE BOX
HPI:  68F former smoker, htn, pw 2 months of diarrhea and abnl lfts per pmd. patient notes the stool has been lightening. she goes 5 times daily, always loose. she has ruq  abd pain, fever, chills, numbness, rash, bleeding, dysuria, ha, vision loss, rhinorrhea. nothing was given for symptoms (05 Aug 2019 18:55)    Patient is a 68y old  Female who presents with a chief complaint of abdominal pain (07 Aug 2019 10:41)      INTERVAL HPI/OVERNIGHT EVENTS: no acute evernts overnight  hungry     MEDICATIONS  (STANDING):  amLODIPine   Tablet 10 milliGRAM(s) Oral daily  cefTRIAXone   IVPB 1000 milliGRAM(s) IV Intermittent every 24 hours  cilostazol 50 milliGRAM(s) Oral two times a day  metoclopramide Injectable 5 milliGRAM(s) IV Push three times a day  morphine  - Injectable 4 milliGRAM(s) IV Push every 6 hours    MEDICATIONS  (PRN):  ALBUTerol    90 MICROgram(s) HFA Inhaler 1 Puff(s) Inhalation four times a day PRN Shortness of Breath and/or Wheezing  ondansetron Injectable 4 milliGRAM(s) IV Push every 6 hours PRN Nausea and/or Vomiting      Allergies    No Known Allergies    Intolerances        REVIEW OF SYSTEMS:  CONSTITUTIONAL fatigue  EYES: No eye pain, visual disturbances, or discharge  ENMT:  No difficulty hearing, tinnitus, vertigo; No sinus or throat pain  NECK: No pain or stiffness  BREASTS: No pain, masses, or nipple discharge  RESPIRATORY: No cough, wheezing, chills or hemoptysis; No shortness of breath  CARDIOVASCULAR: No chest pain, palpitations, dizziness, or leg swelling  GASTROINTESTINAL:   nausea, vomiting, or hematemesis; No diarrhea or constipation. No melena or hematochezia.  GENITOURINARY: No dysuria, frequency, hematuria, or incontinence  NEUROLOGICAL: No headaches, memory loss, loss of strength, numbness, or tremors  SKIN: No itching, burning, rashes, or lesions   LYMPH NODES: No enlarged glands  ENDOCRINE: No heat or cold intolerance; No hair loss  MUSCULOSKELETAL: No joint pain or swelling; No muscle, back, or extremity pain  PSYCHIATRIC: No depression, anxiety, mood swings, or difficulty sleeping  HEME/LYMPH: No easy bruising, or bleeding gums  ALLERGY AND IMMUNOLOGIC: No hives or eczema    Vital Signs Last 24 Hrs  T(C): 37.2 (07 Aug 2019 11:43), Max: 37.2 (07 Aug 2019 11:43)  T(F): 98.9 (07 Aug 2019 11:43), Max: 98.9 (07 Aug 2019 11:43)  HR: 50 (07 Aug 2019 11:43) (50 - 95)  BP: 97/59 (07 Aug 2019 11:43) (97/59 - 122/63)  BP(mean): --  RR: 18 (07 Aug 2019 11:43) (17 - 18)  SpO2: 96% (07 Aug 2019 11:43) (96% - 98%)    PHYSICAL EXAM:  GENERAL: NAD, well-groomed, well-developed  HEAD:  Atraumatic, Normocephalic  EYES: EOMI, PERRLA, conjunctiva and sclera clear  ENMT: No tonsillar erythema, exudates, or enlargement; Moist mucous membranes, Good dentition, No lesions  NECK: Supple, No JVD, Normal thyroid  NERVOUS SYSTEM:  Alert & Oriented X3, Good concentration; Motor Strength 5/5 B/L upper and lower extremities; DTRs 2+ intact and symmetric  CHEST/LUNG: Clear to percussion bilaterally; No rales, rhonchi, wheezing, or rubs  HEART: Regular rate and rhythm; No murmurs, rubs, or gallops  ABDOMEN: Soft, epigastric tenderness  Nondistended; Bowel sounds present  EXTREMITIES:  2+ Peripheral Pulses, No clubbing, cyanosis, or edema  LYMPH: No lymphadenopathy noted  SKIN: No rashes or lesions    LABS:                        9.7    6.12  )-----------( 273      ( 07 Aug 2019 07:17 )             29.9         142  |  106  |  6<L>  ----------------------------<  70  2.9<LL>   |  23  |  0.58    Ca    8.1<L>      07 Aug 2019 07:17  Mg     1.9         TPro  6.0  /  Alb  2.4<L>  /  TBili  5.3<H>  /  DBili  4.32<H>  /  AST  95<H>  /  ALT  123<H>  /  AlkPhos  725<H>      PT/INR - ( 05 Aug 2019 17:29 )   PT: 12.2 sec;   INR: 1.09 ratio         PTT - ( 05 Aug 2019 17:29 )  PTT:30.2 sec  Urinalysis Basic - ( 06 Aug 2019 07:27 )    Color: Brown / Appearance: very cloudy / S.020 / pH: x  Gluc: x / Ketone: Small  / Bili: Large / Urobili: 8 mg/dL   Blood: x / Protein: 100 mg/dL / Nitrite: Positive   Leuk Esterase: Moderate / RBC: 6-10 /HPF / WBC >50   Sq Epi: x / Non Sq Epi: Moderate / Bacteria: TNTC      CAPILLARY BLOOD GLUCOSE          RADIOLOGY & ADDITIONAL TESTS:  < from: MR MRCP No Cont (19 @ 17:42) >  IMPRESSION: Intra and extrahepatic biliary ductal dilatation as seen on   the ultrasound. There is also pancreatic ductal dilatation. No definite   choledocholithiasis. Further evaluation with contrast-enhanced CT or MR   of the abdomen for evaluation of possible pancreatic mass is suggested.  Multiple gallstones are identified as seen on the ultrasound    < end of copied text >    Imaging Personally Reviewed:  [X ] YES  [ ] NO    Consultant(s) Notes Reviewed:  [ X] YES  [ ] NO    Care Discussed with Consultants/Other Providers [ ]X YES  [ ] NO
Patient is a 68y old  Female who presents with a chief complaint of abdominal pain (05 Aug 2019 22:13), she has no pain.       OVERNIGHT EVENTS: none    MEDICATIONS  (STANDING):  amLODIPine   Tablet 10 milliGRAM(s) Oral daily  cefTRIAXone   IVPB 1000 milliGRAM(s) IV Intermittent every 24 hours  cilostazol 50 milliGRAM(s) Oral two times a day  metoclopramide Injectable 5 milliGRAM(s) IV Push three times a day  morphine  - Injectable 4 milliGRAM(s) IV Push every 6 hours  sodium chloride 0.9%. 1000 milliLiter(s) (75 mL/Hr) IV Continuous <Continuous>    MEDICATIONS  (PRN):  ALBUTerol    90 MICROgram(s) HFA Inhaler 1 Puff(s) Inhalation four times a day PRN Shortness of Breath and/or Wheezing  ondansetron Injectable 4 milliGRAM(s) IV Push every 6 hours PRN Nausea and/or Vomiting        REVIEW OF SYSTEMS:  CONSTITUTIONAL: (+) fatigue  EYES: No eye pain,    ENMT:  No difficulty hearing,   NECK: No pain or stiffness  RESPIRATORY: No cough, No shortness of breath  CARDIOVASCULAR: No chest pain,    GASTROINTESTINAL: No abdominal or epigastric pain.   GENITOURINARY: No dysuria,    NEUROLOGICAL: No headaches.  SKIN: No itching, burning, rashes, or lesions      Vital Signs Last 24 Hrs  T(C): 36.4 (06 Aug 2019 12:26), Max: 36.8 (05 Aug 2019 17:26)  T(F): 97.5 (06 Aug 2019 12:26), Max: 98.2 (05 Aug 2019 17:26)  HR: 50 (06 Aug 2019 12:26) (50 - 86)  BP: 99/59 (06 Aug 2019 12:26) (99/59 - 140/65)  BP(mean): --  RR: 18 (06 Aug 2019 12:26) (16 - 18)  SpO2: 97% (06 Aug 2019 12:26) (97% - 99%)    PHYSICAL EXAM:  GENERAL: NAD, well-groomed, well-developed  HEAD:  Atraumatic, Normocephalic  EYES: (+) scleral icterus.  ENMT: No tonsillar erythema, exudates, or enlargement; Moist mucous membranes   NECK: Supple, No JVD   NERVOUS SYSTEM:  Alert & Oriented X3, Good concentration; Motor Strength 5/5 B/L upper and lower extremities; DTRs 2+ intact and symmetric  CHEST/LUNG: Clear to auscultation  bilaterally; No rales, rhonchi, wheezing, or rubs  HEART: Regular rate and rhythm; No murmurs, rubs, or gallops  ABDOMEN: Soft, Nontender, Nondistended; Bowel sounds present  EXTREMITIES:  2+ Peripheral Pulses, No clubbing, cyanosis, or edema  LYMPH: No lymphadenopathy noted  SKIN: Jaundice noted    LABS:                        9.7    5.40  )-----------( 275      ( 06 Aug 2019 07:23 )             30.0     08-05    139  |  103  |  5<L>  ----------------------------<  105<H>  3.5   |  27  |  0.63    Ca    8.7      05 Aug 2019 17:29  Mg     1.9     08-05    TPro  6.1  /  Alb  2.4<L>  /  TBili  4.5<H>  /  DBili  3.64<H>  /  AST  104<H>  /  ALT  134<H>  /  AlkPhos  754<H>  08-06    PT/INR - ( 05 Aug 2019 17:29 )   PT: 12.2 sec;   INR: 1.09 ratio         PTT - ( 05 Aug 2019 17:29 )  PTT:30.2 sec   cardiac markers   Urinalysis Basic - ( 06 Aug 2019 07:27 )    Color: Brown / Appearance: very cloudy / S.020 / pH: x  Gluc: x / Ketone: Small  / Bili: Large / Urobili: 8 mg/dL   Blood: x / Protein: 100 mg/dL / Nitrite: Positive   Leuk Esterase: Moderate / RBC: 6-10 /HPF / WBC >50   Sq Epi: x / Non Sq Epi: Moderate / Bacteria: TNTC      CAPILLARY BLOOD GLUCOSE        Cultures    RADIOLOGY & ADDITIONAL TESTS:    Imaging Personally Reviewed:  [ ] YES  [ ] NO    Consultant(s) Notes Reviewed:  [*] YES  [ ] NO    Care Discussed with Consultants/Other Providers [ ] YES  [ ] NO
No changes - still jaundiced  Enhanced contrast CT shows mass in head of pancreas    < from: CT Abdomen and Pelvis w/ IV Cont (08.07.19 @ 14:56) >  IMPRESSION:     Hypoenhancing 3 x 3.3 cm solid mass in the pancreatic head, concerning   for an adenocarcinoma. It is inseparable from the adjacent duodenum.   Details are as above.        MEDICATIONS  (STANDING):  amLODIPine   Tablet 10 milliGRAM(s) Oral daily  cefTRIAXone   IVPB 1000 milliGRAM(s) IV Intermittent every 24 hours  cilostazol 50 milliGRAM(s) Oral two times a day  enoxaparin Injectable 40 milliGRAM(s) SubCutaneous daily  magnesium sulfate  IVPB 2 Gram(s) IV Intermittent once  morphine  - Injectable 4 milliGRAM(s) IV Push every 6 hours    MEDICATIONS  (PRN):  ALBUTerol    90 MICROgram(s) HFA Inhaler 1 Puff(s) Inhalation four times a day PRN Shortness of Breath and/or Wheezing  ondansetron Injectable 4 milliGRAM(s) IV Push every 6 hours PRN Nausea and/or Vomiting      Allergies    No Known Allergies    Intolerances        Vital Signs Last 24 Hrs  T(C): 37.1 (08 Aug 2019 11:58), Max: 37.2 (07 Aug 2019 17:04)  T(F): 98.7 (08 Aug 2019 11:58), Max: 99 (07 Aug 2019 17:04)  HR: 83 (08 Aug 2019 11:58) (67 - 83)  BP: 127/80 (08 Aug 2019 11:58) (120/73 - 149/69)  BP(mean): --  RR: 18 (08 Aug 2019 11:58) (17 - 18)  SpO2: 99% (08 Aug 2019 11:58) (97% - 99%)    PHYSICAL EXAM:  General: NAD.  CVS: S1, S2  Chest: air entry bilaterally present  Abd: BS present, soft, non-tender      LABS:                        10.3   5.16  )-----------( 294      ( 08 Aug 2019 07:31 )             31.7     08-08    139  |  104  |  3<L>  ----------------------------<  86  4.1   |  26  |  0.59    Ca    8.4<L>      08 Aug 2019 07:31  Phos  2.6     08-08  Mg     1.5     08-08    TPro  6.3  /  Alb  2.4<L>  /  TBili  5.8<H>  /  DBili  4.47<H>  /  AST  107<H>  /  ALT  122<H>  /  AlkPhos  766<H>  08-08        spoke with Dr Chavez at [facility] - Pt needs transfer to [facility] for EUS and possible biopsy of pancreatic mass  Will contact hospitalist to arrange transfer
Patient is a 68y old  Female who presents with a chief complaint of abdominal pain (08 Aug 2019 09:53)      INTERVAL HPI/OVERNIGHT EVENTS: no acute events overnight     MEDICATIONS  (STANDING):  amLODIPine   Tablet 10 milliGRAM(s) Oral daily  cefTRIAXone   IVPB 1000 milliGRAM(s) IV Intermittent every 24 hours  cilostazol 50 milliGRAM(s) Oral two times a day  enoxaparin Injectable 40 milliGRAM(s) SubCutaneous daily  magnesium sulfate  IVPB 2 Gram(s) IV Intermittent once  morphine  - Injectable 4 milliGRAM(s) IV Push every 6 hours    MEDICATIONS  (PRN):  ALBUTerol    90 MICROgram(s) HFA Inhaler 1 Puff(s) Inhalation four times a day PRN Shortness of Breath and/or Wheezing  ondansetron Injectable 4 milliGRAM(s) IV Push every 6 hours PRN Nausea and/or Vomiting      Allergies    No Known Allergies    Intolerances        REVIEW OF SYSTEMS:  CONSTITUTIONAL:  hungry   EYES: No eye pain, visual disturbances, or discharge  ENMT:  No difficulty hearing, tinnitus, vertigo; No sinus or throat pain  NECK: No pain or stiffness  BREASTS: No pain, masses, or nipple discharge  RESPIRATORY: No cough, wheezing, chills or hemoptysis; No shortness of breath  CARDIOVASCULAR: No chest pain, palpitations, dizziness, or leg swelling  GASTROINTESTINAL: No abdominal or epigastric pain. No nausea, vomiting, or hematemesis; No diarrhea or constipation. No melena or hematochezia.  GENITOURINARY: No dysuria, frequency, hematuria, or incontinence  NEUROLOGICAL: No headaches, memory loss, loss of strength, numbness, or tremors  SKIN: No itching, burning, rashes, or lesions   LYMPH NODES: No enlarged glands  ENDOCRINE: No heat or cold intolerance; No hair loss  MUSCULOSKELETAL: No joint pain or swelling; No muscle, back, or extremity pain  PSYCHIATRIC: No depression, anxiety, mood swings, or difficulty sleeping  HEME/LYMPH: No easy bruising, or bleeding gums  ALLERGY AND IMMUNOLOGIC: No hives or eczema    Vital Signs Last 24 Hrs  T(C): 36.6 (08 Aug 2019 05:01), Max: 37.2 (07 Aug 2019 17:04)  T(F): 97.9 (08 Aug 2019 05:01), Max: 99 (07 Aug 2019 17:04)  HR: 67 (08 Aug 2019 05:01) (67 - 82)  BP: 149/69 (08 Aug 2019 05:01) (120/73 - 149/69)  BP(mean): --  RR: 17 (08 Aug 2019 05:01) (17 - 18)  SpO2: 98% (08 Aug 2019 05:01) (97% - 98%)    PHYSICAL EXAM:  GENERAL: NAD, well-groomed, well-developed  HEAD:  Atraumatic, Normocephalic  EYES: EOMI, PERRLA, conjunctiva and sclera clear  ENMT: No tonsillar erythema, exudates, or enlargement; Moist mucous membranes, Good dentition, No lesions  NECK: Supple, No JVD, Normal thyroid  NERVOUS SYSTEM:  Alert & Oriented X3, Good concentration; Motor Strength 5/5 B/L upper and lower extremities; DTRs 2+ intact and symmetric  CHEST/LUNG: Clear to percussion bilaterally; No rales, rhonchi, wheezing, or rubs  HEART: Regular rate and rhythm; No murmurs, rubs, or gallops  ABDOMEN: Soft, Nontender, Nondistended; Bowel sounds present  EXTREMITIES:  2+ Peripheral Pulses, No clubbing, cyanosis, or edema  LYMPH: No lymphadenopathy noted  SKIN: No rashes or lesions    LABS:                        10.3   5.16  )-----------( 294      ( 08 Aug 2019 07:31 )             31.7     08-08    139  |  104  |  3<L>  ----------------------------<  86  4.1   |  26  |  0.59    Ca    8.4<L>      08 Aug 2019 07:31  Phos  2.6     08-08  Mg     1.5     08-08    TPro  6.3  /  Alb  2.4<L>  /  TBili  5.8<H>  /  DBili  4.47<H>  /  AST  107<H>  /  ALT  122<H>  /  AlkPhos  766<H>  08-08        RADIOLOGY & ADDITIONAL TESTS:  < from: CT Abdomen and Pelvis w/ IV Cont (08.07.19 @ 14:56) >  Hypoenhancing 3 x 3.3 cm solid mass in the pancreatic head, concerning   for an adenocarcinoma. It is inseparable from the adjacent duodenum.   Details are as above.    Diffuse biliary ductal dilatation. Multiple large gallstones.    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 The patency of the femorofemoral graft cannot be determined on   this exam. Clinical correlation is recommended.    < end of copied text >    Imaging Personally Reviewed:  [ X] YES  [ ] NO    Consultant(s) Notes Reviewed:  [X ] YES  [ ] NO    Care Discussed with Consultants/Other Providers [X ] YES  [ ] NO
Pt stable - still jaundiced      MEDICATIONS  (STANDING):  amLODIPine   Tablet 10 milliGRAM(s) Oral daily  cefTRIAXone   IVPB 1000 milliGRAM(s) IV Intermittent every 24 hours  cilostazol 50 milliGRAM(s) Oral two times a day  metoclopramide Injectable 5 milliGRAM(s) IV Push three times a day  morphine  - Injectable 4 milliGRAM(s) IV Push every 6 hours  sodium chloride 0.9%. 1000 milliLiter(s) (75 mL/Hr) IV Continuous <Continuous>    MEDICATIONS  (PRN):  ALBUTerol    90 MICROgram(s) HFA Inhaler 1 Puff(s) Inhalation four times a day PRN Shortness of Breath and/or Wheezing  ondansetron Injectable 4 milliGRAM(s) IV Push every 6 hours PRN Nausea and/or Vomiting      Allergies    No Known Allergies    Intolerances        Vital Signs Last 24 Hrs  T(C): 36.4 (06 Aug 2019 12:26), Max: 36.5 (06 Aug 2019 00:00)  T(F): 97.5 (06 Aug 2019 12:26), Max: 97.7 (06 Aug 2019 00:00)  HR: 50 (06 Aug 2019 12:26) (50 - 69)  BP: 99/59 (06 Aug 2019 12:26) (99/59 - 140/65)  BP(mean): --  RR: 18 (06 Aug 2019 12:26) (16 - 18)  SpO2: 97% (06 Aug 2019 12:26) (97% - 98%)    PHYSICAL EXAM:  General: NAD.  CVS: S1, S2  Chest: air entry bilaterally present  Abd: BS present, soft, non-tender      LABS:                        9.7    5.40  )-----------( 275      ( 06 Aug 2019 07:23 )             30.0     08-05    139  |  103  |  5<L>  ----------------------------<  105<H>  3.5   |  27  |  0.63    Ca    8.7      05 Aug 2019 17:29  Mg     1.9     08-05    TPro  6.1  /  Alb  2.4<L>  /  TBili  4.5<H>  /  DBili  3.64<H>  /  AST  104<H>  /  ALT  134<H>  /  AlkPhos  754<H>  08-06    PT/INR - ( 05 Aug 2019 17:29 )   PT: 12.2 sec;   INR: 1.09 ratio         PTT - ( 05 Aug 2019 17:29 )  PTT:30.2 sec    < from: MR MRCP No Cont (08.06.19 @ 17:42) >  IMPRESSION: Intra and extrahepatic biliary ductal dilatation as seen on   the ultrasound. There is also pancreatic ductal dilatation. No definite   choledocholithiasis. Further evaluation with contrast-enhanced CT or MR   of the abdomen for evaluation of possible pancreatic mass is suggested.  Multiple gallstones are identified as seen on the ultrasound    < end of copied text >    CA 19-9  will discuss with radiology as to best test for better visualization of the pancreas
Pt still with dark urine    MR MRCP No Cont (08.06.19 @ 17:42) >    IMPRESSION: Intra and extrahepatic biliary ductal dilatation as seen on   the ultrasound. There is also pancreatic ductal dilatation. No definite   choledocholithiasis. Further evaluation with contrast-enhanced CT or MR   of the abdomen for evaluation of possible pancreatic mass is suggested.  Multiple gallstones are identified as seen on the ultrasound    MRCP was ordered by me with contrast but order was later cancelled - not sure why  Based on above findings needs contrast enhanced CT to look at pancreas    MEDICATIONS  (STANDING):  amLODIPine   Tablet 10 milliGRAM(s) Oral daily  cefTRIAXone   IVPB 1000 milliGRAM(s) IV Intermittent every 24 hours  cilostazol 50 milliGRAM(s) Oral two times a day  metoclopramide Injectable 5 milliGRAM(s) IV Push three times a day  morphine  - Injectable 4 milliGRAM(s) IV Push every 6 hours    MEDICATIONS  (PRN):  ALBUTerol    90 MICROgram(s) HFA Inhaler 1 Puff(s) Inhalation four times a day PRN Shortness of Breath and/or Wheezing  ondansetron Injectable 4 milliGRAM(s) IV Push every 6 hours PRN Nausea and/or Vomiting      Allergies    No Known Allergies    Intolerances        Vital Signs Last 24 Hrs  T(C): 37.1 (07 Aug 2019 05:17), Max: 37.1 (07 Aug 2019 05:17)  T(F): 98.8 (07 Aug 2019 05:17), Max: 98.8 (07 Aug 2019 05:17)  HR: 95 (07 Aug 2019 05:17) (50 - 95)  BP: 116/73 (07 Aug 2019 05:17) (99/59 - 122/63)  BP(mean): --  RR: 18 (07 Aug 2019 05:17) (17 - 18)  SpO2: 98% (07 Aug 2019 05:17) (97% - 98%)    PHYSICAL EXAM:  General: NAD.  CVS: S1, S2  Chest: air entry bilaterally present  Abd: BS present, soft, non-tender      LABS:                        9.7    6.12  )-----------( 273      ( 07 Aug 2019 07:17 )             29.9     08-07    142  |  106  |  6<L>  ----------------------------<  70  2.9<LL>   |  23  |  0.58    Ca    8.1<L>      07 Aug 2019 07:17  Mg     1.9     08-05    TPro  6.0  /  Alb  2.4<L>  /  TBili  5.3<H>  /  DBili  4.32<H>  /  AST  95<H>  /  ALT  123<H>  /  AlkPhos  725<H>  08-07    PT/INR - ( 05 Aug 2019 17:29 )   PT: 12.2 sec;   INR: 1.09 ratio         PTT - ( 05 Aug 2019 17:29 )  PTT:30.2 sec    Cancer Antigen, GI Ca 19-9: 53:      Discussed with Radiology - will order enhanced contrast CT Abd
related to increased physiologic demand for nutrient to promote healing

## 2022-11-04 NOTE — H&P ADULT - PROBLEM SELECTOR PROBLEM 1
[facility]   Physical Therapy Treatment Note    Name: Kathi Baker Matlock  Clinic Number: [id number]    Therapy Diagnosis:   Encounter Diagnoses   Name Primary?    Decreased functional mobility and endurance Yes    Acute midline low back pain without sciatica        Physician: Clare Pan NP    Visit Date: 11/4/2022    Physician Orders: PT Eval and Treat   Medical Diagnosis from Referral: dorsalgia   Evaluation Date: 10/18/2022  Authorization Period Expiration: 10/18/2023  Plan of Care Expiration: 01/18/2023  Visit # / Visits authorized: 5/ 12  FOTO Visit #:  1/3    PTA Visit #: -0-/5     Time In: 1:50 PM  Time Out: 2:40 PM  Total Billable Time: 40 minutes    SUBJECTIVE     Pt reports: No new c/o's.. Back pain is getting less   She was not compliant with home exercise program as one has not been issued yet.  Response to previous treatment: N/A   Functional change: N/A    Pain: 3/10  Location: bilateral back      OBJECTIVE     Will provide during PN    Treatment     Kathi received the treatments listed below:      therapeutic exercises to develop strength, endurance, and flexibility for 40 minutes including:  Recumbent Bike level 5 x 10 min  Seated Lumbar flex w/ PB x 2 min  Seated H/S stretches 3 x 30 sec  Pelvic Tilts x 20  Bridges x 15  Ball Squeezes x 3 min  SLR x 10x 2  S/L hip abduction x 15 each   S/L clams x 15 each   DKC w/ PB x 2 min  LTR w/ PB x 2 min   Supine Knee fallouts x 15 added YTB  Prone lumbar extension x 10  Cat/Camel x 6    Manual tx to thoracic/lumbar spine x 12 mins: grade II to III extension mobilization to T6 through T9; paraspinals skin rolling; S/L flexion to left T-12 and L1/L2       Patient Education and Home Exercises     Home Exercises Provided and Patient Education Provided     Education provided: Progression in Therapy; Also discussed with the pt's       Written Home Exercises Provided: Exercises were reviewed and Kathi was able to demonstrate them  prior to the end of the session.  Kathi demonstrated good  understanding of the education provided. See EMR under Patient Instructions for exercises provided during therapy sessions    ASSESSMENT     Patient did well with exercises; tenderness at mid-thoracic area today - responded well to manual mobilization of thoracic spine;  VC's for technique and to remain on task.  Pt is expected to reach all goals by anticipated DC.    Kathi Is progressing well towards her goals.   Pt prognosis is Fair.     Pt will continue to benefit from skilled outpatient physical therapy to address the deficits listed in the problem list box on initial evaluation, provide pt/family education and to maximize pt's level of independence in the home and community environment.     Pt's spiritual, cultural and educational needs considered and pt agreeable to plan of care and goals.     Anticipated barriers to physical therapy: None    Goals:   Short Term Goals: 3 weeks   Able to communicate reduction in lower back pain with performance of daily activities MET  Able to demonstrate improved lumbar AROM without any increase in back pain MET  Compliant with HEP progressing     Long Term Goals: 6 weeks   Patient able to report 0/10 pain in her lower back with daily activities PROGRESSING  Able to walk x 30 mins with  without reported pain PROGRESSING  FOTO score limitation improved to 15% or less PROGRESSING  Independent with HEP for continued improvement in function PROGRESSING    PLAN     Continue per POC and progress with strengthening/mobility exercises as patient tolerates. Consider progressing pt to treadmill as appropriate and advance core stabilization activities.     Meena Ramirez, PT                 Sepsis

## 2023-06-13 NOTE — PATIENT PROFILE ADULT - NSPROPASSIVESMOKEEXPOSURE_GEN_A_NUR
Mi Andersen   MRN: [id number]    Department:  [facility] Emergency Department   Date of Visit:  9/1/2017           Disclosure     Insurance plans vary and the physician(s) referred by the ER may not be covered by your plan.  Please contact yo CARE PHYSICIAN AT ONCE OR RETURN IMMEDIATELY TO THE EMERGENCY DEPARTMENT. If you have been prescribed any medication(s), please fill your prescription right away and begin taking the medication(s) as directed.   If you believe that any of the medications No Topical Metronidazole Counseling: Metronidazole is a topical antibiotic medication. You may experience burning, stinging, redness, or allergic reactions.  Please call our office if you develop any problems from using this medication.

## 2023-11-03 NOTE — ED ADULT NURSE REASSESSMENT NOTE - NS ED NURSE REASSESS COMMENT FT1
Received report from byron BRAVO. pt Aox3, daughter at bedside. Pt appears in NAD, breathing even and unlabored b/l, will continue to monitor.
pt c/o lower abdominal pain. pt states she thinks it may be bc she is hungry. MD Wright notified and pt ok to advance to clear liquid diet and then will reassess pain
receiving pt from night RN. pt aox3. pt reports improvement in weakness. pt denies headache, chest pain, sob, abdominal pain, n/v. pt admitted to medicine, awaiting bed.
pt reports improvement in pain and nausea
break coverage RN: pt return from CT, voided urine on bedpan, UA, urine culture sent. comfort and safety measures provided. pt appears in no distress at this time.
04-Nov-2023 16:00

## 2024-04-04 NOTE — H&P ADULT - NEGATIVE MUSCULOSKELETAL SYMPTOMS
Nia is a great kid.   She has lost weight since her last visit.  I am glad she is doing well on the BCP and her period is more regular.  I will refill her BCP for the next 7 months until I see her in November.  Please call me if you need to be seen sooner.  We have a same day walk in clinic and I work in this clinic several times per week.  I am going to check her for anemia and blood work for STD screening. I will call you with these results.  I am glad you have a dermatology appt.  Use the hydrocortisone on the rash on the left side of her face 1-2 times per day for a week to see if it gets better.  Stop if you do not see a difference.   Condoms given.  I hope she has a great time at prom and graduation.  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   
no back pain/no neck pain

## 2024-08-13 NOTE — PROGRESS NOTE ADULT - ASSESSMENT
Impression:  68 year old female, with past history significant for HTN, Anemia, Smoking, PVD, Claudication of R-LE, and RLE stent placement, who presents for evaluation of pancreatic mass from [facility].    #pancreatic mass with obstructive jaundice - s/p EUS with biopsy; no stent placement as patient is asymptomatic from elevated TB  # PVD with RLE stent  #UTI on abx      Recs:  - followup path from lung nodule bx to eval for metastatic disease  - followup med onc recs   - trend CBC, CMP, INR  - diet as tolerated  - if patient develops pruritis or rising shaquille, please call GI back as may necessitate ERCP with stent placement Opt out